# Patient Record
Sex: MALE | Race: WHITE | Employment: OTHER | URBAN - METROPOLITAN AREA
[De-identification: names, ages, dates, MRNs, and addresses within clinical notes are randomized per-mention and may not be internally consistent; named-entity substitution may affect disease eponyms.]

---

## 2019-12-02 PROBLEM — K59.00 CONSTIPATION: Status: ACTIVE | Noted: 2019-12-02

## 2019-12-02 PROBLEM — H91.93 BILATERAL HEARING LOSS: Status: ACTIVE | Noted: 2019-12-02

## 2019-12-02 PROBLEM — R01.1 MURMUR: Status: ACTIVE | Noted: 2019-12-02

## 2019-12-02 PROBLEM — N18.30 CKD (CHRONIC KIDNEY DISEASE) STAGE 3, GFR 30-59 ML/MIN (HCC): Status: ACTIVE | Noted: 2019-12-02

## 2019-12-09 ENCOUNTER — HOSPITAL ENCOUNTER (OUTPATIENT)
Dept: CT IMAGING | Age: 79
Discharge: HOME | End: 2019-12-09
Attending: FAMILY MEDICINE
Payer: MEDICARE

## 2019-12-09 DIAGNOSIS — N18.30 CKD (CHRONIC KIDNEY DISEASE) STAGE 3, GFR 30-59 ML/MIN (HCC): ICD-10-CM

## 2019-12-09 DIAGNOSIS — K59.00 CONSTIPATION, UNSPECIFIED CONSTIPATION TYPE: ICD-10-CM

## 2019-12-09 DIAGNOSIS — R10.84 GENERALIZED ABDOMINAL PAIN: ICD-10-CM

## 2019-12-09 PROCEDURE — 74176 CT ABD & PELVIS W/O CONTRAST: CPT

## 2019-12-09 PROCEDURE — 74011636320 HC RX REV CODE- 636/320: Performed by: FAMILY MEDICINE

## 2019-12-09 RX ADMIN — DIATRIZOATE MEGLUMINE AND DIATRIZOATE SODIUM 15 ML: 660; 100 LIQUID ORAL; RECTAL at 09:47

## 2019-12-26 ENCOUNTER — HOSPITAL ENCOUNTER (OUTPATIENT)
Dept: PET IMAGING | Age: 79
Discharge: HOME | End: 2019-12-26
Payer: MEDICARE

## 2019-12-26 DIAGNOSIS — R91.1 LUNG NODULE, SOLITARY: ICD-10-CM

## 2019-12-26 PROCEDURE — A9552 F18 FDG: HCPCS

## 2019-12-26 PROCEDURE — 74011636320 HC RX REV CODE- 636/320: Performed by: FAMILY MEDICINE

## 2019-12-26 RX ORDER — SODIUM CHLORIDE 0.9 % (FLUSH) 0.9 %
10 SYRINGE (ML) INJECTION
Status: COMPLETED | OUTPATIENT
Start: 2019-12-26 | End: 2019-12-26

## 2019-12-26 RX ADMIN — Medication 10 ML: at 12:43

## 2019-12-26 RX ADMIN — DIATRIZOATE MEGLUMINE AND DIATRIZOATE SODIUM 10 ML: 660; 100 LIQUID ORAL; RECTAL at 12:43

## 2020-01-23 PROBLEM — N40.1 BENIGN PROSTATIC HYPERPLASIA WITH NOCTURIA: Status: ACTIVE | Noted: 2020-01-23

## 2020-01-23 PROBLEM — R35.1 BENIGN PROSTATIC HYPERPLASIA WITH NOCTURIA: Status: ACTIVE | Noted: 2020-01-23

## 2020-01-23 PROBLEM — R97.20 ELEVATED PSA: Status: ACTIVE | Noted: 2020-01-23

## 2020-06-06 PROBLEM — I10 ESSENTIAL HYPERTENSION: Status: ACTIVE | Noted: 2020-06-06

## 2020-08-18 ENCOUNTER — HOSPITAL ENCOUNTER (OUTPATIENT)
Dept: LAB | Age: 80
Discharge: HOME | End: 2020-08-18

## 2020-08-18 PROCEDURE — 88312 SPECIAL STAINS GROUP 1: CPT

## 2020-08-18 PROCEDURE — 88305 TISSUE EXAM BY PATHOLOGIST: CPT

## 2021-01-01 ENCOUNTER — PATIENT OUTREACH (OUTPATIENT)
Dept: CASE MANAGEMENT | Age: 81
End: 2021-01-01

## 2021-01-01 ENCOUNTER — APPOINTMENT (OUTPATIENT)
Dept: NON INVASIVE DIAGNOSTICS | Age: 81
End: 2021-01-01
Attending: INTERNAL MEDICINE
Payer: MEDICARE

## 2021-01-01 ENCOUNTER — HOSPITAL ENCOUNTER (INPATIENT)
Age: 81
LOS: 1 days | Discharge: ANOTHER HOSPITAL | DRG: 057 | End: 2021-09-23
Attending: EMERGENCY MEDICINE | Admitting: FAMILY MEDICINE
Payer: MEDICARE

## 2021-01-01 ENCOUNTER — APPOINTMENT (OUTPATIENT)
Dept: MRI IMAGING | Age: 81
DRG: 057 | End: 2021-01-01
Attending: FAMILY MEDICINE
Payer: MEDICARE

## 2021-01-01 ENCOUNTER — APPOINTMENT (OUTPATIENT)
Dept: GENERAL RADIOLOGY | Age: 81
DRG: 057 | End: 2021-01-01
Attending: INTERNAL MEDICINE
Payer: MEDICARE

## 2021-01-01 ENCOUNTER — APPOINTMENT (OUTPATIENT)
Dept: CT IMAGING | Age: 81
End: 2021-01-01
Attending: INTERNAL MEDICINE
Payer: MEDICARE

## 2021-01-01 ENCOUNTER — APPOINTMENT (OUTPATIENT)
Dept: MRI IMAGING | Age: 81
End: 2021-01-01
Attending: PSYCHIATRY & NEUROLOGY
Payer: MEDICARE

## 2021-01-01 ENCOUNTER — APPOINTMENT (OUTPATIENT)
Dept: GENERAL RADIOLOGY | Age: 81
End: 2021-01-01
Attending: EMERGENCY MEDICINE
Payer: MEDICARE

## 2021-01-01 ENCOUNTER — APPOINTMENT (OUTPATIENT)
Dept: CT IMAGING | Age: 81
End: 2021-01-01
Attending: EMERGENCY MEDICINE
Payer: MEDICARE

## 2021-01-01 ENCOUNTER — APPOINTMENT (OUTPATIENT)
Dept: GENERAL RADIOLOGY | Age: 81
End: 2021-01-01
Attending: STUDENT IN AN ORGANIZED HEALTH CARE EDUCATION/TRAINING PROGRAM
Payer: MEDICARE

## 2021-01-01 ENCOUNTER — HOSPITAL ENCOUNTER (OUTPATIENT)
Age: 81
Setting detail: OBSERVATION
Discharge: HOME | End: 2021-08-14
Attending: STUDENT IN AN ORGANIZED HEALTH CARE EDUCATION/TRAINING PROGRAM | Admitting: INTERNAL MEDICINE
Payer: MEDICARE

## 2021-01-01 ENCOUNTER — HOSPITAL ENCOUNTER (EMERGENCY)
Age: 81
Discharge: HOME | End: 2021-08-01
Attending: EMERGENCY MEDICINE
Payer: MEDICARE

## 2021-01-01 ENCOUNTER — APPOINTMENT (OUTPATIENT)
Dept: MRI IMAGING | Age: 81
End: 2021-01-01
Attending: INTERNAL MEDICINE
Payer: MEDICARE

## 2021-01-01 ENCOUNTER — APPOINTMENT (OUTPATIENT)
Dept: GENERAL RADIOLOGY | Age: 81
End: 2021-01-01
Attending: INTERNAL MEDICINE
Payer: MEDICARE

## 2021-01-01 ENCOUNTER — HOSPITAL ENCOUNTER (INPATIENT)
Age: 81
LOS: 5 days | Discharge: NURSING, REHAB OR LONG-TERM CARE | DRG: 057 | End: 2021-09-28
Attending: INTERNAL MEDICINE | Admitting: FAMILY MEDICINE
Payer: MEDICARE

## 2021-01-01 VITALS
DIASTOLIC BLOOD PRESSURE: 82 MMHG | HEIGHT: 72 IN | SYSTOLIC BLOOD PRESSURE: 189 MMHG | HEART RATE: 76 BPM | TEMPERATURE: 98 F | OXYGEN SATURATION: 97 % | RESPIRATION RATE: 18 BRPM | WEIGHT: 198 LBS | BODY MASS INDEX: 26.82 KG/M2

## 2021-01-01 VITALS
TEMPERATURE: 98.2 F | RESPIRATION RATE: 18 BRPM | OXYGEN SATURATION: 97 % | DIASTOLIC BLOOD PRESSURE: 80 MMHG | SYSTOLIC BLOOD PRESSURE: 181 MMHG | HEART RATE: 71 BPM

## 2021-01-01 VITALS
WEIGHT: 209 LBS | BODY MASS INDEX: 29.26 KG/M2 | HEIGHT: 71 IN | RESPIRATION RATE: 16 BRPM | HEART RATE: 68 BPM | OXYGEN SATURATION: 98 % | DIASTOLIC BLOOD PRESSURE: 83 MMHG | TEMPERATURE: 98.2 F | SYSTOLIC BLOOD PRESSURE: 172 MMHG

## 2021-01-01 VITALS
HEART RATE: 70 BPM | TEMPERATURE: 98 F | DIASTOLIC BLOOD PRESSURE: 72 MMHG | OXYGEN SATURATION: 93 % | RESPIRATION RATE: 20 BRPM | SYSTOLIC BLOOD PRESSURE: 122 MMHG

## 2021-01-01 DIAGNOSIS — R26.2 INABILITY TO WALK: ICD-10-CM

## 2021-01-01 DIAGNOSIS — T14.8XXA ABRASION: ICD-10-CM

## 2021-01-01 DIAGNOSIS — I95.1 ORTHOSTATIC HYPOTENSION: ICD-10-CM

## 2021-01-01 DIAGNOSIS — R49.0 HOARSENESS: ICD-10-CM

## 2021-01-01 DIAGNOSIS — R27.0 ATAXIA: ICD-10-CM

## 2021-01-01 DIAGNOSIS — R29.6 MULTIPLE FALLS: ICD-10-CM

## 2021-01-01 DIAGNOSIS — R29.898 RIGHT ARM WEAKNESS: Primary | ICD-10-CM

## 2021-01-01 DIAGNOSIS — S20.211A CONTUSION OF RIB ON RIGHT SIDE, INITIAL ENCOUNTER: Primary | ICD-10-CM

## 2021-01-01 DIAGNOSIS — R47.81 SLURRED SPEECH: ICD-10-CM

## 2021-01-01 DIAGNOSIS — R53.1 WEAKNESS: Primary | ICD-10-CM

## 2021-01-01 LAB
ALBUMIN SERPL-MCNC: 3.6 G/DL (ref 3.2–4.6)
ALBUMIN/GLOB SERPL: 0.8 {RATIO} (ref 1.2–3.5)
ALP SERPL-CCNC: 71 U/L (ref 50–136)
ALT SERPL-CCNC: 33 U/L (ref 12–65)
ANION GAP SERPL CALC-SCNC: 6 MMOL/L (ref 7–16)
ANION GAP SERPL CALC-SCNC: 8 MMOL/L (ref 7–16)
ANION GAP SERPL CALC-SCNC: 8 MMOL/L (ref 7–16)
APPEARANCE UR: CLEAR
AST SERPL-CCNC: 28 U/L (ref 15–37)
ATRIAL RATE: 67 BPM
BASOPHILS # BLD: 0 K/UL (ref 0–0.2)
BASOPHILS # BLD: 0.1 K/UL (ref 0–0.2)
BASOPHILS NFR BLD: 0 % (ref 0–2)
BASOPHILS NFR BLD: 1 % (ref 0–2)
BILIRUB SERPL-MCNC: 0.4 MG/DL (ref 0.2–1.1)
BILIRUB UR QL: NEGATIVE
BUN SERPL-MCNC: 17 MG/DL (ref 8–23)
CALCIUM SERPL-MCNC: 8.9 MG/DL (ref 8.3–10.4)
CALCIUM SERPL-MCNC: 9.3 MG/DL (ref 8.3–10.4)
CALCIUM SERPL-MCNC: 9.4 MG/DL (ref 8.3–10.4)
CALCULATED P AXIS, ECG09: 53 DEGREES
CALCULATED R AXIS, ECG10: 80 DEGREES
CALCULATED T AXIS, ECG11: 65 DEGREES
CHLORIDE SERPL-SCNC: 104 MMOL/L (ref 98–107)
CHLORIDE SERPL-SCNC: 105 MMOL/L (ref 98–107)
CHLORIDE SERPL-SCNC: 107 MMOL/L (ref 98–107)
CHOLEST SERPL-MCNC: 131 MG/DL
CO2 SERPL-SCNC: 26 MMOL/L (ref 21–32)
CO2 SERPL-SCNC: 26 MMOL/L (ref 21–32)
CO2 SERPL-SCNC: 29 MMOL/L (ref 21–32)
COLOR UR: YELLOW
COVID-19 RAPID TEST, COVR: NOT DETECTED
CREAT SERPL-MCNC: 1.1 MG/DL (ref 0.8–1.5)
CREAT SERPL-MCNC: 1.2 MG/DL (ref 0.8–1.5)
CREAT SERPL-MCNC: 1.39 MG/DL (ref 0.8–1.5)
CRP SERPL-MCNC: 0.7 MG/DL (ref 0–0.9)
DIAGNOSIS, 93000: NORMAL
DIFFERENTIAL METHOD BLD: ABNORMAL
DIFFERENTIAL METHOD BLD: ABNORMAL
ECHO AO ROOT DIAM: 3.27 CM
ECHO AV AREA PEAK VELOCITY: 3.08 CM2
ECHO AV AREA PEAK VELOCITY: 3.08 CM2
ECHO AV AREA VTI: 3.09 CM2
ECHO AV AREA VTI: 3.09 CM2
ECHO AV MEAN GRADIENT: 8.44 MMHG
ECHO AV PEAK GRADIENT: 17.08 MMHG
ECHO AV PEAK VELOCITY: 206.64 CM/S
ECHO AV VTI: 41.16 CM
ECHO IVC PROX: 1.42 CM
ECHO LA MAJOR AXIS: 3.76 CM
ECHO LA MINOR AXIS: 1.77 CM
ECHO LV E' LATERAL VELOCITY: 7 CM/S
ECHO LV E' SEPTAL VELOCITY: 8 CM/S
ECHO LV EDV A2C: 48.02 ML
ECHO LV EDV A4C: 101.95 ML
ECHO LV EDV BP: 69.86 ML (ref 67–155)
ECHO LV EDV INDEX A4C: 48.1 ML/M2
ECHO LV EDV INDEX BP: 33 ML/M2
ECHO LV EDV NDEX A2C: 22.7 ML/M2
ECHO LV EJECTION FRACTION A2C: 61 PERCENT
ECHO LV EJECTION FRACTION A4C: 64 PERCENT
ECHO LV EJECTION FRACTION BIPLANE: 61.8 PERCENT (ref 55–100)
ECHO LV ESV A2C: 18.64 ML
ECHO LV ESV A4C: 36.83 ML
ECHO LV ESV BP: 26.67 ML (ref 22–58)
ECHO LV ESV INDEX A2C: 8.8 ML/M2
ECHO LV ESV INDEX A4C: 17.4 ML/M2
ECHO LV ESV INDEX BP: 12.6 ML/M2
ECHO LV INTERNAL DIMENSION DIASTOLIC: 4.93 CM (ref 4.2–5.9)
ECHO LV INTERNAL DIMENSION SYSTOLIC: 3.2 CM
ECHO LV IVSD: 0.99 CM (ref 0.6–1)
ECHO LV MASS 2D: 167.2 G (ref 88–224)
ECHO LV MASS INDEX 2D: 78.8 G/M2 (ref 49–115)
ECHO LV POSTERIOR WALL DIASTOLIC: 0.92 CM (ref 0.6–1)
ECHO LVOT DIAM: 2.39 CM
ECHO LVOT PEAK GRADIENT: 8.1 MMHG
ECHO LVOT PEAK VELOCITY: 142.34 CM/S
ECHO LVOT SV: 127.3 ML
ECHO LVOT VTI: 28.48 CM
ECHO MV A VELOCITY: 79.5 CM/S
ECHO MV AREA PHT: 2.58 CM2
ECHO MV E DECELERATION TIME (DT): 293.63 MS
ECHO MV E VELOCITY: 57.65 CM/S
ECHO MV E/A RATIO: 0.73
ECHO MV E/E' LATERAL: 8.24
ECHO MV E/E' RATIO (AVERAGED): 7.72
ECHO MV E/E' SEPTAL: 7.21
ECHO MV PRESSURE HALF TIME (PHT): 85.15 MS
ECHO RIGHT VENTRICULAR SYSTOLIC PRESSURE (RVSP): 34.8 MMHG
ECHO RV TAPSE: 2.64 CM (ref 1.5–2)
ECHO TV REGURGITANT MAX VELOCITY: 281.81 CM/S
ECHO TV REGURGITANT PEAK GRADIENT: 31.77 MMHG
EOSINOPHIL # BLD: 0 K/UL (ref 0–0.8)
EOSINOPHIL # BLD: 0 K/UL (ref 0–0.8)
EOSINOPHIL NFR BLD: 0 % (ref 0.5–7.8)
EOSINOPHIL NFR BLD: 0 % (ref 0.5–7.8)
ERYTHROCYTE [DISTWIDTH] IN BLOOD BY AUTOMATED COUNT: 11.8 % (ref 11.9–14.6)
ERYTHROCYTE [DISTWIDTH] IN BLOOD BY AUTOMATED COUNT: 11.9 % (ref 11.9–14.6)
ERYTHROCYTE [DISTWIDTH] IN BLOOD BY AUTOMATED COUNT: 12 % (ref 11.9–14.6)
ERYTHROCYTE [SEDIMENTATION RATE] IN BLOOD: 32 MM/HR (ref 0–20)
EST. AVERAGE GLUCOSE BLD GHB EST-MCNC: 114 MG/DL
GLOBULIN SER CALC-MCNC: 4.3 G/DL (ref 2.3–3.5)
GLUCOSE BLD STRIP.AUTO-MCNC: 97 MG/DL (ref 65–100)
GLUCOSE SERPL-MCNC: 101 MG/DL (ref 65–100)
GLUCOSE SERPL-MCNC: 87 MG/DL (ref 65–100)
GLUCOSE SERPL-MCNC: 95 MG/DL (ref 65–100)
GLUCOSE UR STRIP.AUTO-MCNC: NEGATIVE MG/DL
HBA1C MFR BLD: 5.6 % (ref 4.2–6.3)
HCT VFR BLD AUTO: 42 % (ref 41.1–50.3)
HCT VFR BLD AUTO: 43 % (ref 41.1–50.3)
HCT VFR BLD AUTO: 45 % (ref 41.1–50.3)
HDLC SERPL-MCNC: 32 MG/DL (ref 40–60)
HDLC SERPL: 4.1 {RATIO}
HGB BLD-MCNC: 14.2 G/DL (ref 13.6–17.2)
HGB BLD-MCNC: 14.3 G/DL (ref 13.6–17.2)
HGB BLD-MCNC: 15 G/DL (ref 13.6–17.2)
HGB UR QL STRIP: NEGATIVE
IMM GRANULOCYTES # BLD AUTO: 0 K/UL (ref 0–0.5)
IMM GRANULOCYTES # BLD AUTO: 0 K/UL (ref 0–0.5)
IMM GRANULOCYTES NFR BLD AUTO: 0 % (ref 0–5)
IMM GRANULOCYTES NFR BLD AUTO: 0 % (ref 0–5)
INR PPP: 1
KETONES UR QL STRIP.AUTO: NEGATIVE MG/DL
LDLC SERPL CALC-MCNC: 81.4 MG/DL
LEUKOCYTE ESTERASE UR QL STRIP.AUTO: NEGATIVE
LVOT MG: 3.91 MMHG
LYMPHOCYTES # BLD: 1.7 K/UL (ref 0.5–4.6)
LYMPHOCYTES # BLD: 1.7 K/UL (ref 0.5–4.6)
LYMPHOCYTES NFR BLD: 17 % (ref 13–44)
LYMPHOCYTES NFR BLD: 19 % (ref 13–44)
MAGNESIUM SERPL-MCNC: 2.2 MG/DL (ref 1.8–2.4)
MCH RBC QN AUTO: 30.6 PG (ref 26.1–32.9)
MCH RBC QN AUTO: 30.7 PG (ref 26.1–32.9)
MCH RBC QN AUTO: 31.3 PG (ref 26.1–32.9)
MCHC RBC AUTO-ENTMCNC: 33.3 G/DL (ref 31.4–35)
MCHC RBC AUTO-ENTMCNC: 33.3 G/DL (ref 31.4–35)
MCHC RBC AUTO-ENTMCNC: 33.8 G/DL (ref 31.4–35)
MCV RBC AUTO: 90.7 FL (ref 79.6–97.8)
MCV RBC AUTO: 92.1 FL (ref 79.6–97.8)
MCV RBC AUTO: 93.8 FL (ref 79.6–97.8)
MM INDURATION POC: 0 MM (ref 0–5)
MM INDURATION POC: NORMAL (ref 0–5)
MONOCYTES # BLD: 0.8 K/UL (ref 0.1–1.3)
MONOCYTES # BLD: 0.9 K/UL (ref 0.1–1.3)
MONOCYTES NFR BLD: 10 % (ref 4–12)
MONOCYTES NFR BLD: 8 % (ref 4–12)
NEUTS SEG # BLD: 6.1 K/UL (ref 1.7–8.2)
NEUTS SEG # BLD: 7.3 K/UL (ref 1.7–8.2)
NEUTS SEG NFR BLD: 69 % (ref 43–78)
NEUTS SEG NFR BLD: 74 % (ref 43–78)
NITRITE UR QL STRIP.AUTO: NEGATIVE
NRBC # BLD: 0 K/UL (ref 0–0.2)
P-R INTERVAL, ECG05: 174 MS
PH UR STRIP: 5.5 [PH] (ref 5–9)
PLATELET # BLD AUTO: 248 K/UL (ref 150–450)
PLATELET # BLD AUTO: 271 K/UL (ref 150–450)
PLATELET # BLD AUTO: 349 K/UL (ref 150–450)
PMV BLD AUTO: 8.9 FL (ref 9.4–12.3)
PMV BLD AUTO: 9.3 FL (ref 9.4–12.3)
PMV BLD AUTO: 9.6 FL (ref 9.4–12.3)
POTASSIUM SERPL-SCNC: 3.6 MMOL/L (ref 3.5–5.1)
POTASSIUM SERPL-SCNC: 4.1 MMOL/L (ref 3.5–5.1)
POTASSIUM SERPL-SCNC: 4.2 MMOL/L (ref 3.5–5.1)
PPD POC: NEGATIVE NEGATIVE
PROT SERPL-MCNC: 7.9 G/DL (ref 6.3–8.2)
PROT UR STRIP-MCNC: NEGATIVE MG/DL
PROTHROMBIN TIME: 13.5 SEC (ref 12.6–14.5)
Q-T INTERVAL, ECG07: 388 MS
QRS DURATION, ECG06: 74 MS
QTC CALCULATION (BEZET), ECG08: 409 MS
RBC # BLD AUTO: 4.63 M/UL (ref 4.23–5.6)
RBC # BLD AUTO: 4.67 M/UL (ref 4.23–5.6)
RBC # BLD AUTO: 4.8 M/UL (ref 4.23–5.6)
SARS-COV-2, COV2: NORMAL
SARS-COV-2, COV2: NOT DETECTED
SERVICE CMNT-IMP: NORMAL
SODIUM SERPL-SCNC: 138 MMOL/L (ref 136–145)
SODIUM SERPL-SCNC: 140 MMOL/L (ref 136–145)
SODIUM SERPL-SCNC: 141 MMOL/L (ref 138–145)
SOURCE, COVRS: NORMAL
SP GR UR REFRACTOMETRY: 1.01 (ref 1–1.02)
SPECIMEN SOURCE, FCOV2M: NORMAL
TRIGL SERPL-MCNC: 88 MG/DL (ref 35–150)
TROPONIN-HIGH SENSITIVITY: 12 PG/ML (ref 0–14)
TSH SERPL DL<=0.005 MIU/L-ACNC: 1.8 UIU/ML (ref 0.36–3.74)
UROBILINOGEN UR QL STRIP.AUTO: 1 EU/DL (ref 0.2–1)
VENTRICULAR RATE, ECG03: 67 BPM
VIT B12 SERPL-MCNC: 622 PG/ML (ref 193–986)
VLDLC SERPL CALC-MCNC: 17.6 MG/DL (ref 6–23)
WBC # BLD AUTO: 8.8 K/UL (ref 4.3–11.1)
WBC # BLD AUTO: 8.9 K/UL (ref 4.3–11.1)
WBC # BLD AUTO: 9.9 K/UL (ref 4.3–11.1)

## 2021-01-01 PROCEDURE — 74011250637 HC RX REV CODE- 250/637: Performed by: INTERNAL MEDICINE

## 2021-01-01 PROCEDURE — 97116 GAIT TRAINING THERAPY: CPT

## 2021-01-01 PROCEDURE — 99218 HC RM OBSERVATION: CPT

## 2021-01-01 PROCEDURE — 74011250637 HC RX REV CODE- 250/637: Performed by: FAMILY MEDICINE

## 2021-01-01 PROCEDURE — 93005 ELECTROCARDIOGRAM TRACING: CPT | Performed by: STUDENT IN AN ORGANIZED HEALTH CARE EDUCATION/TRAINING PROGRAM

## 2021-01-01 PROCEDURE — 92611 MOTION FLUOROSCOPY/SWALLOW: CPT

## 2021-01-01 PROCEDURE — 80048 BASIC METABOLIC PNL TOTAL CA: CPT

## 2021-01-01 PROCEDURE — 2709999900 HC NON-CHARGEABLE SUPPLY

## 2021-01-01 PROCEDURE — 77030040361 HC SLV COMPR DVT MDII -B

## 2021-01-01 PROCEDURE — 65270000029 HC RM PRIVATE

## 2021-01-01 PROCEDURE — 71100 X-RAY EXAM RIBS UNI 2 VIEWS: CPT

## 2021-01-01 PROCEDURE — 72100 X-RAY EXAM L-S SPINE 2/3 VWS: CPT

## 2021-01-01 PROCEDURE — 83735 ASSAY OF MAGNESIUM: CPT

## 2021-01-01 PROCEDURE — 74011000250 HC RX REV CODE- 250: Performed by: INTERNAL MEDICINE

## 2021-01-01 PROCEDURE — 97110 THERAPEUTIC EXERCISES: CPT

## 2021-01-01 PROCEDURE — 97530 THERAPEUTIC ACTIVITIES: CPT

## 2021-01-01 PROCEDURE — 90471 IMMUNIZATION ADMIN: CPT

## 2021-01-01 PROCEDURE — 36415 COLL VENOUS BLD VENIPUNCTURE: CPT

## 2021-01-01 PROCEDURE — 93005 ELECTROCARDIOGRAM TRACING: CPT | Performed by: EMERGENCY MEDICINE

## 2021-01-01 PROCEDURE — 85027 COMPLETE CBC AUTOMATED: CPT

## 2021-01-01 PROCEDURE — 97166 OT EVAL MOD COMPLEX 45 MIN: CPT

## 2021-01-01 PROCEDURE — 84443 ASSAY THYROID STIM HORMONE: CPT

## 2021-01-01 PROCEDURE — 80053 COMPREHEN METABOLIC PANEL: CPT

## 2021-01-01 PROCEDURE — 74011250636 HC RX REV CODE- 250/636: Performed by: PHYSICIAN ASSISTANT

## 2021-01-01 PROCEDURE — 82607 VITAMIN B-12: CPT

## 2021-01-01 PROCEDURE — 72148 MRI LUMBAR SPINE W/O DYE: CPT

## 2021-01-01 PROCEDURE — 65390000012 HC CONDITION CODE 44 OBSERVATION

## 2021-01-01 PROCEDURE — 99232 SBSQ HOSP IP/OBS MODERATE 35: CPT | Performed by: NURSE PRACTITIONER

## 2021-01-01 PROCEDURE — 74011000636 HC RX REV CODE- 636: Performed by: INTERNAL MEDICINE

## 2021-01-01 PROCEDURE — 74011000258 HC RX REV CODE- 258: Performed by: INTERNAL MEDICINE

## 2021-01-01 PROCEDURE — 84484 ASSAY OF TROPONIN QUANT: CPT

## 2021-01-01 PROCEDURE — 70450 CT HEAD/BRAIN W/O DYE: CPT

## 2021-01-01 PROCEDURE — 72146 MRI CHEST SPINE W/O DYE: CPT

## 2021-01-01 PROCEDURE — 74011250636 HC RX REV CODE- 250/636: Performed by: INTERNAL MEDICINE

## 2021-01-01 PROCEDURE — 92526 ORAL FUNCTION THERAPY: CPT

## 2021-01-01 PROCEDURE — 85025 COMPLETE CBC W/AUTO DIFF WBC: CPT

## 2021-01-01 PROCEDURE — 97165 OT EVAL LOW COMPLEX 30 MIN: CPT

## 2021-01-01 PROCEDURE — 70498 CT ANGIOGRAPHY NECK: CPT

## 2021-01-01 PROCEDURE — 81003 URINALYSIS AUTO W/O SCOPE: CPT

## 2021-01-01 PROCEDURE — 83036 HEMOGLOBIN GLYCOSYLATED A1C: CPT

## 2021-01-01 PROCEDURE — 99285 EMERGENCY DEPT VISIT HI MDM: CPT

## 2021-01-01 PROCEDURE — 99231 SBSQ HOSP IP/OBS SF/LOW 25: CPT | Performed by: PSYCHIATRY & NEUROLOGY

## 2021-01-01 PROCEDURE — 90715 TDAP VACCINE 7 YRS/> IM: CPT | Performed by: PHYSICIAN ASSISTANT

## 2021-01-01 PROCEDURE — 86140 C-REACTIVE PROTEIN: CPT

## 2021-01-01 PROCEDURE — 74011250637 HC RX REV CODE- 250/637: Performed by: PHYSICIAN ASSISTANT

## 2021-01-01 PROCEDURE — 72141 MRI NECK SPINE W/O DYE: CPT

## 2021-01-01 PROCEDURE — 74011250636 HC RX REV CODE- 250/636: Performed by: EMERGENCY MEDICINE

## 2021-01-01 PROCEDURE — 97535 SELF CARE MNGMENT TRAINING: CPT

## 2021-01-01 PROCEDURE — 74230 X-RAY XM SWLNG FUNCJ C+: CPT

## 2021-01-01 PROCEDURE — 86580 TB INTRADERMAL TEST: CPT | Performed by: INTERNAL MEDICINE

## 2021-01-01 PROCEDURE — 99284 EMERGENCY DEPT VISIT MOD MDM: CPT

## 2021-01-01 PROCEDURE — 97162 PT EVAL MOD COMPLEX 30 MIN: CPT

## 2021-01-01 PROCEDURE — 97161 PT EVAL LOW COMPLEX 20 MIN: CPT

## 2021-01-01 PROCEDURE — 87635 SARS-COV-2 COVID-19 AMP PRB: CPT

## 2021-01-01 PROCEDURE — 71046 X-RAY EXAM CHEST 2 VIEWS: CPT

## 2021-01-01 PROCEDURE — 82962 GLUCOSE BLOOD TEST: CPT

## 2021-01-01 PROCEDURE — 93306 TTE W/DOPPLER COMPLETE: CPT

## 2021-01-01 PROCEDURE — 70551 MRI BRAIN STEM W/O DYE: CPT

## 2021-01-01 PROCEDURE — 85610 PROTHROMBIN TIME: CPT

## 2021-01-01 PROCEDURE — U0005 INFEC AGEN DETEC AMPLI PROBE: HCPCS

## 2021-01-01 PROCEDURE — 74011000302 HC RX REV CODE- 302: Performed by: INTERNAL MEDICINE

## 2021-01-01 PROCEDURE — 73130 X-RAY EXAM OF HAND: CPT

## 2021-01-01 PROCEDURE — 96372 THER/PROPH/DIAG INJ SC/IM: CPT

## 2021-01-01 PROCEDURE — 97112 NEUROMUSCULAR REEDUCATION: CPT

## 2021-01-01 PROCEDURE — 80061 LIPID PANEL: CPT

## 2021-01-01 PROCEDURE — 99222 1ST HOSP IP/OBS MODERATE 55: CPT | Performed by: PSYCHIATRY & NEUROLOGY

## 2021-01-01 PROCEDURE — 92610 EVALUATE SWALLOWING FUNCTION: CPT

## 2021-01-01 PROCEDURE — 85652 RBC SED RATE AUTOMATED: CPT

## 2021-01-01 PROCEDURE — APPSS60 APP SPLIT SHARED TIME 46-60 MINUTES: Performed by: NURSE PRACTITIONER

## 2021-01-01 PROCEDURE — 65660000000 HC RM CCU STEPDOWN

## 2021-01-01 PROCEDURE — 99283 EMERGENCY DEPT VISIT LOW MDM: CPT

## 2021-01-01 RX ORDER — LABETALOL HYDROCHLORIDE 5 MG/ML
5 INJECTION, SOLUTION INTRAVENOUS
Status: CANCELLED | OUTPATIENT
Start: 2021-01-01

## 2021-01-01 RX ORDER — ACETAMINOPHEN 325 MG/1
650 TABLET ORAL
Status: DISCONTINUED | OUTPATIENT
Start: 2021-01-01 | End: 2021-01-01 | Stop reason: HOSPADM

## 2021-01-01 RX ORDER — ACETAMINOPHEN 650 MG/1
650 SUPPOSITORY RECTAL
Status: DISCONTINUED | OUTPATIENT
Start: 2021-01-01 | End: 2021-01-01 | Stop reason: HOSPADM

## 2021-01-01 RX ORDER — GUAIFENESIN 100 MG/5ML
81 LIQUID (ML) ORAL DAILY
Status: CANCELLED | OUTPATIENT
Start: 2021-01-01

## 2021-01-01 RX ORDER — POLYETHYLENE GLYCOL 3350 17 G/17G
17 POWDER, FOR SOLUTION ORAL DAILY PRN
Status: DISCONTINUED | OUTPATIENT
Start: 2021-01-01 | End: 2021-01-01 | Stop reason: HOSPADM

## 2021-01-01 RX ORDER — SODIUM CHLORIDE 0.9 % (FLUSH) 0.9 %
5-10 SYRINGE (ML) INJECTION EVERY 8 HOURS
Status: DISCONTINUED | OUTPATIENT
Start: 2021-01-01 | End: 2021-01-01 | Stop reason: HOSPADM

## 2021-01-01 RX ORDER — SODIUM CHLORIDE 0.9 % (FLUSH) 0.9 %
5-10 SYRINGE (ML) INJECTION AS NEEDED
Status: DISCONTINUED | OUTPATIENT
Start: 2021-01-01 | End: 2021-01-01 | Stop reason: HOSPADM

## 2021-01-01 RX ORDER — OXYCODONE HYDROCHLORIDE 5 MG/1
5 TABLET ORAL
Status: DISCONTINUED | OUTPATIENT
Start: 2021-01-01 | End: 2021-01-01 | Stop reason: HOSPADM

## 2021-01-01 RX ORDER — ACETAMINOPHEN 650 MG/1
650 SUPPOSITORY RECTAL
Status: CANCELLED | OUTPATIENT
Start: 2021-01-01

## 2021-01-01 RX ORDER — TAMSULOSIN HYDROCHLORIDE 0.4 MG/1
0.4 CAPSULE ORAL DAILY
Status: DISCONTINUED | OUTPATIENT
Start: 2021-01-01 | End: 2021-01-01 | Stop reason: HOSPADM

## 2021-01-01 RX ORDER — ONDANSETRON 4 MG/1
4 TABLET, ORALLY DISINTEGRATING ORAL
Status: DISCONTINUED | OUTPATIENT
Start: 2021-01-01 | End: 2021-01-01 | Stop reason: HOSPADM

## 2021-01-01 RX ORDER — SODIUM CHLORIDE 0.9 % (FLUSH) 0.9 %
10 SYRINGE (ML) INJECTION
Status: ACTIVE | OUTPATIENT
Start: 2021-01-01 | End: 2021-01-01

## 2021-01-01 RX ORDER — ONDANSETRON 2 MG/ML
4 INJECTION INTRAMUSCULAR; INTRAVENOUS
Status: DISCONTINUED | OUTPATIENT
Start: 2021-01-01 | End: 2021-01-01 | Stop reason: HOSPADM

## 2021-01-01 RX ORDER — ACETAMINOPHEN 325 MG/1
650 TABLET ORAL
Status: CANCELLED | OUTPATIENT
Start: 2021-01-01

## 2021-01-01 RX ORDER — FACIAL-BODY WIPES
10 EACH TOPICAL DAILY PRN
Status: DISCONTINUED | OUTPATIENT
Start: 2021-01-01 | End: 2021-01-01 | Stop reason: HOSPADM

## 2021-01-01 RX ORDER — SODIUM CHLORIDE 0.9 % (FLUSH) 0.9 %
5-40 SYRINGE (ML) INJECTION EVERY 8 HOURS
Status: DISCONTINUED | OUTPATIENT
Start: 2021-01-01 | End: 2021-01-01 | Stop reason: HOSPADM

## 2021-01-01 RX ORDER — CETIRIZINE HCL 10 MG
10 TABLET ORAL DAILY
Qty: 1 TABLET | Refills: 0 | Status: SHIPPED
Start: 2021-01-01

## 2021-01-01 RX ORDER — SODIUM CHLORIDE 0.9 % (FLUSH) 0.9 %
5-40 SYRINGE (ML) INJECTION AS NEEDED
Status: DISCONTINUED | OUTPATIENT
Start: 2021-01-01 | End: 2021-01-01 | Stop reason: HOSPADM

## 2021-01-01 RX ORDER — SODIUM CHLORIDE 9 MG/ML
75 INJECTION, SOLUTION INTRAVENOUS CONTINUOUS
Status: DISCONTINUED | OUTPATIENT
Start: 2021-01-01 | End: 2021-01-01

## 2021-01-01 RX ORDER — GUAIFENESIN 100 MG/5ML
81 LIQUID (ML) ORAL DAILY
Status: DISCONTINUED | OUTPATIENT
Start: 2021-01-01 | End: 2021-01-01 | Stop reason: HOSPADM

## 2021-01-01 RX ORDER — ONDANSETRON 4 MG/1
4 TABLET, ORALLY DISINTEGRATING ORAL
Status: CANCELLED | OUTPATIENT
Start: 2021-01-01

## 2021-01-01 RX ORDER — HYDRALAZINE HYDROCHLORIDE 50 MG/1
25 TABLET, FILM COATED ORAL
Status: DISCONTINUED | OUTPATIENT
Start: 2021-01-01 | End: 2021-01-01 | Stop reason: HOSPADM

## 2021-01-01 RX ORDER — ENOXAPARIN SODIUM 100 MG/ML
40 INJECTION SUBCUTANEOUS EVERY 24 HOURS
Status: DISCONTINUED | OUTPATIENT
Start: 2021-01-01 | End: 2021-01-01 | Stop reason: HOSPADM

## 2021-01-01 RX ORDER — FAMOTIDINE 20 MG/1
20 TABLET, FILM COATED ORAL
Status: DISCONTINUED | OUTPATIENT
Start: 2021-01-01 | End: 2021-01-01 | Stop reason: HOSPADM

## 2021-01-01 RX ORDER — ONDANSETRON 2 MG/ML
4 INJECTION INTRAMUSCULAR; INTRAVENOUS
Status: CANCELLED | OUTPATIENT
Start: 2021-01-01

## 2021-01-01 RX ORDER — POLYETHYLENE GLYCOL 3350 17 G/17G
17 POWDER, FOR SOLUTION ORAL DAILY PRN
Status: CANCELLED | OUTPATIENT
Start: 2021-01-01

## 2021-01-01 RX ORDER — ATORVASTATIN CALCIUM 40 MG/1
40 TABLET, FILM COATED ORAL
Status: DISCONTINUED | OUTPATIENT
Start: 2021-01-01 | End: 2021-01-01

## 2021-01-01 RX ORDER — CETIRIZINE HCL 10 MG
10 TABLET ORAL DAILY
Status: DISCONTINUED | OUTPATIENT
Start: 2021-01-01 | End: 2021-01-01 | Stop reason: HOSPADM

## 2021-01-01 RX ORDER — GUAIFENESIN 100 MG/5ML
81 LIQUID (ML) ORAL DAILY
Qty: 30 TABLET | Refills: 2 | Status: SHIPPED | OUTPATIENT
Start: 2021-01-01

## 2021-01-01 RX ORDER — SODIUM CHLORIDE 0.9 % (FLUSH) 0.9 %
10 SYRINGE (ML) INJECTION
Status: COMPLETED | OUTPATIENT
Start: 2021-01-01 | End: 2021-01-01

## 2021-01-01 RX ORDER — FINASTERIDE 5 MG/1
5 TABLET, FILM COATED ORAL DAILY
Status: DISCONTINUED | OUTPATIENT
Start: 2021-01-01 | End: 2021-01-01 | Stop reason: HOSPADM

## 2021-01-01 RX ORDER — ACETAMINOPHEN 325 MG/1
650 TABLET ORAL
Status: COMPLETED | OUTPATIENT
Start: 2021-01-01 | End: 2021-01-01

## 2021-01-01 RX ADMIN — ATORVASTATIN CALCIUM 40 MG: 40 TABLET, FILM COATED ORAL at 21:43

## 2021-01-01 RX ADMIN — Medication 10 ML: at 13:09

## 2021-01-01 RX ADMIN — Medication 10 ML: at 21:13

## 2021-01-01 RX ADMIN — Medication 10 ML: at 14:14

## 2021-01-01 RX ADMIN — Medication 5 ML: at 04:41

## 2021-01-01 RX ADMIN — ATORVASTATIN CALCIUM 40 MG: 40 TABLET, FILM COATED ORAL at 21:13

## 2021-01-01 RX ADMIN — ASPIRIN 81 MG: 81 TABLET, CHEWABLE ORAL at 08:46

## 2021-01-01 RX ADMIN — BARIUM SULFATE 45 ML: 400 SUSPENSION ORAL at 10:43

## 2021-01-01 RX ADMIN — Medication 5 ML: at 21:44

## 2021-01-01 RX ADMIN — SODIUM CHLORIDE 100 ML: 900 INJECTION, SOLUTION INTRAVENOUS at 08:06

## 2021-01-01 RX ADMIN — FINASTERIDE 5 MG: 5 TABLET, FILM COATED ORAL at 08:44

## 2021-01-01 RX ADMIN — ASPIRIN 81 MG: 81 TABLET, CHEWABLE ORAL at 08:52

## 2021-01-01 RX ADMIN — Medication 5 ML: at 04:24

## 2021-01-01 RX ADMIN — ACETAMINOPHEN 650 MG: 325 TABLET ORAL at 14:20

## 2021-01-01 RX ADMIN — IOPAMIDOL 50 ML: 755 INJECTION, SOLUTION INTRAVENOUS at 08:05

## 2021-01-01 RX ADMIN — SODIUM CHLORIDE 75 ML/HR: 900 INJECTION, SOLUTION INTRAVENOUS at 21:46

## 2021-01-01 RX ADMIN — POLYETHYLENE GLYCOL 3350 17 G: 17 POWDER, FOR SOLUTION ORAL at 09:06

## 2021-01-01 RX ADMIN — CETIRIZINE HYDROCHLORIDE 10 MG: 10 TABLET ORAL at 18:15

## 2021-01-01 RX ADMIN — TAMSULOSIN HYDROCHLORIDE 0.4 MG: 0.4 CAPSULE ORAL at 08:44

## 2021-01-01 RX ADMIN — HYDRALAZINE HYDROCHLORIDE 25 MG: 25 TABLET ORAL at 01:23

## 2021-01-01 RX ADMIN — ASPIRIN 81 MG: 81 TABLET, CHEWABLE ORAL at 08:44

## 2021-01-01 RX ADMIN — Medication 5 ML: at 22:48

## 2021-01-01 RX ADMIN — OXYCODONE 5 MG: 5 TABLET ORAL at 08:45

## 2021-01-01 RX ADMIN — ASPIRIN 81 MG: 81 TABLET, CHEWABLE ORAL at 09:19

## 2021-01-01 RX ADMIN — BARIUM SULFATE 45 ML: 980 POWDER, FOR SUSPENSION ORAL at 10:43

## 2021-01-01 RX ADMIN — CETIRIZINE HYDROCHLORIDE 10 MG: 10 TABLET ORAL at 08:50

## 2021-01-01 RX ADMIN — ASPIRIN 81 MG: 81 TABLET, CHEWABLE ORAL at 08:50

## 2021-01-01 RX ADMIN — BARIUM SULFATE 45 ML: 980 POWDER, FOR SUSPENSION ORAL at 13:11

## 2021-01-01 RX ADMIN — SODIUM CHLORIDE 1000 ML: 900 INJECTION, SOLUTION INTRAVENOUS at 15:35

## 2021-01-01 RX ADMIN — TETANUS TOXOID, REDUCED DIPHTHERIA TOXOID AND ACELLULAR PERTUSSIS VACCINE, ADSORBED 0.5 ML: 5; 2.5; 8; 8; 2.5 SUSPENSION INTRAMUSCULAR at 09:30

## 2021-01-01 RX ADMIN — ACETAMINOPHEN 650 MG: 325 TABLET, FILM COATED ORAL at 18:30

## 2021-01-01 RX ADMIN — ENOXAPARIN SODIUM 40 MG: 40 INJECTION SUBCUTANEOUS at 21:43

## 2021-01-01 RX ADMIN — OXYCODONE 5 MG: 5 TABLET ORAL at 15:27

## 2021-01-01 RX ADMIN — HYDRALAZINE HYDROCHLORIDE 25 MG: 25 TABLET ORAL at 01:06

## 2021-01-01 RX ADMIN — Medication 5 ML: at 08:46

## 2021-01-01 RX ADMIN — BARIUM SULFATE 15 ML: 400 PASTE ORAL at 13:10

## 2021-01-01 RX ADMIN — HYDRALAZINE HYDROCHLORIDE 25 MG: 25 TABLET ORAL at 08:45

## 2021-01-01 RX ADMIN — BARIUM SULFATE 15 ML: 400 SUSPENSION ORAL at 13:15

## 2021-01-01 RX ADMIN — Medication 5 ML: at 04:44

## 2021-01-01 RX ADMIN — HYDRALAZINE HYDROCHLORIDE 25 MG: 25 TABLET ORAL at 06:42

## 2021-01-01 RX ADMIN — HYDRALAZINE HYDROCHLORIDE 25 MG: 25 TABLET ORAL at 19:32

## 2021-01-01 RX ADMIN — BARIUM SULFATE 15 ML: 400 SUSPENSION ORAL at 13:18

## 2021-01-01 RX ADMIN — Medication 10 ML: at 14:15

## 2021-01-01 RX ADMIN — ASPIRIN 81 MG: 81 TABLET, CHEWABLE ORAL at 08:56

## 2021-01-01 RX ADMIN — TAMSULOSIN HYDROCHLORIDE 0.4 MG: 0.4 CAPSULE ORAL at 08:39

## 2021-01-01 RX ADMIN — BARIUM SULFATE 15 ML: 400 PASTE ORAL at 10:43

## 2021-01-01 RX ADMIN — CETIRIZINE HYDROCHLORIDE 10 MG: 10 TABLET ORAL at 08:46

## 2021-01-01 RX ADMIN — HYDRALAZINE HYDROCHLORIDE 25 MG: 25 TABLET ORAL at 05:11

## 2021-01-01 RX ADMIN — CETIRIZINE HYDROCHLORIDE 10 MG: 10 TABLET ORAL at 08:52

## 2021-01-01 RX ADMIN — FINASTERIDE 5 MG: 5 TABLET, FILM COATED ORAL at 08:39

## 2021-01-01 RX ADMIN — TUBERCULIN PURIFIED PROTEIN DERIVATIVE 5 UNITS: 5 INJECTION, SOLUTION INTRADERMAL at 21:43

## 2021-01-01 RX ADMIN — ACETAMINOPHEN 650 MG: 325 TABLET ORAL at 09:24

## 2021-01-01 RX ADMIN — ASPIRIN 81 MG: 81 TABLET, CHEWABLE ORAL at 08:39

## 2021-01-01 RX ADMIN — ASPIRIN 81 MG: 81 TABLET, CHEWABLE ORAL at 08:38

## 2021-01-01 RX ADMIN — Medication 5 ML: at 21:49

## 2021-01-01 RX ADMIN — Medication 10 ML: at 08:06

## 2021-08-01 NOTE — ED TRIAGE NOTES
Pt to triage via w/c. Pt states was walking in the house and bedroom slipper caused him to trip and fall on the stairs leading into the house. Denies dizziness prior to fall, hitting head or LOC. Pt has right rib pain and right elbow. 2 skin tears. Pt also has right lower back pain as well.  Denies anticoags

## 2021-08-01 NOTE — ED PROVIDER NOTES
Patient to ER complaining of right rib pain status post fall this morning. Apparently he was in his slippers on his front porch slipped on the step and fell onto his right rib area he was ambulatory after the fall. He is not on any blood thinners or aspirin. Denies any head injury, no chest pain or shortness of breath no meds taken prior to arrival    The history is provided by the patient. Fall  The accident occurred 1 to 2 hours ago. The fall occurred while walking. He fell from a height of ground level. He landed on concrete. The volume of blood lost was minimal. Point of impact: rt Side. Pain location: Right ribs. The pain is at a severity of 10/10. He was ambulatory at the scene. There was no entrapment after the fall. There was no drug use involved in the accident. There was no alcohol use involved in the accident. Pertinent negatives include no loss of consciousness. The risk factors include being elderly. Associated symptoms comments: Abrasion  to right elbow. The symptoms are aggravated by pressure on injury. He has tried nothing for the symptoms. The treatment provided no relief. It is unknown when the patient last had a tetanus shot.         Past Medical History:   Diagnosis Date    Chronic kidney disease     Elevated PSA     Hearing difficulty     Hepatitis A 2020    Hypercholesterolemia     Kidney stone        Past Surgical History:   Procedure Laterality Date    HX COLONOSCOPY  05/18/2016         Family History:   Problem Relation Age of Onset    No Known Problems Mother     Cancer Father     No Known Problems Maternal Grandmother     No Known Problems Maternal Grandfather     No Known Problems Paternal Grandmother     No Known Problems Paternal Grandfather        Social History     Socioeconomic History    Marital status:      Spouse name: Not on file    Number of children: Not on file    Years of education: Not on file    Highest education level: Not on file Occupational History    Not on file   Tobacco Use    Smoking status: Never Smoker    Smokeless tobacco: Never Used   Vaping Use    Vaping Use: Never used   Substance and Sexual Activity    Alcohol use: No     Alcohol/week: 0.0 standard drinks    Drug use: Never    Sexual activity: Never   Other Topics Concern    Not on file   Social History Narrative    Not on file     Social Determinants of Health     Financial Resource Strain:     Difficulty of Paying Living Expenses:    Food Insecurity:     Worried About Running Out of Food in the Last Year:     [address] in the Last Year:    Transportation Needs:     Lack of Transportation (Medical):  Lack of Transportation (Non-Medical):    Physical Activity:     Days of Exercise per Week:     Minutes of Exercise per Session:    Stress:     Feeling of Stress :    Social Connections:     Frequency of Communication with Friends and Family:     Frequency of Social Gatherings with Friends and Family:     Attends Mandaeism Services:     Active Member of Clubs or Organizations:     Attends Club or Organization Meetings:     Marital Status:    Intimate Partner Violence:     Fear of Current or Ex-Partner:     Emotionally Abused:     Physically Abused:     Sexually Abused: ALLERGIES: Augmentin [amoxicillin-pot clavulanate]    Review of Systems   Neurological: Negative for loss of consciousness. All other systems reviewed and are negative. Vitals:    08/01/21 0822   BP: (!) 181/87   Pulse: 63   Resp: 16   Temp: 98.2 °F (36.8 °C)   SpO2: 96%   Weight: 94.8 kg (209 lb)   Height: 5' 11\" (1.803 m)            Physical Exam  Vitals and nursing note reviewed. Constitutional:       General: He is not in acute distress. Appearance: Normal appearance. He is well-developed and normal weight. He is not diaphoretic. HENT:      Head: Normocephalic and atraumatic.       Right Ear: External ear normal.      Left Ear: External ear normal. Nose: Nose normal.      Mouth/Throat:      Mouth: Mucous membranes are moist.      Pharynx: Oropharynx is clear. Eyes:      Pupils: Pupils are equal, round, and reactive to light. Neck:      Comments: Full cervical range of motion no tenderness to palpation  Cardiovascular:      Rate and Rhythm: Normal rate and regular rhythm. Pulmonary:      Effort: Pulmonary effort is normal.      Breath sounds: Normal breath sounds. Comments: No pain over anterior chest area some right lower rib tenderness noted lungs are clear no crepitus  Abdominal:      General: Abdomen is flat. Bowel sounds are normal.      Palpations: Abdomen is soft. Tenderness: There is no abdominal tenderness. Hernia: No hernia is present. Musculoskeletal:         General: Tenderness present. Normal range of motion. Cervical back: Normal range of motion and neck supple. No tenderness. Comments: Pain to palpation right lower lateral rib area no abrasions no point tenderness, no pain to palpation over thoracic or obvious lumbar spine area no pain to posterior hip area. Full range of motion of bilateral hips and knees, right upper forearm/elbow area with approximately 2 cm superficial abrasion no active bleeding. No bony tenderness to motion of the elbow. No shoulder or wrist pain noted   Skin:     General: Skin is warm. Neurological:      General: No focal deficit present. Mental Status: He is alert and oriented to person, place, and time. Mental status is at baseline. Psychiatric:         Mood and Affect: Mood normal.         Behavior: Behavior normal.          MDM  Number of Diagnoses or Management Options  Diagnosis management comments: XR RIBS RT UNI 2 V   Final Result    1. No acute abnormality. XR SPINE LUMB 2 OR 3 V   Final Result        1. No acute abnormality        2. Multilevel degenerative changes        3.  Atherosclerosis      Abrasion to right elbow cleaned and dressed tetanus was updated patient given Tylenol for pain.   Patient use ice to all sore areas alternate Tylenol Motrin for pain see primary care doctor for recheck next week return to ER symptoms worsen       Amount and/or Complexity of Data Reviewed  Tests in the radiology section of CPT®: ordered and reviewed  Review and summarize past medical records: yes    Risk of Complications, Morbidity, and/or Mortality  Presenting problems: moderate  Diagnostic procedures: moderate  Management options: low    Patient Progress  Patient progress: improved         Procedures

## 2021-08-01 NOTE — DISCHARGE INSTRUCTIONS
Ice to all sore areas, alternate Tylenol Motrin for pain, keep abrasion clean, see your primary care physician for routine recheck or return if any chest pain shortness of breath or fever

## 2021-08-01 NOTE — ED NOTES
I have reviewed discharge instructions with the patient and spouse. The patient and spouse verbalized understanding. Patient left ED via Discharge Method: ambulatory to Home with spouse. Opportunity for questions and clarification provided. Patient given 0 scripts. No e-sign. To continue your aftercare when you leave the hospital, you may receive an automated call from our care team to check in on how you are doing. This is a free service and part of our promise to provide the best care and service to meet your aftercare needs.  If you have questions, or wish to unsubscribe from this service please call [phone]. Thank you for Choosing our [facility] Emergency Department.

## 2021-08-11 PROBLEM — I16.1 HYPERTENSIVE EMERGENCY: Status: ACTIVE | Noted: 2021-01-01

## 2021-08-11 PROBLEM — I63.9 ACUTE ISCHEMIC STROKE (HCC): Status: ACTIVE | Noted: 2021-01-01

## 2021-08-11 NOTE — ED NOTES
Pt had a fall on 8/1. Since then pt had 2 more fa1l r/t weakness. Wife reports since the first fall pt has been unable to use his right arm for basic ADLs. Wife reports pt has increased in trouble understanding tasks since before the first fall. Wife denies  hitting head w/ falls.  Denies blood thinner

## 2021-08-11 NOTE — ED PROVIDER NOTES
49-year-old male patient presents with his wife from outpatient primary care provider's office with concern for stroke. Patient has suffered at least 3 separate falls over the past several weeks. Most recently this occurred 2 nights ago. Patient becomes weak and loses balance falling to the floor. Wife states that since patient's initial fall approximately 2 weeks ago, he has lost the ability to utilize his right side normally. She states he normally feeds himself without difficulty but is unable to do so at this time secondary to weakness. She notes intermittent bouts of slurred speech has been present for several weeks. She states patient is very unsteady on his feet which is contributed to his falls. He reports history of bruised ribs from his falls, denies head strike or loss of consciousness. Patient reports no other pain at this time. Wife denies recent fevers chills or changes in bowel or bladder habits.            Past Medical History:   Diagnosis Date    Chronic kidney disease     Elevated PSA     Hearing difficulty     Hepatitis A 2020    Hypercholesterolemia     Kidney stone        Past Surgical History:   Procedure Laterality Date    HX COLONOSCOPY  05/18/2016         Family History:   Problem Relation Age of Onset    No Known Problems Mother     Cancer Father     No Known Problems Maternal Grandmother     No Known Problems Maternal Grandfather     No Known Problems Paternal Grandmother     No Known Problems Paternal Grandfather        Social History     Socioeconomic History    Marital status:      Spouse name: Not on file    Number of children: Not on file    Years of education: Not on file    Highest education level: Not on file   Occupational History    Not on file   Tobacco Use    Smoking status: Never Smoker    Smokeless tobacco: Never Used   Vaping Use    Vaping Use: Never used   Substance and Sexual Activity    Alcohol use: No     Alcohol/week: 0.0 standard drinks    Drug use: Never    Sexual activity: Never   Other Topics Concern    Not on file   Social History Narrative    Not on file     Social Determinants of Health     Financial Resource Strain:     Difficulty of Paying Living Expenses:    Food Insecurity:     Worried About Running Out of Food in the Last Year:     [address] in the Last Year:    Transportation Needs:     Lack of Transportation (Medical):  Lack of Transportation (Non-Medical):    Physical Activity:     Days of Exercise per Week:     Minutes of Exercise per Session:    Stress:     Feeling of Stress :    Social Connections:     Frequency of Communication with Friends and Family:     Frequency of Social Gatherings with Friends and Family:     Attends Sikh Services:     Active Member of Clubs or Organizations:     Attends Club or Organization Meetings:     Marital Status:    Intimate Partner Violence:     Fear of Current or Ex-Partner:     Emotionally Abused:     Physically Abused:     Sexually Abused: ALLERGIES: Augmentin [amoxicillin-pot clavulanate]    Review of Systems   Constitutional: Negative for chills, diaphoresis and fever. HENT: Negative for congestion, sneezing and sore throat. Eyes: Negative for visual disturbance. Respiratory: Negative for cough, chest tightness, shortness of breath and wheezing. Cardiovascular: Negative for chest pain and leg swelling. Gastrointestinal: Negative for abdominal pain, blood in stool, diarrhea, nausea and vomiting. Endocrine: Negative for polyuria. Genitourinary: Negative for difficulty urinating, dysuria, flank pain, hematuria and urgency. Musculoskeletal: Negative for back pain, myalgias, neck pain and neck stiffness. Skin: Negative for color change and rash. Neurological: Positive for dizziness, speech difficulty and weakness. Negative for syncope, light-headedness, numbness and headaches.    Psychiatric/Behavioral: Negative for behavioral problems. All other systems reviewed and are negative. Vitals:    08/11/21 1429   BP: (!) 150/78   Pulse: 73   Resp: 17   Temp: 98.2 °F (36.8 °C)   SpO2: 95%   Weight: 89.8 kg (198 lb)   Height: 6' (1.829 m)            Physical Exam  Vitals and nursing note reviewed. Constitutional:       General: He is not in acute distress. Appearance: He is well-developed. He is not diaphoretic. Comments: Elderly male patient, alert and oriented to person place and time. No acute distress, speaks in clear, fluid sentences. HENT:      Head: Normocephalic and atraumatic. Comments: No obvious signs of trauma about the head or face, no hemotympanum or stewart sign. Right Ear: External ear normal.      Left Ear: External ear normal.      Nose: Nose normal.   Eyes:      Pupils: Pupils are equal, round, and reactive to light. Cardiovascular:      Rate and Rhythm: Normal rate and regular rhythm. Heart sounds: Normal heart sounds. No murmur heard. No friction rub. No gallop. Pulmonary:      Effort: Pulmonary effort is normal. No respiratory distress. Breath sounds: Normal breath sounds. No stridor. No decreased breath sounds, wheezing, rhonchi or rales. Chest:      Chest wall: No tenderness. Abdominal:      General: There is no distension. Palpations: Abdomen is soft. There is no mass. Tenderness: There is no abdominal tenderness. There is no guarding or rebound. Hernia: No hernia is present. Musculoskeletal:         General: No tenderness or deformity. Normal range of motion. Cervical back: Normal range of motion. Skin:     General: Skin is warm and dry. Neurological:      Mental Status: He is alert and oriented to person, place, and time. GCS: GCS eye subscore is 4. GCS verbal subscore is 5. GCS motor subscore is 6. Cranial Nerves: No cranial nerve deficit. Sensory: Sensation is intact. Motor: Pronator drift present. No weakness. Coordination: Finger-Nose-Finger Test abnormal.      Comments: There is appreciable weakness in the right upper extremity with comparison to the left, slight pronator drift and ataxia is noted in this side as well. No focal findings over the lower extremities. Times, patient's speech does appear to slurred though this is intermittent in nature. He answers questions appropriately but is very hard of hearing. MDM  Number of Diagnoses or Management Options  Diagnosis management comments: Based on patient's exam I do have concern for underlying neurologic cause of his weakness. CT imaging today shows no acute abnormality. Patient certainly on the window for TPA or neuro intervention given his prolonged course of symptoms. Awaiting remainder of labs, chest x-ray and urinalysis.            Amount and/or Complexity of Data Reviewed  Clinical lab tests: ordered and reviewed  Tests in the radiology section of CPT®: ordered and reviewed  Tests in the medicine section of CPT®: ordered and reviewed  Independent visualization of images, tracings, or specimens: yes    Risk of Complications, Morbidity, and/or Mortality  Presenting problems: moderate  Diagnostic procedures: low  Management options: moderate    Patient Progress  Patient progress: stable         Procedures

## 2021-08-11 NOTE — H&P
[facility]   History and Physical       Name:  Brain Martinez  Age: 78 y.o. Sex: male   :  1940    MRN:  [id number]   PCP:  Madina Li MD      Admit Date:  2021  3:17 PM   Presenting Complaint: Extremity Weakness    Reason for Admission:  Acute ischemic stroke [facility]) [I63.9]    Assessment & Plan:     Right Arm weakness, Ataxia concerning for CVA  Frequent falls due to unsteady gait  Symptom onset to 3 weeks ago. CT on arrival without any acute intracranial changes. Not a tPA candidate due to duration of symptoms.  - ASA 81 and lipitor 80mg  - Neuro check q4h  - Telemetry monitoring  - MRI Brain (STAT), Echo w/ bubble study, CTA head/neck   - bedside swallow test / SLP   - f/u A1c, TSH, trops  - PT/OT  - DVT PPx  - glucemic control    Hypertensive emergency  Blood pressure elevated to 225/123. Per wife, patient appears agitated as she is in the hospital.  -Labetalol IV as needed and hydralazine p.o. as needed. -May need schedule antihypertensive medication. CKD Stage3  Cr 1.39 with (eGFR 52) with baseline 1.6-1.4  -Hydration with NS IVF given patient will get CTA with contrast.    BPH: On proscar and flomax. Diet: DIET NPO    VTE ppx: lovenox  GI ppx: pepcid  CODE STATUS: FULL      History of Presenting Illness: Brain Martinez is a 78 y.o. male with medical history of BPH, CKD stage III who presented to ED his PCPs office due to multiple falls within 2 to 3 weeks. Per wife at bed side patient had fallen about 3 times since 2 weeks ago with last fall yesterday evening. Reports she has noticed that he has been getting weaker on his right hand and unable to feed himself he normally would. He also have intermittent slurred speech with word finding difficulty. Had loss of consciousness or head trauma during his falls. Reports that his gait seemed to be unsteady. He is hard of hearing but appears to be alert and oriented x3.   Reports that he is in normal state of health without any fever, chills, shortness of breath, chest pain, abdominal pain, nausea, vomiting except for recent multiple falls. In the ED, patient is noted to be hypertensive to 225/123, saturating well on room air. Laboratory work up is unremarkable. T head without acute intracranial abnormalities. Chest x-ray with no acute cardiopulmonary disease but showed right upper lobe nodule which appeared to be present on prior PET scan. EKG showed sinus rhythm. Review of Systems: 10 systems reviewed and negative except as noted in HPI. Past Medical History:   Diagnosis Date    Chronic kidney disease     Elevated PSA     Hearing difficulty     Hepatitis A 2020    Hypercholesterolemia     Kidney stone        Past Surgical History:   Procedure Laterality Date    HX COLONOSCOPY  05/18/2016       Family History : reviewed.    Family History   Problem Relation Age of Onset    No Known Problems Mother     Cancer Father     No Known Problems Maternal Grandmother     No Known Problems Maternal Grandfather     No Known Problems Paternal Grandmother     No Known Problems Paternal Grandfather         Social History     Tobacco Use    Smoking status: Never Smoker    Smokeless tobacco: Never Used   Substance Use Topics    Alcohol use: No     Alcohol/week: 0.0 standard drinks       Allergies   Allergen Reactions    Augmentin [Amoxicillin-Pot Clavulanate] Nausea and Vomiting       Immunization History   Administered Date(s) Administered    COVID-19, PFIZER, MRNA, LNP-S, PF, 30MCG/0.3ML DOSE 01/23/2021, 02/23/2021    Hep A Vaccine (Adult) 11/03/2020    Influenza High Dose Vaccine PF 01/22/2018, 10/09/2019    Influenza Vaccine (Tri) Adjuvanted (>65 Yrs FLUAD TRI 44743) 10/09/2019    Influenza, Quadrivalent, Adjuvanted (>65 Yrs FLUAD QUAD [id number]) 09/16/2020    Pneumococcal Conjugate (PCV-13) 01/22/2018    Tdap 08/01/2021         PTA Medications:  Current Outpatient Medications   Medication Instructions    finasteride (PROSCAR) 5 mg, Oral, DAILY, TAKE ONE TABLET BY MOUTH EVERY DAY    pantoprazole (PROTONIX) 40 mg, Oral, DAILY    tamsulosin (FLOMAX) 0.4 mg, Oral, DAILY    tiZANidine (ZANAFLEX) 2 mg, Oral, BEDTIME PRN       Objective:     Patient Vitals for the past 24 hrs:   Temp Pulse Resp BP SpO2   08/11/21 1909  66 21 (!) 191/89 96 %   08/11/21 1833  69  (!) 225/123 96 %   08/11/21 1803  69  (!) 207/93 94 %   08/11/21 1749  64 21 (!) 218/88 96 %   08/11/21 1429 98.2 °F (36.8 °C) 73 17 (!) 150/78 95 %       Oxygen Therapy  O2 Sat (%): 96 % (08/11/21 1909)  Pulse via Oximetry: 66 beats per minute (08/11/21 1909)  O2 Device: None (Room air) (08/11/21 1429)    Body mass index is 26.85 kg/m². Physical Exam:     General:     alert, awake, no acute distress. HENT:   normocephalic, atraumatic. Hard of hearing  Eyes:   anicteric sclerae, moist conjunctiva, EOMI  Neck:    supple, non-tender. Trachea midline. Lungs:   CTAB, no wheezing, rhonchi, rales  Cardiac:   RRR, Normal S1 and S2. No S3, S4 or murmurs. Abdomen:   Soft, non distended, nontender, +BS, no guarding/rebound  Extremities:   No edema , pedal pulses present  Skin:   Warn, dry, normnal turgor and texture  Neuro:  AAOx3. No gross focal neurological deficit,+R pronator drift, strength 4/5 on the right, no facial droop or slurred speech. Abnormal finger to nose test on the right. Psychiatric:  No anxiety, calm, cooperative    Data Reviewed: I have reviewed all labs, meds, and studies.     Recent Results (from the past 24 hour(s))   EKG, 12 LEAD, INITIAL    Collection Time: 08/11/21  2:29 PM   Result Value Ref Range    Ventricular Rate 67 BPM    Atrial Rate 67 BPM    P-R Interval 174 ms    QRS Duration 74 ms    Q-T Interval 388 ms    QTC Calculation (Bezet) 409 ms    Calculated P Axis 53 degrees    Calculated R Axis 80 degrees    Calculated T Axis 65 degrees    Diagnosis       Normal sinus rhythm  Low voltage QRS  Cannot rule out Septal infarct , age undetermined  Abnormal ECG  No previous ECGs available  Confirmed by [facility] ([id number]) on 8/11/2021 5:09:49 PM     GLUCOSE, POC    Collection Time: 08/11/21  2:36 PM   Result Value Ref Range    Glucose (POC) 97 65 - 100 mg/dL    Performed by Jose Alejandro    CBC WITH AUTOMATED DIFF    Collection Time: 08/11/21  2:39 PM   Result Value Ref Range    WBC 8.9 4.3 - 11.1 K/uL    RBC 4.80 4.23 - 5.6 M/uL    HGB 15.0 13.6 - 17.2 g/dL    HCT 45.0 41.1 - 50.3 %    MCV 93.8 79.6 - 97.8 FL    MCH 31.3 26.1 - 32.9 PG    MCHC 33.3 31.4 - 35.0 g/dL    RDW 12.0 11.9 - 14.6 %    PLATELET 860 975 - 927 K/uL    MPV 8.9 (L) 9.4 - 12.3 FL    ABSOLUTE NRBC 0.00 0.0 - 0.2 K/uL    DF AUTOMATED      NEUTROPHILS 69 43 - 78 %    LYMPHOCYTES 19 13 - 44 %    MONOCYTES 10 4.0 - 12.0 %    EOSINOPHILS 0 (L) 0.5 - 7.8 %    BASOPHILS 1 0.0 - 2.0 %    IMMATURE GRANULOCYTES 0 0.0 - 5.0 %    ABS. NEUTROPHILS 6.1 1.7 - 8.2 K/UL    ABS. LYMPHOCYTES 1.7 0.5 - 4.6 K/UL    ABS. MONOCYTES 0.9 0.1 - 1.3 K/UL    ABS. EOSINOPHILS 0.0 0.0 - 0.8 K/UL    ABS. BASOPHILS 0.1 0.0 - 0.2 K/UL    ABS. IMM.  GRANS. 0.0 0.0 - 0.5 K/UL   METABOLIC PANEL, BASIC    Collection Time: 08/11/21  2:39 PM   Result Value Ref Range    Sodium 138 136 - 145 mmol/L    Potassium 4.2 3.5 - 5.1 mmol/L    Chloride 104 98 - 107 mmol/L    CO2 26 21 - 32 mmol/L    Anion gap 8 7 - 16 mmol/L    Glucose 95 65 - 100 mg/dL    BUN 17 8 - 23 MG/DL    Creatinine 1.39 0.8 - 1.5 MG/DL    GFR est AA >60 >60 ml/min/1.73m2    GFR est non-AA 52 (L) >60 ml/min/1.73m2    Calcium 9.3 8.3 - 10.4 MG/DL   PROTHROMBIN TIME + INR    Collection Time: 08/11/21  2:39 PM   Result Value Ref Range    Prothrombin time 13.5 12.6 - 14.5 sec    INR 1.0     TROPONIN-HIGH SENSITIVITY    Collection Time: 08/11/21  2:39 PM   Result Value Ref Range    Troponin-High Sensitivity 12.0 0 - 14 pg/mL   URINALYSIS W/ RFLX MICROSCOPIC    Collection Time: 08/11/21  5:52 PM   Result Value Ref Range    Color YELLOW Appearance CLEAR      Specific gravity 1.015 1.001 - 1.023      pH (UA) 5.5 5.0 - 9.0      Protein Negative NEG mg/dL    Glucose Negative mg/dL    Ketone Negative NEG mg/dL    Bilirubin Negative NEG      Blood Negative NEG      Urobilinogen 1.0 0.2 - 1.0 EU/dL    Nitrites Negative NEG      Leukocyte Esterase Negative NEG         All Micro Results     None          Other Studies:  XR CHEST PA LAT    Result Date: 8/11/2021  EXAMINATION: CHEST RADIOGRAPH 8/11/2021 4:57 PM ACCESSION NUMBER: [id number] INDICATION: chest pain COMPARISON: X-rays 8/1/2021, PET CT 12/26/2019 TECHNIQUE: PA  and lateral views of the chest were obtained. FINDINGS: Cardiac Silhouette: Within normal limits in size. Lungs: No focal airspace disease. There is likely a correlate on the lateral view from the right upper lobe nodule demonstrated on the prior PET CT. Pleura: No pleural effusion. No pneumothorax. Osseous Structures: Thoracic spine spondylosis. Upper Abdomen: Unremarkable. 1. No evidence of acute cardiopulmonary disease. 2. Likely round correlate on the lateral view for the right upper lobe nodule demonstrated on the prior PET CT. Please see the prior PET CT for management recommendations. VOICE DICTATED BY: Dr. Fatimah Green CONT    Result Date: 8/11/2021  EXAMINATION: HEAD CT WITHOUT CONTRAST 8/11/2021 3:57 PM ACCESSION NUMBER: [id number] INDICATION: weakness, falls COMPARISON: None available TECHNIQUE: Multiple-row detector helical CT examination of the head without intravenous contrast. Radiation dose reduction techniques were used for this study:  Our CT scanners use one or all of the following: Automated exposure control, adjustment of the mA and/or kVp according to patient's size, iterative reconstruction. FINDINGS: The ventricles are within normal limits for the degree of global brain parenchymal volume loss. There is no midline shift. The basilar cisterns are patent.  There is no cerebellar tonsillar ectopia or herniation. Hypodensities in the periventricular and subcortical white matter are nonspecific, but they are most likely to represent chronic microangiopathy. There is no evidence of acute intracranial hemorrhage or extra-axial collections. There is no CT evidence of acute infarction. No evidence of skull fracture. The paranasal sinuses and mastoid air cells are well aerated and clear. No acute intracranial abnormality. VOICE DICTATED BY: Dr. Sundar Cordova         Medications:      Problem List:     Hospital Problems as of 8/11/2021 Date Reviewed: 6/30/2021        Codes Class Noted - Resolved POA    Acute ischemic stroke [facility]) ICD-10-CM: I63.9  ICD-9-CM: 434.91  8/11/2021 - Present Yes        Essential hypertension ICD-10-CM: I10  ICD-9-CM: 401.9  6/6/2020 - Present Yes        Benign prostatic hyperplasia with nocturia ICD-10-CM: N40.1, R35.1  ICD-9-CM: 600.01, 788.43  1/23/2020 - Present Yes        CKD (chronic kidney disease) stage 3, GFR 30-59 ml/min ([facility]) ICD-10-CM: N18.30  ICD-9-CM: 585.3  12/2/2019 - Present Yes        Bilateral hearing loss ICD-10-CM: H91.93  ICD-9-CM: 389.9  12/2/2019 - Present Yes                 Part of this note was written by using a voice dictation software and the note has been proof read but may still contain some grammatical/other typographical errors.        Amanda Thomas MD  [facility] Hospitalist Service  August 11, 2021    6:10 PM

## 2021-08-12 PROBLEM — R29.898 RIGHT ARM WEAKNESS: Status: ACTIVE | Noted: 2021-01-01

## 2021-08-12 PROBLEM — R27.0 ATAXIA: Status: ACTIVE | Noted: 2021-01-01

## 2021-08-12 PROBLEM — R29.6 MULTIPLE FALLS: Status: ACTIVE | Noted: 2021-01-01

## 2021-08-12 NOTE — PROGRESS NOTES
08/12/21 0800   NIH Stroke Scale   Interval Handoff/Transfer   Level of Conciousness (1a) 0   LOC Questions (1b) (!) 1   LOC Commands (1c) 0   Best Gaze (2) 0   Visual (3) 0   Facial Palsy (4) 0   Motor Arm, Left (5a) 0   Motor Arm, Right (5b) 0   Motor Leg, Left (6a) 0   Motor Leg, Right (6b) 0   Limb Ataxia (7) 0   Sensory (8) 0   Best Language (9) 0   Dysarthria (10) 0   Extinction and Inattention (11) 0   Total 1   Dual NIH with Carlos Parr RN

## 2021-08-12 NOTE — PROGRESS NOTES
TRANSFER - IN REPORT:    Verbal report received from [facility], RN on [address]  being received from ED for routine progression of care      Report consisted of patients Situation, Background, Assessment and   Recommendations(SBAR). Information from the following report(s) SBAR was reviewed with the receiving nurse. Opportunity for questions and clarification was provided. Assessment completed upon patients arrival to unit and care assumed.

## 2021-08-12 NOTE — PROGRESS NOTES
Physical Therapy Note:    Physical therapy evaluation orders received and chart reviewed. Attempted to see patient this AM to initiate assessment. Patient off floor at X-Ray for MBS. Will follow and re-attempt at a later time/date as schedule permits/patient available.     Thank you,  [place]ena Li, PT, DPT

## 2021-08-12 NOTE — PROGRESS NOTES
[facility]ist Progress Note    Patient: Serena Fierro MRN: [id number]  SSN: [id number]    YOB: 1940  Age: 78 y.o. Sex: male      Admit Date: 8/11/2021    LOS: 1 day     Subjective:     Chief Complaint: Right arm weakness, ataxia, and multiple falls x 3 weeks  Reason for Admission: Hypertensive emergency    78 y.o. male with medical history of BPH, CKD stage III who presented to ED his PCPs office due to multiple falls within 2 to 3 weeks. Per wife at bed side patient had fallen about 3 times since 2 weeks ago with last fall yesterday evening. Reports she has noticed that he has been getting weaker on his right hand and unable to feed himself he normally would. He also have intermittent slurred speech with word finding difficulty. Had loss of consciousness or head trauma during his falls. Reports that his gait seemed to be unsteady. He is hard of hearing but appears to be alert and oriented x3. Reports that he is in normal state of health without any fever, chills, shortness of breath, chest pain, abdominal pain, nausea, vomiting except for recent multiple falls. In the ED, patient is noted to be hypertensive to 225/123, saturating well on room air. Laboratory work up is unremarkable. T head without acute intracranial abnormalities. Chest x-ray with no acute cardiopulmonary disease but showed right upper lobe nodule which appeared to be present on prior PET scan. 8/12 - He feels better today. Not as agitated today about being in the hospital. Still with right arm weakness and not able to walk straight. Review of systems negative except stated above.     Assessment:     Primary Diagnosis: Hypertensive emergency    Hospital Problems as of 8/12/2021 Date Reviewed: 6/30/2021        Codes Class Noted - Resolved POA    * (Principal) Hypertensive emergency ICD-10-CM: I16.1  ICD-9-CM: 401.9  8/11/2021 - Present Yes        Ataxia ICD-10-CM: R27.0  ICD-9-CM: 781.3  8/12/2021 - Present Yes        Right arm weakness ICD-10-CM: R29.898  ICD-9-CM: 729.89  8/11/2021 - Present Yes        Multiple falls ICD-10-CM: R29.6  ICD-9-CM: V15.88  8/12/2021 - Present Yes        Essential hypertension ICD-10-CM: I10  ICD-9-CM: 401.9  6/6/2020 - Present Yes        CKD (chronic kidney disease) stage 3, GFR 30-59 ml/min ([facility]) ICD-10-CM: N18.30  ICD-9-CM: 585.3  12/2/2019 - Present Yes        Benign prostatic hyperplasia with nocturia ICD-10-CM: N40.1, R35.1  ICD-9-CM: 600.01, 788.43  1/23/2020 - Present Yes        Bilateral hearing loss ICD-10-CM: H91.93  ICD-9-CM: 389.9  12/2/2019 - Present Yes              Active Medical Issues and Plan (MDM):     Principal Problem:    Hypertensive emergency (8/11/2021)  - Per patient and wife, this was due to patient being extremely agitated at being admitted  - SBP now 126 with no meds  - Stroke work up unremarkable  - Continue to monitor    Active Problems:    Right arm weakness (8/11/2021)  - Unsure etiology  - Has objective weakness  - ? Cervical  - Stroke work up negative  - Consult Neurology for assistance      Ataxia (8/12/2021)  - Unsure etiology  - Nose-to-finger normal  - No cerebellar issues on MRI  - PT/OT recommended SNF  - Consult Neurology for assistance      Multiple falls (8/12/2021)  - PT/OT      CKD (chronic kidney disease) stage 3, GFR 30-59 ml/min ([facility]) (12/2/2019)  - Stable      Essential hypertension (6/6/2020)  - BP now normal  - No meds given      Otherwise all chronic medical issues appear stable with no changes. Diet: ADULT DIET Easy to Chew; Mildly Thick (Nectar);  No Drinking Straws  VTE ppx: Lovenox    Objective:     Visit Vitals  /71 (BP 1 Location: Left upper arm, BP Patient Position: Semi fowlers)   Pulse 78   Temp 97.9 °F (36.6 °C)   Resp 17   Ht 6' (1.829 m)   Wt 89.8 kg (198 lb)   SpO2 96%   BMI 26.85 kg/m²      Oxygen Therapy  O2 Sat (%): 96 % (08/12/21 1622)  Pulse via Oximetry: 66 beats per minute (08/11/21 1909)  O2 Device: None (Room air) (08/11/21 2031)      Intake and Output:     Intake/Output Summary (Last 24 hours) at 8/12/2021 1630  Last data filed at 8/12/2021 1005  Gross per 24 hour   Intake    Output 450 ml   Net -450 ml         Physical Exam:   GENERAL: alert, cooperative, no distress, appears stated age  EYE: conjunctivae/corneas clear. PERRL. THROAT & NECK: normal and no erythema or exudates noted. LUNG: clear to auscultation bilaterally  HEART: regular rate and rhythm, S1S2, no murmur, no JVD  ABDOMEN: soft, non-tender, non-distended. Bowel sounds normal.   EXTREMITIES:  No edema, 2+ pedal/radial pulses bilaterally  SKIN: no rash or abnormalities  NEUROLOGIC: A&Ox3. Cranial nerves 2-12 grossly intact. RUE 4/5 strength.     Lab/Data Review:  Recent Results (from the past 24 hour(s))   URINALYSIS W/ RFLX MICROSCOPIC    Collection Time: 08/11/21  5:52 PM   Result Value Ref Range    Color YELLOW      Appearance CLEAR      Specific gravity 1.015 1.001 - 1.023      pH (UA) 5.5 5.0 - 9.0      Protein Negative NEG mg/dL    Glucose Negative mg/dL    Ketone Negative NEG mg/dL    Bilirubin Negative NEG      Blood Negative NEG      Urobilinogen 1.0 0.2 - 1.0 EU/dL    Nitrites Negative NEG      Leukocyte Esterase Negative NEG     CBC W/O DIFF    Collection Time: 08/12/21  5:31 AM   Result Value Ref Range    WBC 8.8 4.3 - 11.1 K/uL    RBC 4.63 4.23 - 5.6 M/uL    HGB 14.2 13.6 - 17.2 g/dL    HCT 42.0 41.1 - 50.3 %    MCV 90.7 79.6 - 97.8 FL    MCH 30.7 26.1 - 32.9 PG    MCHC 33.8 31.4 - 35.0 g/dL    RDW 11.9 11.9 - 14.6 %    PLATELET 247 159 - 324 K/uL    MPV 9.3 (L) 9.4 - 12.3 FL    ABSOLUTE NRBC 0.00 0.0 - 0.2 K/uL   LIPID PANEL    Collection Time: 08/12/21  5:31 AM   Result Value Ref Range    Cholesterol, total 131 <200 MG/DL    Triglyceride 88 35 - 150 MG/DL    HDL Cholesterol 32 (L) 40 - 60 MG/DL    LDL, calculated 81.4 <100 MG/DL    VLDL, calculated 17.6 6.0 - 23.0 MG/DL    CHOL/HDL Ratio 4.1     HEMOGLOBIN A1C WITH EAG Collection Time: 08/12/21  5:31 AM   Result Value Ref Range    Hemoglobin A1c 5.6 4.2 - 6.3 %    Est. average glucose 403 mg/dL   METABOLIC PANEL, BASIC    Collection Time: 08/12/21  5:31 AM   Result Value Ref Range    Sodium 141 138 - 145 mmol/L    Potassium 3.6 3.5 - 5.1 mmol/L    Chloride 107 98 - 107 mmol/L    CO2 26 21 - 32 mmol/L    Anion gap 8 7 - 16 mmol/L    Glucose 87 65 - 100 mg/dL    BUN 17 8 - 23 MG/DL    Creatinine 1.10 0.8 - 1.5 MG/DL    GFR est AA >60 >60 ml/min/1.73m2    GFR est non-AA >60 >60 ml/min/1.73m2    Calcium 8.9 8.3 - 10.4 MG/DL   ECHO ADULT COMPLETE    Collection Time: 08/12/21 11:00 AM   Result Value Ref Range    IVSd 0.99 0.60 - 1.00 cm    LVIDd 4.93 4.20 - 5.90 cm    LVIDs 3.20 cm    LVOT d 2.39 cm    LVPWd 0.92 0.60 - 1.00 cm    BP EF 61.8 55.0 - 100.0 percent    LV Ejection Fraction MOD 2C 61 percent    LV Ejection Fraction MOD 4C 64 percent    LV ED Vol A2C 48.02 mL    LV ED Vol A4C 101.95 mL    LV ED Vol BP 69.86 67.0 - 155.0 mL    LV ES Vol A2C 18.64 mL    LV ES Vol A4C 36.83 mL    LV ES Vol BP 26.67 22.0 - 58.0 mL    LVOT Peak Gradient 8.10 mmHg    Left Ventricular Outflow Tract Mean Gradient 3.91 mmHg    LVOT .3 mL    LVOT Peak Velocity 142.34 cm/s    LVOT VTI 28.48 cm    Left Atrium Major Axis 3.76 cm    Aortic Valve Area by Continuity of Peak Velocity 3.08 cm2    Aortic Valve Area by Continuity of Peak Velocity 3.08 cm2    Aortic Valve Area by Continuity of VTI 3.09 cm2    Aortic Valve Area by Continuity of VTI 3.09 cm2    AoV PG 17.08 mmHg    Aortic Valve Systolic Mean Gradient 7.56 mmHg    Aortic Valve Systolic Peak Velocity 887.13 cm/s    AoV VTI 41.16 cm    MV A Joe 79.50 cm/s    Mitral Valve E Wave Deceleration Time 293.63 ms    MV E Joe 57.65 cm/s    Mitral Valve Pressure Half-time 85.15 ms    MVA (PHT) 2.58 cm2    Tapse 2.64 (A) 1.50 - 2.00 cm    Triscuspid Valve Regurgitation Peak Gradient 31.77 mmHg    TR Max Velocity 281.81 cm/s    Ao Root D 3.27 cm    IVC proximal 1.42 cm    LV E' Septal Velocity 8.00 cm/s    LV E' Lateral Velocity 7.00 cm/s    MV E/A 0.73     LV Mass .2 88.0 - 224.0 g    LV Mass AL Index 78.8 49.0 - 115.0 g/m2    E/E' lateral 8.24     E/E' septal 7.21     RVSP 34.8 mmHg    E/E' ratio (averaged) 7.72     LVES Vol Index BP 12.6 mL/m2    LVED Vol Index BP 33.0 mL/m2    Left Atrium Minor Axis 1.77 cm    LVED Vol Index A4C 48.1 mL/m2    LVED Vol Index A2C 22.7 mL/m2    LVES Vol Index A4C 17.4 mL/m2    LVES Vol Index A2C 8.8 mL/m2       SARS-CoV-2 Lab Results  \"Novel Coronavirus\" Test: No results found for: COV2NT   \"Emergent Disease\" Test: No results found for: EDPR  \"SARS-COV-2\" Test: No results found for: XGCOVT  \"[facility]\" Test: No results found for: RSLT  Rapid Test: No results found for: COVR        Imaging:  XR CHEST PA LAT    Result Date: 8/11/2021  EXAMINATION: CHEST RADIOGRAPH 8/11/2021 4:57 PM ACCESSION NUMBER: [id number] INDICATION: chest pain COMPARISON: X-rays 8/1/2021, PET CT 12/26/2019 TECHNIQUE: PA  and lateral views of the chest were obtained. FINDINGS: Cardiac Silhouette: Within normal limits in size. Lungs: No focal airspace disease. There is likely a correlate on the lateral view from the right upper lobe nodule demonstrated on the prior PET CT. Pleura: No pleural effusion. No pneumothorax. Osseous Structures: Thoracic spine spondylosis. Upper Abdomen: Unremarkable. 1. No evidence of acute cardiopulmonary disease. 2. Likely round correlate on the lateral view for the right upper lobe nodule demonstrated on the prior PET CT. Please see the prior PET CT for management recommendations. VOICE DICTATED BY: Dr. Blair Hart    XR RIBS RT UNI 2 V    Result Date: 8/1/2021  EXAMINATION: XR RIBS RT UNI 2 V  8/1/2021 9:06 AM COMPARISON: None available INDICATION: Fall with right rib pain TECHNIQUE: 3 views of the right ribs were obtained FINDINGS: No fractures or other acute bony abnormalities are seen.   The lungs are free from infiltrate or nodule. The surrounding soft tissues are preserved. 1.  No acute abnormality. XR SPINE LUMB 2 OR 3 V    Result Date: 8/1/2021  EXAMINATION: XR SPINE LUMB 2 OR 3 V 8/1/2021 9:06 AM ACCESSION NUMBER: [id number] COMPARISON: None available INDICATION: Fall with right lower back pain TECHNIQUE: 3 views of the lumbar spine were obtained. FINDINGS: Vertebral body alignment: There is mild S-shaped curvature of the lumbar spine. There is no vertebral anomaly. Vertebral body heights: Preserved Degenerative changes: There are mild multilevel degenerative changes with disc space narrowing, sclerosis, and small anterior osteophytes. Post surgical changes: None Included abdominal soft tissues: Calcifications are seen in the aorta and it's major branches. 1. No acute abnormality 2. Multilevel degenerative changes 3. Atherosclerosis     XR SWALLOW FUNC VIDEO    Result Date: 8/12/2021  Modified Barium Swallow INDICATION: Dysphagia, CVA Fluoro time: 2.31 minutes Spot films:  0 Fluoroscopy was used to evaluate pharyngeal function while the patient was given barium solutions and barium coated solids. FINDINGS: Silent aspiration during swallow with thin barium by cup. Shallow penetration with mildly thick barium by straw. No penetration or aspiration with remaining administered consistencies. Aspiration and penetration, as above. Please see concurrent speech and language pathology report for a complete description of findings. MRI BRAIN WO CONT    Result Date: 8/12/2021  Exam: MRI BRAIN WO CONT on 8/12/2021 10:22 AM Clinical History: The Male patient is 78years old presenting for poss cva, with weakness-No trauma-No surgery-No cancer-No prior. Comparison:  Head CT 8/11/2021 Technique:  Axial T2, axial FLAIR, axial diffusion-weighted,  sagittal T1 and coronal gradient-echo scans were performed. Findings: Age-related senescent changes are seen with sulcal and ventricular prominence.  There is mild chronic confluent periventricular white matter disease with scattered lacunae throughout the corona radiata and centrum semiovale. No evidence of restricted diffusion seen to suggest acute ischemia. There are no abnormal extra-axial fluid collections. No evidence of mass or mass effect is seen. There is no diffusion signal abnormality. Expected flow voids are maintained in the major intracranial vessels. Cerebellar involutional changes are seen. Visualized brainstem structures are unremarkable. There is no evidence of Chiari malformation. The ventricular system and CSF containing spaces are proportionate to the degree of cerebral involution. . Visualized extracranial soft tissues are unremarkable. The paranasal sinuses are well pneumatized and aerated. 1.  Age-related senescent changes and chronic microvascular disease without acute intracranial abnormality. CPT code(s) [id number]     CT HEAD WO CONT    Result Date: 8/11/2021  EXAMINATION: HEAD CT WITHOUT CONTRAST 8/11/2021 3:57 PM ACCESSION NUMBER: [id number] INDICATION: weakness, falls COMPARISON: None available TECHNIQUE: Multiple-row detector helical CT examination of the head without intravenous contrast. Radiation dose reduction techniques were used for this study:  Our CT scanners use one or all of the following: Automated exposure control, adjustment of the mA and/or kVp according to patient's size, iterative reconstruction. FINDINGS: The ventricles are within normal limits for the degree of global brain parenchymal volume loss. There is no midline shift. The basilar cisterns are patent. There is no cerebellar tonsillar ectopia or herniation. Hypodensities in the periventricular and subcortical white matter are nonspecific, but they are most likely to represent chronic microangiopathy. There is no evidence of acute intracranial hemorrhage or extra-axial collections. There is no CT evidence of acute infarction. No evidence of skull fracture.   The paranasal sinuses and mastoid air cells are well aerated and clear. No acute intracranial abnormality. VOICE DICTATED BY: Dr. Ansley Wen CONT    Result Date: 8/12/2021  Title:  CT arteriogram of the neck and head. Indication: Right arm weakness. Ataxia. Stroke like symptoms. . Technique: Axial images of the neck and head were obtained after the uneventful administration of intravenous iodinated contrast media. Contrast was used to best identify the arterial structures. Images were reviewed on a separate, free standing, three-dimensional workstation as per the referring physicians request.  All stenosis percentages derived by comparing the narrowest segment with the distal Internal Carotid Artery luminal diameter, as described in the [place] American Symptomatic Carotid Endarterectomy Trial (NASCET) criteria. All CT scans at this facility are performed using dose reduction/dose modulation techniques, as appropriate the performed exam, including the following: Automated Exposure Control; Adjustment of the mA and/or kV according to patient size (this includes techniques or standardized protocols for targeted exams where dose is matched to indication/reason for exam); and Use of Iterative Reconstruction Technique. Comparison: None. Findings: Lungs:  No focal consolidation or pleural effusions. No suspicious pulmonary nodules. .  Bones:  No osseous destruction. Moderate multilevel degenerative changes in the cervical spine. This most pronounced at C3-C4 where there is a prominent posterior disc osteophyte complex which appears to cause spinal canal narrowing. Paranasal sinuses:  Paranasal sinuses are clear. .  Brain:  No midline shift. No enhancing mass lesion or hydrocephalus. .  Soft tissues:  Within normal limits. .  Dural venous sinuses:  Patent. Aortic arch:  Non-aneurysmal. Arch vessels are patent. Moderate atherosclerotic changes are present.  ANTERIOR CIRCULATION Right common carotid artery:  No significant stenosis or occlusion. Right internal carotid artery:  No significant stenosis or occlusion. Moderate atherosclerotic changes along the right carotid bulb without significant stenosis. Vascular calcifications are present along the carotid siphon. Right middle cerebral artery:  No significant stenosis, occlusion, or aneurysm. Right anterior cerebral artery:  No significant stenosis, occlusion, or aneurysm. Left common carotid artery: No significant stenosis or occlusion. Left internal carotid artery:  No significant stenosis or occlusion. Mild to moderate atherosclerotic changes of the left carotid bulb without significant stenosis. As the calcifications are present along the carotid siphon. Left middle cerebral artery:  No significant stenosis, occlusion, or aneurysm. Left anterior cerebral artery:  No significant stenosis, occlusion, or aneurysm. Anterior communicating artery: No significant stenosis, occlusion, or aneurysm. POSTERIOR CIRCULATION Right vertebral artery:  No significant stenosis or occlusion. Left vertebral artery:  No significant stenosis or occlusion. Basilar artery:  No significant stenosis, occlusion, or aneurysm. Right posterior communicating artery: No significant stenosis, occlusion, or aneurysm. Left posterior communicating artery:  No significant stenosis, occlusion, or aneurysm. Right posterior cerebral artery:  No significant stenosis, occlusion, or aneurysm. Fetal type right PCA. Left posterior cerebral artery:  No significant stenosis, occlusion, or aneurysm. No acute large vessel occlusion or high-grade stenosis. Moderate atherosclerotic changes. ECHO ADULT COMPLETE    Result Date: 8/12/2021  · LV: Estimated LVEF is 55 - 60%. Normal cavity size, wall thickness and systolic function (ejection fraction normal). Age-appropriate left ventricular diastolic function. · Right Ventricle: Normal cavity size and global systolic function.  · AV: Moderate aortic valve sclerosis. Aortic valve leaflet calcification present. Mild aortic valve regurgitation is present. · PV: Mild pulmonic valve regurgitation is present. · TV: Mild tricuspid valve regurgitation is present. · MV: Mild mitral valve regurgitation is present. No results found for this visit on 08/11/21. Cultures: All Micro Results     None          Discharge Plan:     Home in 24-48 hours. Patient and wife refusing SNF despite PT/OT's recs.     Signed By: Lovely Blum DO     August 12, 2021

## 2021-08-12 NOTE — PROGRESS NOTES
08/11/21 2025   NIH Stroke Scale   Interval Other (comment)  (dual w/ Taurus, RN)   Level of Conciousness (1a) 0   LOC Questions (1b) (!) 1   LOC Commands (1c) 0   Best Gaze (2) 0   Visual (3) 0   Facial Palsy (4) 0   Motor Arm, Left (5a) 0   Motor Arm, Right (5b) 0   Motor Leg, Left (6a) 0   Motor Leg, Right (6b) 0   Limb Ataxia (7) 0   Sensory (8) 0   Best Language (9) 0   Dysarthria (10) 0   Extinction and Inattention (11) 0   Total 1

## 2021-08-12 NOTE — PROGRESS NOTES
LTG: Patient will tolerate least restrictive diet without overt signs or symptoms of airway compromise. STG: Patient will participate in modified barium swallow study as clinically indicated. SPEECH LANGUAGE PATHOLOGY: DYSPHAGIA- Initial Assessment    NAME/AGE/GENDER: Brain Martinez is a 78 y.o. male  DATE: 8/12/2021  PRIMARY DIAGNOSIS: Acute ischemic stroke ([facility] [address] [I63.9]      ICD-10: Treatment Diagnosis: R13.12 Dysphagia, Oropharyngeal Phase    RECOMMENDATIONS   DIET:    NPO except ice chips and meds until MBS    MEDICATIONS: Crushed in puree or applesauce     ASPIRATION PRECAUTIONS  · Slow rate of intake  · Small bites/sips  · Upright at 90 degrees during meal     COMPENSATORY STRATEGIES/MODIFICATIONS  · None     RECOMMENDATIONS for CONTINUED SPEECH THERAPY:   YES: Anticipate need for ongoing speech therapy during this hospitalization and at next level of care. ASSESSMENT   Patient presents with concerns for aspiration with thin liquids as evidenced by double swallow, throat clearing,and episodes of immediate coughing. Recommend NPO except ice chips and meds. Will plan for modified barium swallow study this AM to identify safest least restrictive diet. EDUCATION:  · Recommendations discussed with Patient, Family, MD and RN  CONTINUATION OF SKILLED SERVICES/MEDICAL NECESSITY:   Patient is expected to demonstrate progress in  swallow strength, swallow timeliness, swallow function and swallow safety in order to  improve swallow safety, work toward diet advancement and decrease aspiration risk.  Patient continues to require skilled intervention due to dysphagia. REHABILITATION POTENTIAL FOR STATED GOALS: Excellent    PLAN    FREQUENCY/DURATION: Continue to follow patient 3 times a week for duration of hospital stay to address above goals.     - Recommendations for next treatment session: Next treatment session will address Modified Barium Swallow Study    SUBJECTIVE   Upright, alert in bed wife present. RNs present. Oxygen Device: room air  Pain: Pain Scale 1: Numeric (0 - 10)  Pain Intensity 1: 0    History of Present Injury/Illness: Mr. German Viera  has a past medical history of Chronic kidney disease, Elevated PSA, Hearing difficulty, Hepatitis A (2020), Hypercholesterolemia, and Kidney stone. [place] [facility] also  has a past surgical history that includes hx colonoscopy (05/18/2016). PRECAUTIONS/ALLERGIES: Augmentin [amoxicillin-pot clavulanate]     Problem List:  (Impairments causing functional limitations):  1. Oropharyngeal dysphagia     Previous Dysphagia: YES reportedly history of EGD with dilation and coughs with liquids patient attributes to sinus drainage  Diet Prior to Evaluation: regular/thin    Orientation:  Person  Place  Time  Situation    Cognitive-Linguistic Screening:   Alertness  o Alert   Speech Production:   o intelligible at conversation level  [facility] Language:  o Word finding deficits noted   Receptive Language:  o Answers yes/no questions appropriately and Follows commands-at times needs repetition for more complex info   Cognition:   o Some confusion, increased time for responses   Prior Level of Function: lives with wife. Independent   Recommendations: Given results of screening, further evaluation is indicated to assess cognitive linguistic functioning. OBJECTIVE   Oral Motor:   · Labial: No impairment  · Dentition: Upper Dentures and Lower Dentures  · Oral Hygiene: Adequate  · Lingual: Decreased strength    Dysphagia evaluation:   Patient self fed thin via cup and straw, puree, and cracker. Incoordinated acceptance, double swallow and coughing on first trial that appeared just related to acceptance/coordination as water/ice dumped into patient's mouth. Presented thin via cup again with improved acceptance/control, however still double swallow and throat clearing and episodes of coughing. Immediate coughing with thin via straw.  Tolerated puree and cracker and meds crushed in jaquelinee with RN. Tool Used: Dysphagia Outcome and Severity Scale (ANITA)    Score Comments   Normal Diet  [] 7 With no strategies or extra time needed   Functional Swallow  [] 6 May have mild oral or pharyngeal delay   Mild Dysphagia  [] 5 Which may require one diet consistency restricted    Mild-Moderate Dysphagia  [] 4 With 1-2 diet consistencies restricted   Moderate Dysphagia  [] 3 With 2 or more diet consistencies restricted   Moderate-Severe Dysphagia  [] 2 With partial PO strategies (trials with ST only)   Severe Dysphagia  [] 1 With inability to tolerate any PO safely      Score:  Initial: 2 Most Recent: x (Date 08/12/21 )   Interpretation of Tool: The Dysphagia Outcome and Severity Scale (ANITA) is a simple, easy-to-use, 7-point scale developed to systematically rate the functional severity of dysphagia based on objective assessment and make recommendations for diet level, independence level, and type of nutrition. Current Medications:   No current facility-administered medications on file prior to encounter. Current Outpatient Medications on File Prior to Encounter   Medication Sig Dispense Refill    tamsulosin (FLOMAX) 0.4 mg capsule Take 1 Capsule by mouth daily.  finasteride (PROSCAR) 5 mg tablet Take 1 Tab by mouth daily. TAKE ONE TABLET BY MOUTH EVERY DAY 90 Tab 1    tiZANidine (ZANAFLEX) 2 mg tablet Take 1 Tablet by mouth nightly as needed for Muscle Spasm(s). (Patient not taking: Reported on 8/11/2021) 30 Tablet 0    pantoprazole (PROTONIX) 40 mg tablet Take 1 Tab by mouth daily.  (Patient not taking: Reported on 8/11/2021) 90 Tab 1        INTERDISCIPLINARY COLLABORATION: RN    After treatment position/precautions:  · Upright in bed  · RN notified    Total Treatment Duration:   Time In: 1135  Time Out: [address], [facility], [address] [place] [address]

## 2021-08-12 NOTE — DISCHARGE INSTRUCTIONS

## 2021-08-12 NOTE — PROGRESS NOTES
08/12/21 1900   NIH Stroke Scale   Interval Handoff/Transfer   Level of Conciousness (1a) 0   LOC Questions (1b) (!) 1   LOC Commands (1c) 0   Best Gaze (2) 0   Visual (3) 0   Facial Palsy (4) 0   Motor Arm, Left (5a) 0   Motor Arm, Right (5b) 0   Motor Leg, Left (6a) 0   Motor Leg, Right (6b) 0   Limb Ataxia (7) 0   Sensory (8) 0   Best Language (9) 0   Dysarthria (10) 0   Extinction and Inattention (11) 0   Total 1

## 2021-08-12 NOTE — PROGRESS NOTES
08/11/21 2025   Dual Skin Pressure Injury Assessment   Dual Skin Pressure Injury Assessment WDL   Second Care Provider (Based on [address] [place] [address]) [place], RN   Skin Integumentary   Skin Integumentary (WDL) X    Pressure  Injury Documentation No Pressure Injury Noted-Pressure Ulcer Prevention Initiated   Skin Color Ecchymosis (comment)  (scattered)   Skin Condition/Temp Dry;Warm;Fragile   Skin Integrity Intact   Turgor Epidermis thin w/ loss of subcut tissue   Hair Growth Present   Nails WDL   Varicosities Absent   Wound Prevention and Protection Methods   Orientation of Wound Prevention Posterior   Location of Wound Prevention Sacrum/Coccyx   Dressing Present  No   Wound Offloading (Prevention Methods) Bed, pressure reduction mattress

## 2021-08-12 NOTE — PROGRESS NOTES
ACUTE PHYSICAL THERAPY GOALS:  (Developed with and agreed upon by patient and/or caregiver.)    (1.) Daren Mcconnell  will move from supine to sit and sit to supine , scoot up and down and roll side to side with CONTACT GUARD ASSIST within 7 treatment day(s). (2.) Daren Mcconnell will transfer from bed to chair and chair to bed with MODIFIED INDEPENDENCE using the least restrictive device within 7 treatment day(s). (3.) Daren Mcconnell will ambulate with MODIFIED INDEPENDENCE for 250 feet with the least restrictive device within 7 treatment day(s). (4.) Daren Mcconnell will perform standing static and dynamic balance activities x 20 minutes with STAND BY ASSIST to improve safety within 7 treatment day(s). (5.) Daren Mcconnell will ascend and descend 1 stair with CONTACT GUARD ASSIST to improve functional mobility and safety within 7 treatment day(s). (6.) Daren Mcconnell will perform bilateral lower extremity exercises x 20 min for HEP with INDEPENDENCE to improve strength, endurance, and functional mobility within 7 treatment day(s).      PHYSICAL THERAPY ASSESSMENT: Initial Assessment and PM PT Treatment Day # 1      Daren Mcconnell is a 78 y.o. male   PRIMARY DIAGNOSIS: Acute ischemic stroke ([facility] [address]  Acute ischemic stroke ([facility] [address] [I63.9]       Reason for Referral:    ICD-10: Treatment Diagnosis: Generalized Muscle Weakness (M62.81)  Other lack of cordination (R27.8)  Difficulty in walking, Not elsewhere classified (R26.2)  Other abnormalities of gait and mobility (R26.89)  Repeated Falls (R29.6)  History of falling (Z91.81)  INPATIENT: Payor: [employer] MEDICARE / Plan: [facility] [employer]NA MEDICARE HMO / Product Type: Managed Care Medicare /     ASSESSMENT:     REHAB RECOMMENDATIONS:   Recommendation to date pending progress:  [address] [place] [address]  Equipment:    To Be Determined     PRIOR LEVEL OF FUNCTION:  (Prior to Hospitalization) INITIAL/CURRENT LEVEL OF FUNCTION:  (Most Recently Demonstrated)   Bed Mobility:   Independent  Sit to Stand:   Independent  Transfers:   Independent  Gait/Mobility:   Independent Bed Mobility:   Moderate Assistance  Sit to Stand:   Mod-Max A  Transfers:   Mod-Max A  Gait/Mobility:   Min-CGA     ASSESSMENT:  Mr. Tiffanie Lee is a 78year old M who presents w/ CVA workup. This date pt performs mobility including bed mobility, sit to stand, amb to bathroom and in room for 60 ft., and stand to sit in chair with mod a for bed mobility, mod-max a for transfers and min-CGA for ambulation w/ RW. Sensation was intact and equilateral and 4+/5 strength B LE. Pt was delayed in response time and decreased awareness of obstacles and objects in his R visual field. He required multiple VC regarding using the RW appropriately. Pt presents as functioning below his baseline, with deficits in mobility including transfers, gait, balance, and activity tolerance. Pt will benefit from skilled therapy services to address stated deficits to promote return to highest level of function, independence, and safety. Will continue to follow. SUBJECTIVE:   Mr. Tiffanie Lee states, \"I have been falling lately. \"    SOCIAL HISTORY/LIVING ENVIRONMENT: Lives w/ wife in 1 story home w/ 1 MARCELLO w/ no HR. Tub shower. Does not use AD. Has RW. Multiple falls in the past month.   Home Environment: Private residence  One/Two Story Residence: One story  Living Alone: No  Support Systems: Family member(s), Friends \ neighbors  OBJECTIVE:     PAIN: VITAL SIGNS: LINES/DRAINS:   Pre Treatment: Pain Screen  Pain Scale 1: Numeric (0 - 10)  Pain Intensity 1: 2  Pain Location 1: Rib cage  Post Treatment: 2   none  O2 Device: None (Room air)     GROSS EVALUATION:  B LE Within Functional Limits Abnormal/ Functional Abnormal/ Non-Functional (see comments) Not Tested Comments:   AROM [x] [] [] []    PROM [x] [] [] []    Strength [] [x] [] []    Balance [] [x] [] []    Posture [] [x] [] []    Sensation [x] [] [] [] Coordination [] [] [x] [] Decreased coordination during ambulation, unsteady gait   Tone [x] [] [] []    Edema [] [] [] [x]    Activity Tolerance [] [x] [] []     [] [] [] []      COGNITION/  PERCEPTION: Intact Impaired   (see comments) Comments:   Orientation [x] []    Vision [] [x] Deficits in R visual field   Hearing [] [x] Hard of hearing   Command Following [x] []    Safety Awareness [x] []     [] []      MOBILITY: I Mod I S SBA CGA Min Mod Max Total  NT x2 Comments:   Bed Mobility    Rolling [] [] [] [] [] [] [x] [] [] [] []    Supine to Sit [] [] [] [] [] [] [x] [] [] [] []    Scooting [] [] [] [] [] [] [] [x] [] [] []    Sit to Supine [] [] [] [] [] [] [] [] [] [x] []    Transfers    Sit to Stand [] [] [] [] [] [] [x] [] [] [] []    Bed to Chair [] [] [] [] [] [] [x] [] [] [] []    Stand to Sit [] [] [] [] [] [] [x] [] [] [] []    I=Independent, Mod I=Modified Independent, S=Supervision, SBA=Standby Assistance, CGA=Contact Guard Assistance,   Min=Minimal Assistance, Mod=Moderate Assistance, Max=Maximal Assistance, Total=Total Assistance, NT=Not Tested  GAIT: I Mod I S SBA CGA Min Mod Max Total  NT x2 Comments:   Level of Assistance [] [] [] [] [x] [x] [] [] [] [] []    Distance 60 ft.    DME Rolling Walker and Gait Belt    Gait Quality Unsteady, small steps    Weightbearing Status N/A     I=Independent, Mod I=Modified Independent, S=Supervision, SBA=Standby Assistance, CGA=Contact Guard Assistance,   Min=Minimal Assistance, Mod=Moderate Assistance, Max=Maximal Assistance, Total=Total Assistance, NT=Not Tested    [facility] [address] [address] AM-PAC 6 Clicks   Basic Mobility Inpatient Short Form       How much difficulty does the patient currently have. .. Unable A Lot A Little None   1. Turning over in bed (including adjusting bedclothes, sheets and blankets)? [] 1   [] 2   [x] 3   [] 4   2.   Sitting down on and standing up from a chair with arms ( e.g., wheelchair, bedside commode, etc.)   [] 1   [x] 2   [] 3 [] 4   3. Moving from lying on back to sitting on the side of the bed? [] 1   [x] 2   [] 3   [] 4   How much help from another person does the patient currently need. .. Total A Lot A Little None   4. Moving to and from a bed to a chair (including a wheelchair)? [] 1   [x] 2   [] 3   [] 4   5. Need to walk in hospital room? [] 1   [] 2   [x] 3   [] 4   6. Climbing 3-5 steps with a railing? [x] 1   [] 2   [] 3   [] 4   © 2007, Trustees of [address] [place] [address] [address] [id number], under license to [employer]. All rights reserved     Score:  Initial: 13 Most Recent: X (Date: -- )    Interpretation of Tool:  Represents activities that are increasingly more difficult (i.e. Bed mobility, Transfers, Gait). PLAN:   FREQUENCY/DURATION: PT Plan of Care: 3 times/week for duration of hospital stay or until stated goals are met, whichever comes first.    PROBLEM LIST:   (Skilled intervention is medically necessary to address:)  1. Decreased ADL/Functional Activities  2. Decreased Activity Tolerance  3. Decreased Balance  4. Decreased Coordination  5. Decreased Gait Ability  6. Decreased Strength  7. Decreased Transfer Abilities   INTERVENTIONS PLANNED:   (Benefits and precautions of physical therapy have been discussed with the patient.)  1. Therapeutic Activity  2. Therapeutic Exercise/HEP  3. Neuromuscular Re-education  4. Gait Training  5. Education     TREATMENT:     EVALUATION: Moderate Complexity : (Untimed Charge)    TREATMENT:   (     )  Neuromuscular Re-education (38 Minutes): Neuromuscular Re-education included Balance Training, Coordination training, Functional mobility with facilitation, Sitting balance training, Standing balance training and Visual field awareness training to improve Balance, Coordination and Functional Mobility.     TREATMENT GRID:  N/A    AFTER TREATMENT POSITION/PRECAUTIONS:  Alarm Activated, Chair, Needs within reach, RN notified and Visitors at bedside    INTERDISCIPLINARY COLLABORATION:  RN/PCT and PT/PTA    TOTAL TREATMENT DURATION:  PT Patient Time In/Time Out  Time In: 1336  Time Out: Na Rosalinda [address], SPT

## 2021-08-12 NOTE — PROGRESS NOTES
Patient unable to be seen for OT evaluation due to being off the floor for testing. Will check back as schedule permits.    Jericho Preston, OTR/L

## 2021-08-12 NOTE — PROGRESS NOTES
ACUTE OT GOALS:  (Developed with and agreed upon by patient and/or caregiver.)  1. Patient will feed self entire meal with setup and adaptive utensils as needed using primarily RUE. 2. Patient will complete upper body dressing and bathing with setup and adaptive equipment as needed. 3. Patient will participate in E therapeutic exercises to increase strength in RUE to at least 4+/5 for participation in ADLs and functional transfers. 4. Patient will participate in [address] [place] [address] therapeutic activities to increase coordination in [facility] for bimanual fine motor ADLs. 5. Patient will complete grooming with setup and adaptive equipment as needed. .  6. Patient will complete functional transfers with supervision and adaptive equipment as needed.      Timeframe: 7 visits       OCCUPATIONAL THERAPY ASSESSMENT: Initial Assessment and Daily Note OT Treatment Day # 1    Dani Fisher is a 78 y.o. male   PRIMARY DIAGNOSIS: Acute ischemic stroke ([facility] [address]  Acute ischemic stroke ([facility] [address] [I63.9]       Reason for Referral:  CVA workup  ICD-10: Treatment Diagnosis: Generalized Muscle Weakness (M62.81)  Other lack of cordination (R27.8)  INPATIENT: Payor: [employer]IC[employer] / Plan: [address] [place] [address] HMO / Product Type: Managed Care Medicare /   ASSESSMENT:     REHAB RECOMMENDATIONS:   Recommendation to date pending progress:  Setting:   Short-term Rehab (however, pt/ wife interested in [facility])   Equipment:    To Be Determined     PRIOR LEVEL OF FUNCTION:  (Prior to Hospitalization)  INITIAL/CURRENT LEVEL OF FUNCTION:  (Based on today's evaluation)   Bathing:   Independent  Dressing:   Independent  Feeding/Grooming:   Independent  Toileting:   Independent  Functional Mobility:   Independent Bathing:   Maximal Assistance  Dressing:   Maximal Assistance  Feeding/Grooming:   Maximal Assistance  Toileting:   Moderate Assistance  Functional Mobility:   Moderate Assistance     ASSESSMENT:  Mr. Papo Sharma is an 78year old male admitted with slurred speech & RUE weakness. MRI negative for CVA. At baseline patient lives with wife and is normally independent, but has been falling a lot recently and therefore needing more help with ADLs. He is R handed. Today, presents with RUE weakness, specifically R wrist/ digit extension and impaired fine motor skills. He is functioning below his baseline due to inability to use his dominant RUE for functional tasks. Will follow for acute OT. SUBJECTIVE:   Mr. Devante Birmingham states, \"My wife is a good cook. \"    SOCIAL HISTORY/LIVING ENVIRONMENT: Lives with wife. Independent with ADLs normally. Has had 3 falls in past 2 weeks, and has needed more assistance with ADLs/ needed to use the RW since. He is R hand dominant.    Home Environment: Private residence  One/Two Story Residence: One story  Living Alone: No  Support Systems: Family member(s), Friends \ neighbors    OBJECTIVE:     PAIN: VITAL SIGNS: LINES/DRAINS:   Pre Treatment: Pain Screen  Pain Scale 1: Numeric (0 - 10)  Pain Intensity 1: 0  Post Treatment: 0   IV  O2 Device: None (Room air)     GROSS EVALUATION:  BUE Within Functional Limits Abnormal/ Functional Abnormal/ Non-Functional (see comments) Not Tested Comments:   AROM [] [] [x] [] RUE, shoulder flexion, elbow extension, wrist extension, digit extension    PROM [] [] [] [x]    Strength [] [] [x] [] R    Balance [] [x] [] []    Posture [] [] [] [x]    Sensation [] [] [x] [] Decreased R radial n distribution    Coordination [] [] [x] [] R fine motor    Tone [] [] [] [x]    Edema [] [] [] [x]    Activity Tolerance [] [x] [] []     [] [] [] []      COGNITION/  PERCEPTION: Intact Impaired   (see comments) Comments:   Orientation [x] []    Vision [] []    Hearing [] [x] [place]   Judgment/ Insight [] [x] Decreased insight into deficits    Attention [] []    Memory [] []    Command Following [] []    Emotional Regulation [x] []     [] []      ACTIVITIES OF DAILY LIVING: I Mod I S SBA CGA Min Mod Max Total NT Comments   BASIC ADLs:              Bathing/ Showering [] [] [] [] [] [] [] [] [] []    Toileting [] [] [] [] [] [] [] [] [] []    Dressing [] [] [] [] [] [] [] [] [] []    Feeding [] [] [] [] [] [] [] [] [] []    Grooming [] [] [] [] [] [x] [] [] [] [] In standing at sink using primarily  [address] [] [] [] [] [] [] [] [] [] []    Functional Mobility [] [] [] [] [] [] [x] [] [] []    I=Independent, Mod I=Modified Independent, S=Supervision, SBA=Standby Assistance, CGA=Contact Guard Assistance,   Min=Minimal Assistance, Mod=Moderate Assistance, Max=Maximal Assistance, Total=Total Assistance, NT=Not Tested    MOBILITY: I Mod I S SBA CGA Min Mod Max Total  NT x2 Comments:   Supine to sit [] [] [] [] [] [] [] [] [] [x] []    Sit to supine [] [] [] [] [] [] [] [] [] [x] []    Sit to stand [] [] [] [] [] [] [x] [] [] [] [] RW   Bed to chair [] [] [] [] [] [x] [] [] [] [] [] RW   I=Independent, Mod I=Modified Independent, S=Supervision, SBA=Standby Assistance, CGA=Contact Guard Assistance,   Min=Minimal Assistance, Mod=Moderate Assistance, Max=Maximal Assistance, Total=Total Assistance, NT=Not Tested    [employer] AM-PAC 6 Clicks   Daily Activity Inpatient Short Form        How much help from another person does the patient currently need. .. Total A Lot A Little None   1. Putting on and taking off regular lower body clothing? [] 1   [x] 2   [] 3   [] 4   2. Bathing (including washing, rinsing, drying)? [] 1   [x] 2   [] 3   [] 4   3. Toileting, which includes using toilet, bedpan or urinal?   [] 1   [x] 2   [] 3   [] 4   4. Putting on and taking off regular upper body clothing? [] 1   [x] 2   [] 3   [] 4   5. Taking care of personal grooming such as brushing teeth? [] 1   [x] 2   [] 3   [] 4   6. Eating meals? [] 1   [x] 2   [] 3   [] 4   © 2007, Trustees of [facility] [employer], under license to [employer].  All rights reserved     Score:  Initial: 12 Most Recent: X (Date: -- ) Interpretation of Tool:  Represents activities that are increasingly more difficult (i.e. Bed mobility, Transfers, Gait). PLAN:   FREQUENCY/DURATION: OT Plan of Care: 3 times/week for duration of hospital stay or until stated goals are met, whichever comes first.    PROBLEM LIST:   (Skilled intervention is medically necessary to address:)  1. Decreased ADL/Functional Activities  2. Decreased Activity Tolerance  3. Decreased AROM/PROM  4. Decreased Balance  5. Decreased Coordination  6. Decreased Gait Ability  7. Decreased Strength  8. Decreased Transfer Abilities   INTERVENTIONS PLANNED:   (Benefits and precautions of occupational therapy have been discussed with the patient.)  1. Self Care Training  2. Therapeutic Activity  3. Therapeutic Exercise/HEP  4. Neuromuscular Re-education  5. Education     TREATMENT:     EVALUATION: Low Complexity : (Untimed Charge)    TREATMENT:   ($$ Self Care/Home Management: 8-22 mins    )  Self Care (8 Minutes): Self care including Self Feeding, Grooming and fine motor tasks to increase independence and RUE fine motor skills.     TREATMENT GRID:  N/A    AFTER TREATMENT POSITION/PRECAUTIONS:  Alarm Activated, Chair, Needs within reach, RN notified and Visitors at bedside    INTERDISCIPLINARY COLLABORATION:  RN/PCT and OT/SOLOMON    TOTAL TREATMENT DURATION:  OT Patient Time In/Time Out  Time In: 1421  Time Out: [address], OTR/L

## 2021-08-12 NOTE — PROGRESS NOTES
Pt presented to ED from his PCPs due to multiple falls within 2 to 3 weeks. Wife observed that he has been getting weaker in his R hand and unable to feed himself. He also had intermittent slurred speech with word finding difficulty. Had loss of consciousness or head trauma during his falls. Reported that his gait seemed to be unsteady. In the ED, pt was noted to be hypertensive to 225/123, saturating well on room air. CM met with pt and his wife for assessment and dcp. They live in a one story home. Prior to hospitalization, pt was independent with his ADLs and did not use any DMEs. Pt does not use any home O2, however, wife stated that prior to this hospitalization the pt was getting more SOB. He is currently on RA. Denies any h/o of HH or STR. Denies h/o D/A tx. PCP and insurance verified, able to afford his medications. PT/OT/SP consulted. CM will await recommendations for dcp.    1522-PT recommending IP Rehab. CM texted NP with [facility] and placed an order to evaluate pt. Care Management Interventions  PCP Verified by CM: Yes ([facility])  Mode of Transport at Discharge:  Other (see comment) (Family)  Transition of Care Consult (CM Consult): Discharge Planning  Physical Therapy Consult: Yes  Occupational Therapy Consult: Yes  Speech Therapy Consult: Yes  Current Support Network: Lives with Spouse, Family Lives [place], Own Home  Confirm Follow Up Transport: Family  The Plan for Transition of Care is Related to the Following Treatment Goals : Return home and back to his baseline  Discharge Location  Discharge Placement: Home

## 2021-08-12 NOTE — ED NOTES
TRANSFER - OUT REPORT:    Verbal report given to Renuka Nuñez RN(name) on Dani Fisher  being transferred to [facility](unit) for routine progression of care       Report consisted of patients Situation, Background, Assessment and   Recommendations(SBAR). Information from the following report(s) SBAR, ED Summary, MAR and Dual Neuro Assessment was reviewed with the receiving nurse. Lines:       Opportunity for questions and clarification was provided.       Patient transported with:   Registered Nurse

## 2021-08-12 NOTE — PROGRESS NOTES
LTG: Patient will tolerate least restrictive diet without overt signs or symptoms of airway compromise. STG: Patient will participate in modified barium swallow study as clinically indicated. MET 8/12  STG: Patient will tolerate easy to chew diet and mildly thick(nectar thick) liquids via cup sip without overt s/sx aspiration. STG: Patient will participate in dysphagia exercises x25 each at supervision level. Added 8/12    SPEECH LANGUAGE PATHOLOGY: MODIFIED BARIUM SWALLOW STUDY  Initial Assessment    NAME/AGE/GENDER: Mercy Sandra is a 78 y.o. male  DATE: 8/12/2021  PR[facility] DIAGNOSIS: [facility] [I63.9]      ICD-10: Treatment Diagnosis: Oropharyngeal dysphagia (R13.12)  INTERDISCIPLINARY COLLABORATION: Radiologist, Dr. Ziggy Marmolejo  PRECAUTIONS/ALLERGIES: Augmentin [amoxicillin-pot clavulanate]     RECOMMENDATIONS/PLAN   DIET:    Easy to Chew   Mildly Thick Liquids (Nectar) via cup sip    MEDICATIONS: Crushed in puree     COMPENSATORY STRATEGIES/MODIFICATIONS INCLUDING:  · Cup/sip; No straws  · Reflux precautions     OTHER RECOMMENDATIONS (including follow up treatment recommendations):   · Treatment to improve/facilitate oral/pharyngeal skills   · Training in laryngeal strengthening and coordination exercises  · Training in use of compensatory safe swallowing strategies/feeding guidelines  · Patient/family education  · Follow-up MBS as deemed necessary following therapy     RECOMMENDATIONS for CONTINUED SPEECH THERAPY:   YES: Anticipate need for ongoing speech therapy during this hospitalization and at next level of care. FREQUENCY/DURATION: Continue to follow patient 3 times a week for duration of hospital stay to address above goals. ASSESSMENT   Mr. German Viera presents with mild-moderate oropharyngeal dysphagia. Oral dysphagia with reduced tongue control impacting oral containment and lingual motion/transport slowed and repetitive.  Premature spillage into pharynx to laryngeal vestibule and pyriforms with non clearing penetration and SILENT aspiration of thin liquids during the swallow with thin liquid via cup and delayed swallow initiation at pyriforms resulting in non clearing shallow penetration with mildly thick liquids via straw. There was no new penetration or aspiration with mildly thick liquids via cup, moderately thick liquids via cup and straw, pudding, and chewables. Recommend easy to chew diet and MILDLY THICK LIQUIDS via cup only. No straws. SUBJECTIVE   Upright, alert and pleasant. Some word finding difficulty. When engaging more at conversation level versus word/phrase level, able to notice volume low and difficult to understand. History of Present Injury/Illness: Mr. Saundra Smith  has a past medical history of Chronic kidney disease, Elevated PSA, Hearing difficulty, Hepatitis A (2020), Hypercholesterolemia, and Kidney stone. Sue[employer] Payment He also  has a past surgical history that includes hx colonoscopy (05/18/2016). Pain:  Pain Intensity 1: 0    Current dietary status prior to evaluation today:  NPO awaiting MBS    Previous Modified Barium Swallow studies: n/a    Current Medications:   No current facility-administered medications on file prior to encounter. Current Outpatient Medications on File Prior to Encounter   Medication Sig Dispense Refill    tamsulosin (FLOMAX) 0.4 mg capsule Take 1 Capsule by mouth daily.  finasteride (PROSCAR) 5 mg tablet Take 1 Tab by mouth daily. TAKE ONE TABLET BY MOUTH EVERY DAY 90 Tab 1    tiZANidine (ZANAFLEX) 2 mg tablet Take 1 Tablet by mouth nightly as needed for Muscle Spasm(s). (Patient not taking: Reported on 8/11/2021) 30 Tablet 0    pantoprazole (PROTONIX) 40 mg tablet Take 1 Tab by mouth daily.  (Patient not taking: Reported on 8/11/2021) 90 Tab 1       OBJECTIVE   Orientation:    Person   Place   Situation    Oral Assessment:  Labial: No impairment  Lingual: Decreased strength  Dentition: Upper Dentures and Lower Dentures  Oral Hygiene: Adequate  Vocal Quality: mildly dysphonic-reports baseline. Modified barium swallow study was performed in the radiology suite using c-arm with Mr. Jimenez in the lateral plane upright 90° in a stretcher. To evaluate his swallow function, barium coated liquid and food was administered in the form of thin liquids (by spoon and cup sip), mildly-thick liquids (by spoon, cup sip, cup gulp and straw sip), moderately-thick liquids (by cup gulp and straw sip), pudding, mixed consistency and cracker. Oral phase of swallow:    reduced bolus control   slow oral transit/reduced posterior propulsion skills     reduced oral containment with premature spillage to pharynx pre-swallow   reduced posterior tongue elevation   bolus residual on tongue    Adequate mastication     Pharyngeal phase of swallow:    Swallows of thin liquids-  o Thin via tsp:premature spillage to pyriforms with no penetration/aspiration. o Thin via cup: ~1/2 of bolus spills to pyriforms and into laryngeal vestibule with shallow non clearing penetration during the swallow and aspiration during the swallow of second portion of bolus. o No significant pharyngeal residue.  Swallows of mildly-thick liquids-  o  Mildly thick via tsp: initiated at posterior epiglottis. No penetration/aspiration. o Mildly thick via cup: delayed initiation with bolus head at pyriforms and single episode of flash shallow penetration. Otherwise no penetration/aspiration on other trials via cup. Mild oral residue that patient clears with independent double swallow.   o Mildly thick via straw: premature spillage of 1/2 bolus to valleculae with swallow initiation at pyriforms and non clearing shallow penetration during the swallow.  Swallows of moderately-thick liquids-  o Moderately thick via cup: delayed initiation with bolus head at level of pyriforms but no premature spillage or pooling. No penetration/aspiration.    o Moderately thick via straw: premature spillage with initiation at valleculae. No penetration/aspiration. Mild oral residue on lingual surface that patient clears with subsequent swallow.  Swallows of pudding were timely. No penetration/aspiration.  Swallows of mixed consistencies were timely with premature spillage to liquid to valleculae. No laryngeal penetration/ aspiration. No significant pharyngeal residue.  Swallows of cracker were timely. No laryngeal penetration or aspiration was observed. there was mild valleculae residue that clears with liquid rinse. Pharyngeal characteristics:   delayed pharyngeal swallow initiation   reduced retraction of base of tongue   adequate hyolaryngeal elevation/excursion   adequate epiglottic inversion   adequate constriction of posterior pharyngeal wall   delayed laryngeal closure    Aspiration/Penetration Scale: 8 (Silent aspiration. Contrast passes below the folds/glottis with no effort to eject.)    Cervical esophageal phase of swallow:    adequate and timely clearance of all boluses through cervical esophagus  **Distal esophagus not assessed due to limitations of MBS study. Assessment/Reassessment only, no treatment provided today.       Tool Used: Dysphagia Outcome and Severity Scale (ANITA)    Comments   Normal Diet 7 With no strategies or extra time needed   Functional Swallow 6 May have mild oral or pharyngeal delay   Mild Dysphagia 5 Which may require one diet consistency restricted    Mild-Moderate Dysphagia 4 With 1-2 diet consistencies restricted   Moderate Dysphagia 3 With 2 or more diet consistencies restricted   Moderately Severe Dysphagia 2 With partial PO strategies (trials with ST only)   Severe Dysphagia 1 With inability to tolerate any PO safely      Score:  Initial: 4 Most Recent: x (Date:8/12/2021)   Interpretation of Tool: The Dysphagia Outcome and Severity Scale (ANITA) is a simple, easy-to-use, 7-point scale developed to systematically rate the functional severity of dysphagia based on objective assessment and make recommendations for diet level, independence level, and type of nutrition. Payor: Sagar Hou / Plan: [address] [place] [address] HMO / Product Type: Managed Care Medicare /     Education:  · Recommendations discussed with patient, patient's wife and RN. · Followed up with patient and his wife after study in room- education, samples, ad Handout on recommendations provided to patient/family    Safety:   After treatment position/precautions:  · Patient waiting in radiology holding bay for transport back to room.     Total Treatment Duration:  Time In: 1030   Time Out: [address] [facility], [facility] JESSICA SADE GUERRA, CCC-SLP

## 2021-08-13 NOTE — PROGRESS NOTES
[facility]ist Progress Note    Patient: Raymundo Bailon MRN: [id number]  SSN: [id number]    YOB: 1940  Age: 78 y.o. Sex: male      Admit Date: 8/11/2021    LOS: 1 day     Subjective:     Chief Complaint: Right arm weakness, ataxia, and multiple falls x 3 weeks  Reason for Admission: Hypertensive emergency    78 y.o. male with medical history of BPH, CKD stage III who presented to ED his PCPs office due to multiple falls within 2 to 3 weeks. Per wife at bed side patient had fallen about 3 times since 2 weeks ago with last fall yesterday evening. Reports she has noticed that he has been getting weaker on his right hand and unable to feed himself he normally would. He also have intermittent slurred speech with word finding difficulty. Had loss of consciousness or head trauma during his falls. Reports that his gait seemed to be unsteady. He is hard of hearing but appears to be alert and oriented x3. Reports that he is in normal state of health without any fever, chills, shortness of breath, chest pain, abdominal pain, nausea, vomiting except for recent multiple falls. In the ED, patient is noted to be hypertensive to 225/123, saturating well on room air. Laboratory work up is unremarkable. T head without acute intracranial abnormalities. Chest x-ray with no acute cardiopulmonary disease but showed right upper lobe nodule which appeared to be present on prior PET scan. 8/13 - He feels good today. Had a fall overnight when he got up by himself. Still with right arm weakness and not able to walk straight. Denies HA. Denies CP/SOB. Denies neck pain. Review of systems negative except stated above.     Assessment:     Primary Diagnosis: Hypertensive emergency    Hospital Problems as of 8/13/2021 Date Reviewed: 6/30/2021        Codes Class Noted - Resolved POA    * (Principal) Hypertensive emergency ICD-10-CM: I16.1  ICD-9-CM: 401.9  8/11/2021 - Present Yes        Ataxia ICD-10-CM: R27.0  ICD-9-CM: 781.3  8/12/2021 - Present Yes        Right arm weakness ICD-10-CM: R29.898  ICD-9-CM: 729.89  8/11/2021 - Present Yes        Multiple falls ICD-10-CM: R29.6  ICD-9-CM: V15.88  8/12/2021 - Present Yes        Essential hypertension ICD-10-CM: I10  ICD-9-CM: 401.9  6/6/2020 - Present Yes        CKD (chronic kidney disease) stage 3, GFR 30-59 ml/min (Former[facility]) ICD-10-CM: N18.30  ICD-9-CM: 585.3  12/2/2019 - Present Yes        Benign prostatic hyperplasia with nocturia ICD-10-CM: N40.1, R35.1  ICD-9-CM: 600.01, 788.43  1/23/2020 - Present Yes        Bilateral hearing loss ICD-10-CM: H91.93  ICD-9-CM: 389.9  12/2/2019 - Present Yes              Active Medical Issues and Plan (MDM):     Principal Problem:    Hypertensive emergency (8/11/2021)  - Per patient and wife, this was due to patient being extremely agitated at being admitted  - SBP now 126 with no meds  - Stroke work up unremarkable  - Continue to monitor    Active Problems:    Right arm weakness (8/11/2021)  - Unsure etiology  - Has objective weakness  - ? Cervical --> check MRI Brain  - Stroke work up negative  - Appreciate Neurology's assistance      Ataxia (8/12/2021)  - Unsure etiology  - Nose-to-finger normal  - No cerebellar issues on MRI  - PT/OT recommended SNF  - Appreciate Neurology's assistance      Multiple falls (8/12/2021)  - PT/OT      CKD (chronic kidney disease) stage 3, GFR 30-59 ml/min ([facility]) (12/2/2019)  - Stable      Essential hypertension (6/6/2020)  - BP now normal  - No meds given      Otherwise all chronic medical issues appear stable with no changes. Diet: ADULT DIET Easy to Chew; Mildly Thick (Nectar);  No Drinking Straws  VTE ppx: Lovenox    Objective:     Visit Vitals  BP (!) 145/72 (BP 1 Location: Left upper arm, BP Patient Position: Supine)   Pulse (!) 104   Temp 97.7 °F (36.5 °C)   Resp 19   Ht 6' (1.829 m)   Wt 89.8 kg (198 lb)   SpO2 94%   BMI 26.85 kg/m²      Oxygen Therapy  O2 Sat (%): 94 % (08/13/21 1200)  Pulse via Oximetry: 66 beats per minute (08/11/21 1909)  O2 Device: None (Room air) (08/11/21 2031)      Intake and Output:     Intake/Output Summary (Last 24 hours) at 8/13/2021 1507  Last data filed at 8/13/2021 1210  Gross per 24 hour   Intake 364 ml   Output 200 ml   Net 164 ml         Physical Exam:   GENERAL: alert, cooperative, no distress, appears stated age  EYE: conjunctivae/corneas clear. PERRL. THROAT & NECK: normal and no erythema or exudates noted. LUNG: clear to auscultation bilaterally  HEART: regular rate and rhythm, S1S2, no murmur, no JVD  ABDOMEN: soft, non-tender, non-distended. Bowel sounds normal.   EXTREMITIES:  No edema, 2+ pedal/radial pulses bilaterally  SKIN: no rash or abnormalities  NEUROLOGIC: A&Ox3. Cranial nerves 2-12 grossly intact. RUE 4/5 strength. Lab/Data Review:  Recent Results (from the past 24 hour(s))   PLEASE READ & DOCUMENT PPD TEST IN 24 HRS    Collection Time: 08/12/21  9:17 PM   Result Value Ref Range    PPD Negative Negative    mm Induration 0 0 - 5 mm       SARS-CoV-2 Lab Results  \"Novel Coronavirus\" Test: No results found for: COV2NT   \"Emergent Disease\" Test: No results found for: EDPR  \"SARS-COV-2\" Test: No results found for: XGCOVT  \"[facility]\" Test: No results found for: RSLT  Rapid Test: No results found for: COVR        Imaging:  XR CHEST PA LAT    Result Date: 8/11/2021  EXAMINATION: CHEST RADIOGRAPH 8/11/2021 4:57 PM ACCESSION NUMBER: [id number] INDICATION: chest pain COMPARISON: X-rays 8/1/2021, PET CT 12/26/2019 TECHNIQUE: PA  and lateral views of the chest were obtained. FINDINGS: Cardiac Silhouette: Within normal limits in size. Lungs: No focal airspace disease. There is likely a correlate on the lateral view from the right upper lobe nodule demonstrated on the prior PET CT. Pleura: No pleural effusion. No pneumothorax. Osseous Structures: Thoracic spine spondylosis. Upper Abdomen: Unremarkable. 1. No evidence of acute cardiopulmonary disease.  2. Likely round correlate on the lateral view for the right upper lobe nodule demonstrated on the prior PET CT. Please see the prior PET CT for management recommendations. VOICE DICTATED BY: Dr. Thuan Richard    XR RIBS RT UNI 2 V    Result Date: 8/1/2021  EXAMINATION: XR RIBS RT UNI 2 V  8/1/2021 9:06 AM COMPARISON: None available INDICATION: Fall with right rib pain TECHNIQUE: 3 views of the right ribs were obtained FINDINGS: No fractures or other acute bony abnormalities are seen. The lungs are free from infiltrate or nodule. The surrounding soft tissues are preserved. 1.  No acute abnormality. XR SPINE LUMB 2 OR 3 V    Result Date: 8/1/2021  EXAMINATION: XR SPINE LUMB 2 OR 3 V 8/1/2021 9:06 AM ACCESSION NUMBER: [id number] COMPARISON: None available INDICATION: Fall with right lower back pain TECHNIQUE: 3 views of the lumbar spine were obtained. FINDINGS: Vertebral body alignment: There is mild S-shaped curvature of the lumbar spine. There is no vertebral anomaly. Vertebral body heights: Preserved Degenerative changes: There are mild multilevel degenerative changes with disc space narrowing, sclerosis, and small anterior osteophytes. Post surgical changes: None Included abdominal soft tissues: Calcifications are seen in the aorta and it's major branches. 1. No acute abnormality 2. Multilevel degenerative changes 3. Atherosclerosis     XR SWALLOW FUNC VIDEO    Result Date: 8/12/2021  Modified Barium Swallow INDICATION: Dysphagia, CVA Fluoro time: 2.31 minutes Spot films:  0 Fluoroscopy was used to evaluate pharyngeal function while the patient was given barium solutions and barium coated solids. FINDINGS: Silent aspiration during swallow with thin barium by cup. Shallow penetration with mildly thick barium by straw. No penetration or aspiration with remaining administered consistencies. Aspiration and penetration, as above.  Please see concurrent speech and language pathology report for a complete description of findings. MRI BRAIN WO CONT    Result Date: 8/12/2021  Exam: MRI BRAIN WO CONT on 8/12/2021 10:22 AM Clinical History: The Male patient is 78years old presenting for poss cva, with weakness-No trauma-No surgery-No cancer-No prior. Comparison:  Head CT 8/11/2021 Technique:  Axial T2, axial FLAIR, axial diffusion-weighted,  sagittal T1 and coronal gradient-echo scans were performed. Findings: Age-related senescent changes are seen with sulcal and ventricular prominence. There is mild chronic confluent periventricular white matter disease with scattered lacunae throughout the corona radiata and centrum semiovale. No evidence of restricted diffusion seen to suggest acute ischemia. There are no abnormal extra-axial fluid collections. No evidence of mass or mass effect is seen. There is no diffusion signal abnormality. Expected flow voids are maintained in the major intracranial vessels. Cerebellar involutional changes are seen. Visualized brainstem structures are unremarkable. There is no evidence of Chiari malformation. The ventricular system and CSF containing spaces are proportionate to the degree of cerebral involution. . Visualized extracranial soft tissues are unremarkable. The paranasal sinuses are well pneumatized and aerated. 1.  Age-related senescent changes and chronic microvascular disease without acute intracranial abnormality. CPT code(s) [id number]     CT HEAD WO CONT    Result Date: 8/11/2021  EXAMINATION: HEAD CT WITHOUT CONTRAST 8/11/2021 3:57 PM ACCESSION NUMBER: [id number] INDICATION: weakness, falls COMPARISON: None available TECHNIQUE: Multiple-row detector helical CT examination of the head without intravenous contrast. Radiation dose reduction techniques were used for this study:  Our CT scanners use one or all of the following: Automated exposure control, adjustment of the mA and/or kVp according to patient's size, iterative reconstruction.  FINDINGS: The ventricles are within normal limits for the degree of global brain parenchymal volume loss. There is no midline shift. The basilar cisterns are patent. There is no cerebellar tonsillar ectopia or herniation. Hypodensities in the periventricular and subcortical white matter are nonspecific, but they are most likely to represent chronic microangiopathy. There is no evidence of acute intracranial hemorrhage or extra-axial collections. There is no CT evidence of acute infarction. No evidence of skull fracture. The paranasal sinuses and mastoid air cells are well aerated and clear. No acute intracranial abnormality. VOICE DICTATED BY: Dr. Jodie Howard CONT    Result Date: 8/12/2021  Title:  CT arteriogram of the neck and head. Indication: Right arm weakness. Ataxia. Stroke like symptoms. . Technique: Axial images of the neck and head were obtained after the uneventful administration of intravenous iodinated contrast media. Contrast was used to best identify the arterial structures. Images were reviewed on a separate, free standing, three-dimensional workstation as per the referring physicians request.  All stenosis percentages derived by comparing the narrowest segment with the distal Internal Carotid Artery luminal diameter, as described in the [place] American Symptomatic Carotid Endarterectomy Trial (NASCET) criteria. All CT scans at this facility are performed using dose reduction/dose modulation techniques, as appropriate the performed exam, including the following: Automated Exposure Control; Adjustment of the mA and/or kV according to patient size (this includes techniques or standardized protocols for targeted exams where dose is matched to indication/reason for exam); and Use of Iterative Reconstruction Technique. Comparison: None. Findings: Lungs:  No focal consolidation or pleural effusions. No suspicious pulmonary nodules. .  Bones:  No osseous destruction.  Moderate multilevel degenerative changes in the cervical spine. This most pronounced at C3-C4 where there is a prominent posterior disc osteophyte complex which appears to cause spinal canal narrowing. Paranasal sinuses:  Paranasal sinuses are clear. .  Brain:  No midline shift. No enhancing mass lesion or hydrocephalus. .  Soft tissues:  Within normal limits. .  Dural venous sinuses:  Patent. Aortic arch:  Non-aneurysmal. Arch vessels are patent. Moderate atherosclerotic changes are present. ANTERIOR CIRCULATION Right common carotid artery:  No significant stenosis or occlusion. Right internal carotid artery:  No significant stenosis or occlusion. Moderate atherosclerotic changes along the right carotid bulb without significant stenosis. Vascular calcifications are present along the carotid siphon. Right middle cerebral artery:  No significant stenosis, occlusion, or aneurysm. Right anterior cerebral artery:  No significant stenosis, occlusion, or aneurysm. Left common carotid artery: No significant stenosis or occlusion. Left internal carotid artery:  No significant stenosis or occlusion. Mild to moderate atherosclerotic changes of the left carotid bulb without significant stenosis. As the calcifications are present along the carotid siphon. Left middle cerebral artery:  No significant stenosis, occlusion, or aneurysm. Left anterior cerebral artery:  No significant stenosis, occlusion, or aneurysm. Anterior communicating artery: No significant stenosis, occlusion, or aneurysm. POSTERIOR CIRCULATION Right vertebral artery:  No significant stenosis or occlusion. Left vertebral artery:  No significant stenosis or occlusion. Basilar artery:  No significant stenosis, occlusion, or aneurysm. Right posterior communicating artery: No significant stenosis, occlusion, or aneurysm. Left posterior communicating artery:  No significant stenosis, occlusion, or aneurysm. Right posterior cerebral artery:  No significant stenosis, occlusion, or aneurysm.  Fetal type right PCA. Left posterior cerebral artery:  No significant stenosis, occlusion, or aneurysm. No acute large vessel occlusion or high-grade stenosis. Moderate atherosclerotic changes. ECHO ADULT COMPLETE    Result Date: 8/12/2021  · LV: Estimated LVEF is 55 - 60%. Normal cavity size, wall thickness and systolic function (ejection fraction normal). Age-appropriate left ventricular diastolic function. · Right Ventricle: Normal cavity size and global systolic function. · AV: Moderate aortic valve sclerosis. Aortic valve leaflet calcification present. Mild aortic valve regurgitation is present. · PV: Mild pulmonic valve regurgitation is present. · TV: Mild tricuspid valve regurgitation is present. · MV: Mild mitral valve regurgitation is present. No results found for this visit on 08/11/21. Cultures: All Micro Results     None          Discharge Plan:     Home in 24-48 hours. Patient and wife refusing SNF despite PT/OT's recs.     Signed By: Miranda Garland DO     August 13, 2021

## 2021-08-13 NOTE — PROGRESS NOTES
Utilization Review Physician has recommended this patient is most appropriate as Outpatient Status receiving Observation Services. The Attending provider agreed and an outpatient order was placed on the chart as the Attending Provider requested. The patient will be provided the documentation for a Condition Code 44, conversion from Inpatient to Outpatient Status, and questions answered. The MOON letter explaining the outpatient/observation services was given to patient's spouse with verbal explanation and verbal understanding was received about information given. Letter signed by patient's spouse with date and time. Signed copy placed in the patient's chart for scanning by HIS and copy left at bedside for patient information. CM notified UR JOHN Landrum, [id number] [facility],  supervisor Tessa Rock.

## 2021-08-13 NOTE — PROGRESS NOTES
Problem: Falls - Risk of  Goal: *Absence of Falls  Description: Document Eva To Fall Risk and appropriate interventions in the flowsheet.   Outcome: Progressing Towards Goal  Note: Fall Risk Interventions:  Mobility Interventions: Communicate number of staff needed for ambulation/transfer, Bed/chair exit alarm, Assess mobility with egress test    Mentation Interventions: Door open when patient unattended, Bed/chair exit alarm, Adequate sleep, hydration, pain control    Medication Interventions: Patient to call before getting OOB, Bed/chair exit alarm, Teach patient to arise slowly    Elimination Interventions: Stay With Me (per policy), Patient to call for help with toileting needs, Elevated toilet seat, Call light in reach, Bed/chair exit alarm, Urinal in reach, Toilet paper/wipes in reach    History of Falls Interventions: Door open when patient unattended, Consult care management for discharge planning, Evaluate medications/consider consulting pharmacy, Investigate reason for fall         Problem: Patient Education: Go to Patient Education Activity  Goal: Patient/Family Education  Outcome: Progressing Towards Goal

## 2021-08-13 NOTE — PROGRESS NOTES
This RN was assisting pt to restroom when pt became weak and started to lean heavily. This RN and other staff members assisted pt to floor. Pt denies pain and injury. No signs or sx of acute distress. Vitals WDL. No new abrasions to skin noted. Pt did not hit head. House supervisor notified. Dr. Marlin Macias, hospitalist notified. Will notify family at end of shift.

## 2021-08-13 NOTE — PROGRESS NOTES
SPEECH PATHOLOGY NOTE:    Attempted to see patient for dysphagia management, however he declined intake. Reports just ate some lunch and full. Reviewed recommendations. Will check back at later date.        Cyn Hardy, [facility] JESSICA GUERRA, CCC-SLP

## 2021-08-13 NOTE — CONSULTS
Basilio Matta MD  Medical Director  [address] [place] [address], [address] [place] [address]  Tel: [phone]        Physical Medicine & Rehab Consult     Admit Date: 8/11/2021  Referring Provider: Dr Erlinda Corrales / Plan / Recommendations: Medical chart reviewed. Dustin Cain is a 78 y.o.  male whom we have been requested to evaluate for consideration for inpatient rehabilitation s/p TIA. Consult request acknowledged; Although he continues to have trouble with balance and falls, he declines acute rehab at this time and is planning to dc home today or tomorrow. We will follow and discuss patient with rehab admissions coordinators. Thank you for the opportunity to participate in the care of this patient.       Luli Delgadillo MD  __________________________________________________________

## 2021-08-13 NOTE — PROGRESS NOTES
Pt still with R arm weakness and not able to walk straight. Pt and wife are refusing IPR or STR and want only HH at discharge. CM spoke to the pt and wife and continue to endorse, pt reported that he wanted to go home. CM will send referral for [facility] O[facility] for RN/PT/OT and SP. Pt may d/c later today or tomorrow. Care Management Interventions  PCP Verified by CM: Yes ([facility])  Mode of Transport at Discharge:  Other (see comment)  Transition of Care Consult (CM Consult): Discharge Planning  Discharge Durable Medical Equipment: No  Physical Therapy Consult: Yes  Occupational Therapy Consult: Yes  Speech Therapy Consult: Yes  Current Support Network: Lives with Spouse, Family Lives [facility], Own Home  Confirm Follow Up Transport: Family  The Plan for Transition of Care is Related to the Following Treatment Goals : Return home and back to his baseline  Discharge Location  Discharge Placement: Home with home health

## 2021-08-13 NOTE — CONSULTS
Consult    Patient: Dani Fisher MRN: [id number]  SSN: [id number]    YOB: 1940  Age: 78 y.o. Sex: male        Assessment:     1. Right arm weakness and unsteady gait rule out cervical radicuomyelopathyl  Hospital Problems  Date Reviewed: 6/30/2021        Codes Class Noted POA    Multiple falls ICD-10-CM: R29.6  ICD-9-CM: V15.88  8/12/2021 Yes        Ataxia ICD-10-CM: R27.0  ICD-9-CM: 781.3  8/12/2021 Yes        Right arm weakness ICD-10-CM: R29.898  ICD-9-CM: 729.89  8/11/2021 Yes        * (Principal) Hypertensive emergency ICD-10-CM: I16.1  ICD-9-CM: 401.9  8/11/2021 Yes        Essential hypertension ICD-10-CM: I10  ICD-9-CM: 401.9  6/6/2020 Yes        Benign prostatic hyperplasia with nocturia ICD-10-CM: N40.1, R35.1  ICD-9-CM: 600.01, 788.43  1/23/2020 Yes        CKD (chronic kidney disease) stage 3, GFR 30-59 ml/min ([facility]) ICD-10-CM: N18.30  ICD-9-CM: 585.3  12/2/2019 Yes        Bilateral hearing loss ICD-10-CM: H91.93  ICD-9-CM: 389.9  12/2/2019 Yes              Plan:     MRI cervical spine    B12 level    Subjective: Dani Fisher is a 78 y.o. male who is being seen for right arm weakness and unsteady gait. Patient has progressive unsteadiness over 2 to 3 weeks. . Denies neck pain.   On exam asleep and some type of  Past Medical History:   Diagnosis Date    Chronic kidney disease     Elevated PSA     Hearing difficulty     Hepatitis A 2020    Hypercholesterolemia     Kidney stone      Past Surgical History:   Procedure Laterality Date    HX COLONOSCOPY  05/18/2016      Family History   Problem Relation Age of Onset    No Known Problems Mother     Cancer Father     No Known Problems Maternal Grandmother     No Known Problems Maternal Grandfather     No Known Problems Paternal Grandmother     No Known Problems Paternal Grandfather      Social History     Tobacco Use    Smoking status: Never Smoker    Smokeless tobacco: Never Used   Substance Use Topics    Alcohol use: No     Alcohol/week: 0.0 standard drinks      Current Facility-Administered Medications   Medication Dose Route Frequency Provider Last Rate Last Admin    oxyCODONE IR (ROXICODONE) tablet 5 mg  5 mg Oral Q4H PRN Dee Lira E, DO   5 mg at 08/12/21 1527    tamsulosin (FLOMAX) capsule 0.4 mg  0.4 mg Oral DAILY Dee Lira E, DO   0.4 mg at 08/13/21 2082    finasteride (PROSCAR) tablet 5 mg  5 mg Oral DAILY Dee Lira E, DO   5 mg at 08/13/21 0839    sodium chloride (NS) flush 5-10 mL  5-10 mL IntraVENous Q8H Ivan Braun R,    10 mL at 08/13/21 1414    sodium chloride (NS) flush 5-10 mL  5-10 mL IntraVENous PRN María Braun R, DO        sodium chloride (NS) flush 5-40 mL  5-40 mL IntraVENous Q8H Katherin Dunbar MD   10 mL at 08/13/21 1415    sodium chloride (NS) flush 5-40 mL  5-40 mL IntraVENous PRN Katherin Dunbar MD        ondansetron (ZOFRAN) injection 4 mg  4 mg IntraVENous Q4H PRN Katherin Dunbar MD        aspirin chewable tablet 81 mg  81 mg Oral DAILY Katherin Dunbar MD   81 mg at 08/13/21 2341    atorvastatin (LIPITOR) tablet 40 mg  40 mg Oral QHS Katherin Dunbar MD   40 mg at 08/11/21 2143    acetaminophen (TYLENOL) tablet 650 mg  650 mg Oral Q4H PRN Katherin Dunbar MD   650 mg at 08/13/21 1420    acetaminophen (TYLENOL) suppository 650 mg  650 mg Rectal Q4H PRN Katherin Dunbar MD        bisacodyL (DULCOLAX) suppository 10 mg  10 mg Rectal DAILY PRN Katherin Dunbar MD        famotidine (PEPCID) tablet 20 mg  20 mg Oral BID PRN Katherin Dunbar MD        enoxaparin (LOVENOX) injection 40 mg  40 mg SubCUTAneous Q24H Katherin Dunbar MD   40 mg at 08/11/21 2143    hydrALAZINE (APRESOLINE) tablet 25 mg  25 mg Oral QID PRN Katherin Dunbar MD   25 mg at 08/13/21 5918        Allergies   Allergen Reactions    Augmentin [Amoxicillin-Pot Clavulanate] Nausea and Vomiting       Review of Systems: Review of Systems - History obtained from mother and spouse, chart review and the patient    Review of Systems - General ROS: negative  Psychological ROS: negative  Ophthalmic ROS: negative  ENT ROS: negative  Allergy and Immunology ROS: negative  Hematological and Lymphatic ROS: positive for - bleeding problems  Endocrine ROS: negative  Respiratory ROS: no cough, shortness of breath, or wheezing  Cardiovascular ROS: no chest pain or dyspnea on exertion  Gastrointestinal ROS: no abdominal pain, change in bowel habits, or black or bloody stools  Genito-Urinary ROS: no dysuria, trouble voiding, or hematuria  Musculoskeletal ROS: negative  Neurological ROS: no TIA or stroke symptoms  Dermatological ROS: negative          Objective:     Vitals:    08/13/21 0238 08/13/21 0400 08/13/21 0800 08/13/21 1200   BP: (!) 184/84 (!) 183/85 (!) 159/94 (!) 145/72   Pulse: 78 83 82 (!) 104   Resp: 14 15 18 19   Temp: 98 °F (36.7 °C) 98.4 °F (36.9 °C) 97.7 °F (36.5 °C) 97.7 °F (36.5 °C)   SpO2: 94% 92% 91% 94%   Weight:       Height:            Physical Exam:      General: well nourished, appears stated age    Eyes: no proptosis or exophthalmos; conjunctivae clear, sclerae non-icteric    Chest: clear to auscultation    Cardiac: normal S1 S2; no murmurs gallop or rubs    Neurological:    MSE: alert, oriented times 3; fluent speech; no paraphasic errors; follows commands without difficulty    CN 2: visual fields full; no afferent pupillary defect; VA not checked; fundoscopic exam ... CN 3,4,6: Pupils symmetrical in size, reactive to light directly and consensually; no ptosis; full versions and ductions  CN 5: facial sensation intact to light touch and pin prick. Corneal reflex . ..   CN 7: Symmetrical facial tone and movements  CN 8 responds to spoken voice [place]  CN 9,10; palate symmetrical gag intact  CN 11: head turn and shoulder shrug intact  CN 12: tongue midline without atrophy or fasiculations    Motor:  Power 5/5 UE and LE proximal to distal  Fine motor movements symmetrical  Tone normal  Atrophy: absent    Cerebellar:  Finger to nose; heel to shin intact      Sensory  Romberg negative  Intact to primary modalities in all 4 extremities    Reflexes    Symmetrical and normally active at 2+ in UE and LE  Plantar response flexor bilaterally    Recent Results (from the past 24 hour(s))   PLEASE READ & DOCUMENT PPD TEST IN 24 HRS    Collection Time: 08/12/21  9:17 PM   Result Value Ref Range    PPD Negative Negative    mm Induration 0 0 - 5 mm       Lab Results   Component Value Date/Time    Cholesterol, total 131 08/12/2021 05:31 AM    HDL Cholesterol 32 (L) 08/12/2021 05:31 AM    LDL, calculated 81.4 08/12/2021 05:31 AM    VLDL, calculated 17.6 08/12/2021 05:31 AM    Triglyceride 88 08/12/2021 05:31 AM    CHOL/HDL Ratio 4.1 08/12/2021 05:31 AM        Lab Results   Component Value Date/Time    Hemoglobin A1c 5.6 08/12/2021 05:31 AM        CT Results (most recent):  Results from [place] encounter on 08/11/21    CTA HEAD NECK W WO CONT    Narrative  Title:  CT arteriogram of the neck and head. Indication: Right arm weakness. Ataxia. Stroke like symptoms. .    Technique: Axial images of the neck and head were obtained after the uneventful  administration of intravenous iodinated contrast media. Contrast was used to  best identify the arterial structures. Images were reviewed on a separate, free  standing, three-dimensional workstation as per the referring physicians request.      All stenosis percentages derived by comparing the narrowest segment with the  distal Internal Carotid Artery luminal diameter, as described in the [place]  American Symptomatic Carotid Endarterectomy Trial (NASCET) criteria. All CT scans at this facility are performed using dose reduction/dose modulation  techniques, as appropriate the performed exam, including the following:  Automated Exposure Control;  Adjustment of the mA and/or kV according to patient  size (this includes techniques or standardized protocols for targeted exams  where dose is matched to indication/reason for exam); and Use of Iterative  Reconstruction Technique. Comparison: None. Findings:    Lungs:  No focal consolidation or pleural effusions. No suspicious pulmonary  nodules. .  Bones:  No osseous destruction. Moderate multilevel degenerative changes in the  cervical spine. This most pronounced at C3-C4 where there is a prominent  posterior disc osteophyte complex which appears to cause spinal canal narrowing. Paranasal sinuses:  Paranasal sinuses are clear. .  Brain:  No midline shift. No enhancing mass lesion or hydrocephalus. .  Soft tissues:  Within normal limits. .    Dural venous sinuses:  Patent. Aortic arch:  Non-aneurysmal. Arch vessels are patent. Moderate atherosclerotic  changes are present. ANTERIOR CIRCULATION    Right common carotid artery:  No significant stenosis or occlusion. Right internal carotid artery:  No significant stenosis or occlusion. Moderate  atherosclerotic changes along the right carotid bulb without significant  stenosis. Vascular calcifications are present along the carotid siphon. Right middle cerebral artery:  No significant stenosis, occlusion, or aneurysm. Right anterior cerebral artery:  No significant stenosis, occlusion, or  aneurysm. Left common carotid artery: No significant stenosis or occlusion. Left internal carotid artery:  No significant stenosis or occlusion. Mild to  moderate atherosclerotic changes of the left carotid bulb without significant  stenosis. As the calcifications are present along the carotid siphon. Left middle cerebral artery:  No significant stenosis, occlusion, or aneurysm. Left anterior cerebral artery:  No significant stenosis, occlusion, or aneurysm. Anterior communicating artery: No significant stenosis, occlusion, or aneurysm. POSTERIOR CIRCULATION    Right vertebral artery:  No significant stenosis or occlusion.   Left vertebral artery:  No significant stenosis or occlusion. Basilar artery:  No significant stenosis, occlusion, or aneurysm. Right posterior communicating artery: No significant stenosis, occlusion, or  aneurysm. Left posterior communicating artery:  No significant stenosis, occlusion, or  aneurysm. Right posterior cerebral artery:  No significant stenosis, occlusion, or  aneurysm. Fetal type right PCA. Left posterior cerebral artery:  No significant stenosis, occlusion, or  aneurysm. Impression  No acute large vessel occlusion or high-grade stenosis. Moderate  atherosclerotic changes. Results for orders placed or performed during the hospital encounter of 08/11/21   EKG, 12 LEAD, INITIAL   Result Value Ref Range    Ventricular Rate 67 BPM    Atrial Rate 67 BPM    P-R Interval 174 ms    QRS Duration 74 ms    Q-T Interval 388 ms    QTC Calculation (Bezet) 409 ms    Calculated P Axis 53 degrees    Calculated R Axis 80 degrees    Calculated T Axis 65 degrees    Diagnosis       Normal sinus rhythm  Low voltage QRS  Cannot rule out Septal infarct , age undetermined  Abnormal ECG  No previous ECGs available  Confirmed by Ihsan Rock ([id number]) on 8/11/2021 5:09:49 PM          MRI Results (most recent):  Results from [place] encounter on 08/11/21    MRI BRAIN WO CONT    Narrative  Exam: MRI BRAIN WO CONT on 8/12/2021 10:22 AM    Clinical History: The Male patient is 78years old presenting for poss cva, with  weakness-No trauma-No surgery-No cancer-No prior. Comparison:  Head CT 8/11/2021    Technique:  Axial T2, axial FLAIR, axial diffusion-weighted,  sagittal T1 and  coronal gradient-echo scans were performed. Findings:    Age-related senescent changes are seen with sulcal and ventricular prominence. There is mild chronic confluent periventricular white matter disease with  scattered lacunae throughout the corona radiata and centrum semiovale. No  evidence of restricted diffusion seen to suggest acute ischemia.     There are no abnormal extra-axial fluid collections. No evidence of mass or mass  effect is seen. There is no diffusion signal abnormality. Expected flow voids  are maintained in the major intracranial vessels. Cerebellar involutional changes are seen. Visualized brainstem structures are  unremarkable. There is no evidence of Chiari malformation. The ventricular system and CSF containing spaces are proportionate to the degree  of cerebral involution. .    Visualized extracranial soft tissues are unremarkable. The paranasal sinuses are well pneumatized and aerated. Impression  1. Age-related senescent changes and chronic microvascular disease without  acute intracranial abnormality. CPT code(s) [id number]       US Results (most recent):  No results found for this or any previous visit. Most recent CTA  Results from [place] encounter on 08/11/21    CTA HEAD NECK W WO CONT    Narrative  Title:  CT arteriogram of the neck and head. Indication: Right arm weakness. Ataxia. Stroke like symptoms. .    Technique: Axial images of the neck and head were obtained after the uneventful  administration of intravenous iodinated contrast media. Contrast was used to  best identify the arterial structures. Images were reviewed on a separate, free  standing, three-dimensional workstation as per the referring physicians request.      All stenosis percentages derived by comparing the narrowest segment with the  distal Internal Carotid Artery luminal diameter, as described in the [place]  American Symptomatic Carotid Endarterectomy Trial (NASCET) criteria. All CT scans at this facility are performed using dose reduction/dose modulation  techniques, as appropriate the performed exam, including the following:  Automated Exposure Control;  Adjustment of the mA and/or kV according to patient  size (this includes techniques or standardized protocols for targeted exams  where dose is matched to indication/reason for exam); and Use of Iterative  Reconstruction Technique. Comparison: None. Findings:    Lungs:  No focal consolidation or pleural effusions. No suspicious pulmonary  nodules. .  Bones:  No osseous destruction. Moderate multilevel degenerative changes in the  cervical spine. This most pronounced at C3-C4 where there is a prominent  posterior disc osteophyte complex which appears to cause spinal canal narrowing. Paranasal sinuses:  Paranasal sinuses are clear. .  Brain:  No midline shift. No enhancing mass lesion or hydrocephalus. .  Soft tissues:  Within normal limits. .    Dural venous sinuses:  Patent. Aortic arch:  Non-aneurysmal. Arch vessels are patent. Moderate atherosclerotic  changes are present. ANTERIOR CIRCULATION    Right common carotid artery:  No significant stenosis or occlusion. Right internal carotid artery:  No significant stenosis or occlusion. Moderate  atherosclerotic changes along the right carotid bulb without significant  stenosis. Vascular calcifications are present along the carotid siphon. Right middle cerebral artery:  No significant stenosis, occlusion, or aneurysm. Right anterior cerebral artery:  No significant stenosis, occlusion, or  aneurysm. Left common carotid artery: No significant stenosis or occlusion. Left internal carotid artery:  No significant stenosis or occlusion. Mild to  moderate atherosclerotic changes of the left carotid bulb without significant  stenosis. As the calcifications are present along the carotid siphon. Left middle cerebral artery:  No significant stenosis, occlusion, or aneurysm. Left anterior cerebral artery:  No significant stenosis, occlusion, or aneurysm. Anterior communicating artery: No significant stenosis, occlusion, or aneurysm. POSTERIOR CIRCULATION    Right vertebral artery:  No significant stenosis or occlusion. Left vertebral artery:  No significant stenosis or occlusion.   Basilar artery:  No significant stenosis, occlusion, or aneurysm. Right posterior communicating artery: No significant stenosis, occlusion, or  aneurysm. Left posterior communicating artery:  No significant stenosis, occlusion, or  aneurysm. Right posterior cerebral artery:  No significant stenosis, occlusion, or  aneurysm. Fetal type right PCA. Left posterior cerebral artery:  No significant stenosis, occlusion, or  aneurysm. Impression  No acute large vessel occlusion or high-grade stenosis. Moderate  atherosclerotic changes. Most recent MRI  Results from [place] encounter on 08/11/21    MRI BRAIN WO CONT    Narrative  Exam: MRI BRAIN WO CONT on 8/12/2021 10:22 AM    Clinical History: The Male patient is 78years old presenting for poss cva, with  weakness-No trauma-No surgery-No cancer-No prior. Comparison:  Head CT 8/11/2021    Technique:  Axial T2, axial FLAIR, axial diffusion-weighted,  sagittal T1 and  coronal gradient-echo scans were performed. Findings:    Age-related senescent changes are seen with sulcal and ventricular prominence. There is mild chronic confluent periventricular white matter disease with  scattered lacunae throughout the corona radiata and centrum semiovale. No  evidence of restricted diffusion seen to suggest acute ischemia. There are no abnormal extra-axial fluid collections. No evidence of mass or mass  effect is seen. There is no diffusion signal abnormality. Expected flow voids  are maintained in the major intracranial vessels. Cerebellar involutional changes are seen. Visualized brainstem structures are  unremarkable. There is no evidence of Chiari malformation. The ventricular system and CSF containing spaces are proportionate to the degree  of cerebral involution. .    Visualized extracranial soft tissues are unremarkable. The paranasal sinuses are well pneumatized and aerated. Impression  1.   Age-related senescent changes and chronic microvascular disease without  acute intracranial abnormality.     CPT code(s) 82209          Signed By: Meseret Sauceda MD     August 13, 2021

## 2021-08-13 NOTE — PROGRESS NOTES
08/13/21 0718   NIH Stroke Scale   Interval Other (comment)  (dual w/ Harini Roberts RN)   Level of Conciousness (1a) 0   LOC Questions (1b) 0   LOC Commands (1c) 0   Best Gaze (2) 0   Visual (3) 0   Facial Palsy (4) 0   Motor Arm, Left (5a) 0   Motor Arm, Right (5b) 0   Motor Leg, Left (6a) 0   Motor Leg, Right (6b) 0   Limb Ataxia (7) (!) 1   Sensory (8) 0   Best Language (9) 0   Dysarthria (10) 0   Extinction and Inattention (11) 0   Total 1

## 2021-08-14 PROBLEM — R42 ORTHOSTATIC LIGHTHEADEDNESS: Status: ACTIVE | Noted: 2021-01-01

## 2021-08-14 NOTE — PROGRESS NOTES
Problem: Falls - Risk of  Goal: *Absence of Falls  Description: Document Myesha White Fall Risk and appropriate interventions in the flowsheet.   Outcome: Progressing Towards Goal  Note: Fall Risk Interventions:  Mobility Interventions: Communicate number of staff needed for ambulation/transfer, Patient to call before getting OOB    Mentation Interventions: Bed/chair exit alarm, Door open when patient unattended, More frequent rounding    Medication Interventions: Bed/chair exit alarm, Patient to call before getting OOB, Teach patient to arise slowly    Elimination Interventions: Bed/chair exit alarm, Call light in reach, Patient to call for help with toileting needs, Toileting schedule/hourly rounds    History of Falls Interventions: Bed/chair exit alarm, Door open when patient unattended         Problem: Patient Education: Go to Patient Education Activity  Goal: Patient/Family Education  Outcome: Progressing Towards Goal     Problem: Patient Education: Go to Patient Education Activity  Goal: Patient/Family Education  Outcome: Progressing Towards Goal     Problem: TIA/CVA Stroke: Day 2 Until Discharge  Goal: Off Pathway (Use only if patient is Off Pathway)  Outcome: Progressing Towards Goal  Goal: Activity/Safety  Outcome: Progressing Towards Goal  Goal: Diagnostic Test/Procedures  Outcome: Progressing Towards Goal  Goal: Nutrition/Diet  Outcome: Progressing Towards Goal  Goal: Discharge Planning  Outcome: Progressing Towards Goal  Goal: Medications  Outcome: Progressing Towards Goal  Goal: Respiratory  Outcome: Progressing Towards Goal  Goal: Treatments/Interventions/Procedures  Outcome: Progressing Towards Goal  Goal: Psychosocial  Outcome: Progressing Towards Goal  Goal: *Verbalizes anxiety and depression are reduced or absent  Outcome: Progressing Towards Goal  Goal: *Absence of aspiration  Outcome: Progressing Towards Goal  Goal: *Absence of deep venous thrombosis signs and symptoms(Stroke Metric)  Outcome: Progressing Towards Goal  Goal: *Optimal pain control at patient's stated goal  Outcome: Progressing Towards Goal  Goal: *Tolerating diet  Outcome: Progressing Towards Goal  Goal: *Ability to perform ADLs and demonstrates progressive mobility and function  Outcome: Progressing Towards Goal  Goal: *Stroke education continued(Stroke Metric)  Outcome: Progressing Towards Goal     Problem: Ischemic Stroke: Discharge Outcomes  Goal: *Verbalizes anxiety and depression are reduced or absent  Outcome: Progressing Towards Goal  Goal: *Verbalize understanding of risk factor modification(Stroke Metric)  Outcome: Progressing Towards Goal  Goal: *Hemodynamically stable  Outcome: Progressing Towards Goal  Goal: *Absence of aspiration pneumonia  Outcome: Progressing Towards Goal  Goal: *Aware of needed dietary changes  Outcome: Progressing Towards Goal  Goal: *Verbalize understanding of prescribed medications including anti-coagulants, anti-lipid, and/or anti-platelets(Stroke Metric)  Outcome: Progressing Towards Goal  Goal: *Tolerating diet  Outcome: Progressing Towards Goal  Goal: *Aware of follow-up diagnostics related to anticoagulants  Outcome: Progressing Towards Goal  Goal: *Ability to perform ADLs and demonstrates progressive mobility and function  Outcome: Progressing Towards Goal  Goal: *Absence of DVT(Stroke Metric)  Outcome: Progressing Towards Goal  Goal: *Absence of aspiration  Outcome: Progressing Towards Goal  Goal: *Optimal pain control at patient's stated goal  Outcome: Progressing Towards Goal  Goal: *Home safety concerns addressed  Outcome: Progressing Towards Goal  Goal: *Describes available resources and support systems  Outcome: Progressing Towards Goal  Goal: *Verbalizes understanding of activation of EMS(911) for stroke symptoms(Stroke Metric)  Outcome: Progressing Towards Goal  Goal: *Understands and describes signs and symptoms to report to providers(Stroke Metric)  Outcome: Progressing Towards Goal  Goal: *Neurolgocially stable (absence of additional neurological deficits)  Outcome: Progressing Towards Goal  Goal: *Verbalizes importance of follow-up with primary care physician(Stroke Metric)  Outcome: Progressing Towards Goal  Goal: *Smoking cessation discussed,if applicable(Stroke Metric)  Outcome: Progressing Towards Goal  Goal: *Depression screening completed(Stroke Metric)  Outcome: Progressing Towards Goal     Problem: Patient Education: Go to Patient Education Activity  Goal: Patient/Family Education  Outcome: Progressing Towards Goal     Problem: Patient Education: Go to Patient Education Activity  Goal: Patient/Family Education  Outcome: Progressing Towards Goal     Problem: Patient Education: Go to Patient Education Activity  Goal: Patient/Family Education  Outcome: Progressing Towards Goal     Problem: Pressure Injury - Risk of  Goal: *Prevention of pressure injury  Description: Document Aakash Scale and appropriate interventions in the flowsheet.   Outcome: Progressing Towards Goal  Note: Pressure Injury Interventions:  Sensory Interventions: Assess changes in LOC, Check visual cues for pain, Keep linens dry and wrinkle-free, Minimize linen layers, Pressure redistribution bed/mattress (bed type)    Moisture Interventions: Apply protective barrier, creams and emollients, Check for incontinence Q2 hours and as needed, Maintain skin hydration (lotion/cream), Minimize layers    Activity Interventions: Increase time out of bed, Pressure redistribution bed/mattress(bed type), PT/OT evaluation    Mobility Interventions: HOB 30 degrees or less, Pressure redistribution bed/mattress (bed type), PT/OT evaluation    Nutrition Interventions: Document food/fluid/supplement intake, Offer support with meals,snacks and hydration                     Problem: Patient Education: Go to Patient Education Activity  Goal: Patient/Family Education  Outcome: Progressing Towards Goal

## 2021-08-14 NOTE — PROGRESS NOTES
Problem: Falls - Risk of  Goal: *Absence of Falls  Description: Document Mora Spare Fall Risk and appropriate interventions in the flowsheet.   Outcome: Progressing Towards Goal  Note: Fall Risk Interventions:  Mobility Interventions: Bed/chair exit alarm    Mentation Interventions: Bed/chair exit alarm    Medication Interventions: Bed/chair exit alarm    Elimination Interventions: Call light in reach    History of Falls Interventions: Bed/chair exit alarm         Problem: Patient Education: Go to Patient Education Activity  Goal: Patient/Family Education  Outcome: Progressing Towards Goal     Problem: Patient Education: Go to Patient Education Activity  Goal: Patient/Family Education  Outcome: Progressing Towards Goal     Problem: TIA/CVA Stroke: Day 2 Until Discharge  Goal: Activity/Safety  Outcome: Progressing Towards Goal  Goal: Diagnostic Test/Procedures  Outcome: Progressing Towards Goal  Goal: Nutrition/Diet  Outcome: Progressing Towards Goal  Goal: Discharge Planning  Outcome: Progressing Towards Goal  Goal: Medications  Outcome: Progressing Towards Goal  Goal: Respiratory  Outcome: Progressing Towards Goal  Goal: Treatments/Interventions/Procedures  Outcome: Progressing Towards Goal  Goal: Psychosocial  Outcome: Progressing Towards Goal  Goal: *Verbalizes anxiety and depression are reduced or absent  Outcome: Progressing Towards Goal  Goal: *Absence of aspiration  Outcome: Progressing Towards Goal  Goal: *Absence of deep venous thrombosis signs and symptoms(Stroke Metric)  Outcome: Progressing Towards Goal  Goal: *Optimal pain control at patient's stated goal  Outcome: Progressing Towards Goal  Goal: *Tolerating diet  Outcome: Progressing Towards Goal  Goal: *Ability to perform ADLs and demonstrates progressive mobility and function  Outcome: Progressing Towards Goal  Goal: *Stroke education continued(Stroke Metric)  Outcome: Progressing Towards Goal     Problem: Patient Education: Go to Patient Education Activity  Goal: Patient/Family Education  Outcome: Progressing Towards Goal     Problem: Patient Education: Go to Patient Education Activity  Goal: Patient/Family Education  Outcome: Progressing Towards Goal     Problem: Patient Education: Go to Patient Education Activity  Goal: Patient/Family Education  Outcome: Progressing Towards Goal     Problem: Pressure Injury - Risk of  Goal: *Prevention of pressure injury  Description: Document Aakash Scale and appropriate interventions in the flowsheet.   Outcome: Progressing Towards Goal  Note: Pressure Injury Interventions:  Sensory Interventions: Assess changes in LOC    Moisture Interventions: Absorbent underpads, Check for incontinence Q2 hours and as needed    Activity Interventions: Pressure redistribution bed/mattress(bed type)    Mobility Interventions: Pressure redistribution bed/mattress (bed type)    Nutrition Interventions: Document food/fluid/supplement intake, Offer support with meals,snacks and hydration                     Problem: Patient Education: Go to Patient Education Activity  Goal: Patient/Family Education  Outcome: Progressing Towards Goal

## 2021-08-14 NOTE — PROGRESS NOTES
08/14/21 0719   NIH Stroke Scale   Interval Handoff/Transfer  (Dual [facility] with JOHN Dominguez )   Level of Conciousness (1a) 0   LOC Questions (1b) (!) 1   LOC Commands (1c) 0   Best Gaze (2) 0   Visual (3) 0   Facial Palsy (4) 0   Motor Arm, Left (5a) 0   Motor Arm, Right (5b) 0   Motor Leg, Left (6a) 0   Motor Leg, Right (6b) 0   Limb Ataxia (7) (!) 1   Sensory (8) 0   Best Language (9) 0   Dysarthria (10) 0   Extinction and Inattention (11) 0   Total 2

## 2021-08-14 NOTE — PROGRESS NOTES
08/13/21 2000   NIH Stroke Scale   Interval Other (comment)  (Dual NIH with Jeff Jennings RN )   Level of Conciousness (1a) 0   LOC Questions (1b) (!) 1   LOC Commands (1c) 0   Best Gaze (2) 0   Visual (3) 0   Facial Palsy (4) 0   Motor Arm, Left (5a) 0   Motor Arm, Right (5b) 0   Motor Leg, Left (6a) 0   Motor Leg, Right (6b) 0   Limb Ataxia (7) (!) 1   Sensory (8) 0   Best Language (9) 0   Dysarthria (10) 0   Extinction and Inattention (11) 0   Total 2

## 2021-08-14 NOTE — DISCHARGE SUMMARY
Hospitalist Discharge Summary     Patient ID:  Leslee Koyanagi  [id number]  89 y.o.  1940  Admit date: 8/11/2021  Discharge date: 8/14/2021  Attending: Rosana Bloom DO  PCP:  Suzi Vasquez MD  Treatment Team: Attending Provider: Sarai Rod; Primary Nurse: Hetal Lin RN; Utilization Review: Ara Wolfe RN; Consulting Provider: Eliecer Castorena MD; Consulting Provider: Soheila Hull MD; Utilization Review: Lupe Sauceda RN; Charge Nurse: Sean Delgadillo RN    Principal Diagnosis Hypertensive emergency    Hospital Problems as of 8/14/2021 Date Reviewed: 6/30/2021        Codes Class Noted - Resolved POA    * (Principal) Hypertensive emergency ICD-10-CM: I16.1  ICD-9-CM: 401.9  8/11/2021 - Present Yes        Ataxia ICD-10-CM: R27.0  ICD-9-CM: 781.3  8/12/2021 - Present Yes        Right arm weakness ICD-10-CM: R29.898  ICD-9-CM: 729.89  8/11/2021 - Present Yes        Multiple falls ICD-10-CM: R29.6  ICD-9-CM: V15.88  8/12/2021 - Present Yes        Essential hypertension ICD-10-CM: I10  ICD-9-CM: 401.9  6/6/2020 - Present Yes        CKD (chronic kidney disease) stage 3, GFR 30-59 ml/min ([facility]) ICD-10-CM: N18.30  ICD-9-CM: 585.3  12/2/2019 - Present Yes        Benign prostatic hyperplasia with nocturia ICD-10-CM: N40.1, R35.1  ICD-9-CM: 600.01, 788.43  1/23/2020 - Present Yes        Bilateral hearing loss ICD-10-CM: H91.93  ICD-9-CM: 389.9  12/2/2019 - Present Yes        Orthostatic lightheadedness ICD-10-CM: R42  ICD-9-CM: 780.4  8/14/2021 - Present Yes              Hospital Course:  Please refer to the admission H&P for details of presentation. In summary, the patient is a 78 y. o. male with medical history of BPH, CKD stage III who presented to ED his PCPs office due to multiple falls within 2 to 3 weeks.  Per wife at bed side patient had fallen about 3 times since 2 weeks ago with last fall yesterday evening.  Reports she has noticed that he has been getting weaker on his right hand and unable to feed himself he normally would. Aracely Disla also have intermittent slurred speech with word finding difficulty.  Had loss of consciousness or head trauma during his falls.  Reports that his gait seemed to be unsteady. He is hard of hearing but appears to be alert and oriented x3.  Reports that he is in normal state of health without any fever, chills, shortness of breath, chest pain, abdominal pain, nausea, vomiting except for recent multiple falls. In the ED, patient was noted to be hypertensive to 225/123, saturating well on room air. Laboratory work up is unremarkable. CT head without acute intracranial abnormalities.  Chest x-ray with no acute cardiopulmonary disease but showed right upper lobe nodule which appeared to be present on prior PET scan. He was admitted for stroke rule out. MRI Brain only showed chronic microvascular disease. ECHO was normal. Neurology was consulted. MRI Cervical showed mild central canal stenosis, but nothing serious. He was orthostatic positive. It was recommended to stop Flomax but the patient and wife wanted to talk to Urology first. He remained stable and was discharged home. He was instructed to drink ice cold water every morning and use safety precautions when standing. He will get an outpatient EMG. Significant Diagnostic Studies:    Labs: Results:       Chemistry Recent Labs     08/12/21  0531 08/11/21  1439   GLU 87 95    138   K 3.6 4.2    104   CO2 26 26   BUN 17 17   CREA 1.10 1.39   CA 8.9 9.3   AGAP 8 8      CBC w/Diff Recent Labs     08/12/21  0531 08/11/21  1439   WBC 8.8 8.9   RBC 4.63 4.80   HGB 14.2 15.0   HCT 42.0 45.0    271   GRANS  --  69   LYMPH  --  19   EOS  --  0*      Cardiac Enzymes No results for input(s): CPK, CKND1, DERRICK in the last 72 hours.     No lab exists for component: CKRMB, TROIP   Coagulation Recent Labs     08/11/21  1439   PTP 13.5   INR 1.0       Lipid Panel Lab Results   Component Value Date/Time    Cholesterol, total 131 08/12/2021 05:31 AM    HDL Cholesterol 32 (L) 08/12/2021 05:31 AM    LDL, calculated 81.4 08/12/2021 05:31 AM    VLDL, calculated 17.6 08/12/2021 05:31 AM    Triglyceride 88 08/12/2021 05:31 AM    CHOL/HDL Ratio 4.1 08/12/2021 05:31 AM      BNP No results for input(s): BNPP in the last 72 hours. Liver Enzymes No results for input(s): TP, ALB, TBIL, AP in the last 72 hours. No lab exists for component: SGOT, GPT, DBIL   Thyroid Studies Lab Results   Component Value Date/Time    TSH 3.210 03/11/2021 10:18 AM            Imaging:  XR CHEST PA LAT    Result Date: 8/11/2021  1. No evidence of acute cardiopulmonary disease. 2. Likely round correlate on the lateral view for the right upper lobe nodule demonstrated on the prior PET CT. Please see the prior PET CT for management recommendations. VOICE DICTATED BY: Dr. Anita Bruno    Result Date: 8/12/2021  Aspiration and penetration, as above. Please see concurrent speech and language pathology report for a complete description of findings. MRI BRAIN WO CONT    Result Date: 8/12/2021  1. Age-related senescent changes and chronic microvascular disease without acute intracranial abnormality. CPT code(s) [id number]     MRI CERV SPINE WO CONT    Result Date: 8/13/2021  1. Cervical spondylosis resulting in mild central canal stenosis at C3-4 and C4-5. 2. Moderate right neural foraminal narrowing at C3-4. CT HEAD WO CONT    Result Date: 8/11/2021  No acute intracranial abnormality. VOICE DICTATED BY: Dr. Shreyas Grewal CONT    Result Date: 8/12/2021  No acute large vessel occlusion or high-grade stenosis. Moderate atherosclerotic changes. Microbiology/Cultures:   All Micro Results     None          Discharge Exam:  Visit Vitals  BP (!) 189/82 (BP 1 Location: Left upper arm, BP Patient Position: Supine)   Pulse 76   Temp 98 °F (36.7 °C)   Resp 18   Ht 6' (1.829 m)   Wt 89.8 kg (198 lb)   SpO2 97%   BMI 26.85 kg/m²     General appearance: alert, cooperative, no distress, appears stated age  Lungs: clear to auscultation bilaterally  Heart: regular rate and rhythm, S1, S2 normal, no murmur, click, rub or gallop  Abdomen: soft, non-tender. Bowel sounds normal. No masses,  no organomegaly  Extremities: no cyanosis or edema  Neurologic: Grossly normal, unstable gait, RUE 4/5 strength    Disposition: home  Discharge Condition: stable  Patient Instructions:   Current Discharge Medication List      START taking these medications    Details   aspirin 81 mg chewable tablet Take 1 Tablet by mouth daily. Qty: 30 Tablet, Refills: 2  Start date: 8/15/2021         CONTINUE these medications which have NOT CHANGED    Details   tamsulosin (FLOMAX) 0.4 mg capsule Take 1 Capsule by mouth daily. finasteride (PROSCAR) 5 mg tablet Take 1 Tab by mouth daily.  TAKE ONE TABLET BY MOUTH EVERY DAY  Qty: 80 Tab, Refills: 1    Associated Diagnoses: Nocturia associated with benign prostatic hyperplasia         STOP taking these medications       tiZANidine (ZANAFLEX) 2 mg tablet Comments:   Reason for Stopping:         pantoprazole (PROTONIX) 40 mg tablet Comments:   Reason for Stopping:               Activity: Activity as tolerated  Diet: Regular Diet  Wound Care: None needed    Follow-up  ·   PCP in one week  · Neurology in 6-8 weeks  · Urology in one week  Time spent to discharge patient 35 minutes  Signed:  Tereza Kilpatrick DO  8/14/2021  9:24 AM

## 2021-08-14 NOTE — PROGRESS NOTES
Patient is up for discharge. Previous CM already sent referral; this CM faxed d/c summary to [facility]. No other needs identified at this time. Care Management Interventions  PCP Verified by CM: Yes (Smart)  Mode of Transport at Discharge:  Other (see comment)  Transition of Care Consult (CM Consult): Discharge Planning  Discharge Durable Medical Equipment: No  Physical Therapy Consult: Yes  Occupational Therapy Consult: Yes  Speech Therapy Consult: Yes  Current Support Network: Lives with Spouse, Family Lives [place], Own Home  Confirm Follow Up Transport: Family  The Plan for Transition of Care is Related to the Following Treatment Goals : Return home and back to his baseline  Discharge Location  Discharge Placement: Home with home health

## 2021-08-14 NOTE — PROGRESS NOTES
Neurology Daily Progress Note     Assessment:     78 y.o. male who is being seen for right arm weakness and unsteady gait. MRI showed mild central canal stenosis at C3-4, C4-5; moderate right neural foraminal narrowing at C3-4. Orthostatic BP positive with 20 pt drop in SBP. No additional neurological workup is needed at this time. Patient can follow up as outpatient in the clinic after d/c.    Plan:     Recommend Orthopedic Spine consult as outpatient for cervical stenosis    EMG/NCS as outpatient to evaluate for radiculopathy RUE    Recommend drinking 16 oz of ice cold water prior to getting up in the morning    Consider splenic binder     Recommend avoiding hot showers or hot environments    Fall precautions    Neurology follow up in 4 weeks after discharge with Dr. Natalee White in the outpatient clinic at [address]. Subjective: Interval history:    Continues to have RUE weakness and gait imbalance. Orthostatic BP positive with 20 pt drop in SBP. MRI showed mild central canal stenosis at C3-4, C4-5; moderate right neural foraminal narrowing at C3-4. History:    Jolanta Urias is a 78 y.o. male who is being seen for RUE weakness and unsteady gait. Review of systems negative with exception of pertinent positives and negatives noted above.        Objective:     Vitals:    08/13/21 2000 08/14/21 0000 08/14/21 0400 08/14/21 0836   BP: (!) 162/80 (!) 182/84 (!) 160/85 (!) 189/82   Pulse: 80 85 78 76   Resp: 20 20 20 18   Temp: 98.5 °F (36.9 °C) 98.3 °F (36.8 °C) 98.2 °F (36.8 °C) 98 °F (36.7 °C)   SpO2: 95% 96% 97% 97%   Weight:       Height:              Current Facility-Administered Medications:     oxyCODONE IR (ROXICODONE) tablet 5 mg, 5 mg, Oral, Q4H PRN, Dee Lira, DO, 5 mg at 08/14/21 0845    tamsulosin (FLOMAX) capsule 0.4 mg, 0.4 mg, Oral, DAILY, Dee Lira, DO, 0.4 mg at 08/14/21 0844    finasteride (PROSCAR) tablet 5 mg, 5 mg, Oral, DAILY, Dee Lira E, DO, 5 mg at 08/14/21 0844    sodium chloride (NS) flush 5-10 mL, 5-10 mL, IntraVENous, Q8H, Ivan Braun DO, 5 mL at 08/14/21 0846    sodium chloride (NS) flush 5-10 mL, 5-10 mL, IntraVENous, PRN, Ivan Braun,     sodium chloride (NS) flush 5-40 mL, 5-40 mL, IntraVENous, Q8H, Casimiro Courtney MD, 10 mL at 08/13/21 2113    sodium chloride (NS) flush 5-40 mL, 5-40 mL, IntraVENous, PRN, Casimiro Courtney MD    ondansetron (ZOFRAN) injection 4 mg, 4 mg, IntraVENous, Q4H PRN, Ericund Casimiro Rogers MD    aspirin chewable tablet 81 mg, 81 mg, Oral, DAILY, Casimiro Courtney MD, 81 mg at 08/14/21 0844    acetaminophen (TYLENOL) tablet 650 mg, 650 mg, Oral, Q4H PRN, Keysha Mensah MD, 650 mg at 08/13/21 1420    acetaminophen (TYLENOL) suppository 650 mg, 650 mg, Rectal, Q4H PRN, Keysha Mensah MD    bisacodyL (DULCOLAX) suppository 10 mg, 10 mg, Rectal, DAILY PRN, Keysha Mensah MD    famotidine (PEPCID) tablet 20 mg, 20 mg, Oral, BID PRN, Keysha Mensah MD    enoxaparin (LOVENOX) injection 40 mg, 40 mg, SubCUTAneous, Q24H, Casimiro Courtney MD, 40 mg at 08/11/21 2143    hydrALAZINE (APRESOLINE) tablet 25 mg, 25 mg, Oral, QID PRN, Keysha Mensah MD, 25 mg at 08/14/21 0845    Recent Results (from the past 12 hour(s))   PLEASE READ & DOCUMENT PPD TEST IN 48 HRS    Collection Time: 08/13/21  9:43 PM   Result Value Ref Range    PPD Negative Negative    mm Induration 0 0 - 5 mm     MRI Results (most recent):  Results from [place] encounter on 08/11/21    MRI CERV SPINE WO CONT    Narrative  MRI cervical spine without contrast:    HISTORY: - 78year old here for c-spine MRI without contrast-- right arm  weakness-- unsteadiness, falls-- no history of cancer--    Imaging sequences: Multiplanar multisequence imaging was performed on a 1.5  Rosi magnet. COMPARISON: None. FINDINGS: The vertebral body height alignment are well-maintained.  The disc  space heights are well-preserved. C2-3: Negative    C3-4: There is a shallow disc-osteophyte complex. There is mild central canal  stenosis. Uncovertebral joint spurring causes moderate right neural foraminal  narrowing. The left neural foramen is patent. C4-5: There is a disc-osteophyte complex causing mild central canal stenosis. The neural foramen are patent. C5-6: There is a shallow disc-osteophyte complex without central canal stenosis. The neural foramen are patent. C6-7: Negative    C7-T1: Negative    The cervical cord is normal in signal intensity and morphology. The marrow is  normal in signal intensity. Impression  1. Cervical spondylosis resulting in mild central canal stenosis at C3-4 and  C4-5.  2. Moderate right neural foraminal narrowing at C3-4. Physical Exam:  General - Well developed, well nourished, in no apparent distress. Pleasant and conversent. HEENT - Normocephalic, atraumatic. Conjunctiva are clear. Neck - Supple without masses  Cardiovascular - Regular rate and rhythm. Normal S1, S2 without murmurs, rubs, or gallops. Lungs - Clear to auscultation. Abdomen - Soft, nontender with normal bowel sounds. Extremities - Peripheral pulses intact. No edema and no rashes. Neurological examination - Comprehension, attention, memory and reasoning are intact. Language and speech are normal.   On cranial nerve examination, (II, III, IV, VI) pupils are equal, round, and reactive to light. Visual acuity is adequate. Visual fields are full to finger confrontation. Extraocular motility is normal. (V, VII) Face is symmetric and sensation is intact to light touch. (VIII) Hearing is intact. (IX, X) Palate and uvula elevate symmetrically. Voice is normal. (XI) Shoulder shrug is strong and equal bilaterally. (XII)Tongue is midline. Motor examination - There is normal muscle tone and bulk. Power is full throughout with exception of 4-/5 RUE with distal  4-/5.  Muscle stretch reflexes are mildly hyperreflexic throughout. + Tromner on right Plantar response is flexor. Sensation is intact to light touch, pinprick in all extremities. Cerebellar examination is normal finger/nose.     Signed By: KAYLAN Gerber     August 14, 2021

## 2021-08-14 NOTE — PROGRESS NOTES
08/14/21 1050   NIH Stroke Scale   Interval Other (comment)  (discharge)   Level of Conciousness (1a) 0   LOC Questions (1b) (!) 1   LOC Commands (1c) 0   Best Gaze (2) 0   Visual (3) 0   Facial Palsy (4) 0   Motor Arm, Left (5a) 0   Motor Arm, Right (5b) 0   Motor Leg, Left (6a) 0   Motor Leg, Right (6b) 0   Limb Ataxia (7) (!) 1   Sensory (8) 0   Best Language (9) 0   Dysarthria (10) 0   Extinction and Inattention (11) 0   Total 2

## 2021-08-16 NOTE — PROGRESS NOTES
Care Transitions Initial Call    Call within 2 business days of discharge: Yes     Patient: Dustin Cain Patient : 1940 MRN: [id number]    [facility]       Complaint Diagnosis Description Type Department Provider    21 Extremity Weakness Right arm weakness . .. ED to Hosp-Admission (Discharged) (ADMIT) [id number] Dee Lira, DO; [facility]. .. Was this an external facility discharge? No Discharge Facility:     Challenges to be reviewed by the provider   Additional needs identified to be addressed with provider: no  none         Method of communication with provider : none    Discussed COVID-19 related testing which was not done at this time. Test results were not done. Patient informed of results, if available? Advance Care Planning:   Does patient have an Advance Directive: decision makers updated. Inpatient Readmission Risk score: Unplanned Readmit Risk Score: 10    Was this a readmission? no   Patient stated reason for the admission:     Patients top risk factors for readmission: right arm weakness, mulitple falls   Interventions to address risk factors: Obtained and reviewed discharge summary and/or continuity of care documents    Care Transition Nurse (CTN) contacted the family, spouse, Dena Padilla (DESTINI 21 noted in [facility]) by telephone to perform post hospital discharge assessment. Verified name and  with family as identifiers. Provided introduction to self, and explanation of the CTN role. CTN reviewed discharge instructions, medical action plan and red flags with family who verbalized understanding. Were discharge instructions available to patient? yes. Reviewed appropriate site of care based on symptoms and resources available to patient including: PCP, Specialist, When to call 911 and [address]. Family given an opportunity to ask questions and does not have any further questions or concerns at this time.  The family agrees to contact the PCP office for questions related to their healthcare. Medication reconciliation was performed with family, who verbalizes understanding of administration of home medications. Advised obtaining a 90-day supply of all daily and as-needed medications. Referral to Pharm D needed: no     Home Health/Outpatient orders at discharge: PT, OT, SN and [address]: [facility]  Date of initial visit: 8/16/21    Durable Medical Equipment ordered at discharge: None  [address]:   Durable Medical Equipment received:     Covid Risk Education    Educated patient about risk for severe COVID-19 due to risk factors according to CDC guidelines. LPN CC reviewed discharge instructions, medical action plan and red flag symptoms with the family who verbalized understanding. Discussed COVID vaccination status: yes. Education provided on COVID-19 vaccination as appropriate. Discussed exposure protocols and quarantine with CDC Guidelines. Family was given an opportunity to verbalize any questions and concerns and agrees to contact LPN CC or health care provider for questions related to their healthcare. Was patient discharged with a pulse oximeter? no.   Discussed follow-up appointments. If no appointment was previously scheduled, appointment scheduling offered: n/a. Is follow up appointment scheduled within 7 days of discharge? yes. [facility]):   Future Appointments   Date Time Provider Sophy Browni   8/17/2021  9:30 AM Flo Aguilar NP [id number] PGU   9/16/2021  8:45 AM PFP LAB SSA PFP PFP   9/23/2021 11:00 AM Jessie Esquivel MD SSA PFP PFP   11/17/2021  3:20 PM Fred Lim MD BSND BSND   4/4/2022 10:45 AM [id number] BLOOD DRAW [id number] PGU   4/11/2022 10:40 AM Ervin Shelby MD [id number] PGU     Non-BS follow up appointment(s):     Plan for follow-up call in 10-14 days based on severity of symptoms and risk factors.   Plan for next call: f/u urology 8/17, current therapy progress  CTN provided contact information for future needs. Goals Addressed                 This Visit's Progress     Returns to baseline activity level. Pt will work with [facility] services to increase strength and function.   (Hx of falls)

## 2021-09-15 NOTE — PROGRESS NOTES
Care Transitions Follow Up Call    Challenges to be reviewed by the provider   Additional needs identified to be addressed with provider: no  none           Method of communication with provider : none    Care Transition Nurse (CTN) contacted the family, spouse Jamie Osullivan (current DESTINI)  by telephone to follow up after admission on 21. Verified name and  with family as identifiers. Addressed changes since last contact: Pt continues with [facility]-PT, OT and RN  Follow up appointment completed? yes. Was follow up appointment scheduled within 7 days of discharge? no.     Advance Care Planning:   Does patient have an Advance Directive:  health care decision makers updated    CTN reviewed discharge instructions, medical action plan and red flags with family and discussed any barriers to care and/or understanding of plan of care after discharge. Discussed appropriate site of care based on symptoms and resources available to patient including: When to call 911. The family agrees to contact the PCP office for questions related to their healthcare. Patients top risk factors for readmission: Hypertensive Emergency   Interventions to address risk factors: Obtained and reviewed discharge summary and/or continuity of care documents    [facility] follow up appointment(s):   Future Appointments   Date Time Provider Sophy Aviles   2021  8:45 AM PFP LAB SSA PFP PFP   2021  9:30 AM Aleyda Urrutia MD [id number] PGU   2021 11:00 AM Isis Esquivel MD SSA PFP PFP   2021  3:20 PM June Kim MD BSND BSND   2022 10:45 AM [id number] BLOOD DRAW [id number] PGU   2022 10:40 AM Aleyda Urrutia MD [id number] PGU         CTN provided contact information for future needs. No further follow-up call indicated based on severity of symptoms and risk factors. Goals Addressed                 This Visit's Progress     Returns to baseline activity level.    On track     Pt will work with [facility] services to increase strength and function.   (Hx of falls)

## 2021-09-23 PROBLEM — R53.1 WEAKNESS GENERALIZED: Status: ACTIVE | Noted: 2021-01-01

## 2021-09-23 PROBLEM — R26.2 INABILITY TO WALK: Status: ACTIVE | Noted: 2021-01-01

## 2021-09-23 PROBLEM — R49.0 HOARSENESS: Status: ACTIVE | Noted: 2021-01-01

## 2021-09-23 NOTE — CONSULTS
Consult    Patient: Tutu Taylor MRN: [id number]  SSN: [id number]    YOB: 1940  Age: 78 y.o. Sex: male        Assessment:     Progressive gait dysfunction, hoarseness, diffuse hyper-reflexia, mild RUE weakness and increased motor tone in 4 extremities. Previous evaluation demonstrated mid cervical spinal central canal stenosis that has been interpreted as mild by radiology but looks more than mild to me. Barium swallow demonstrated aspiration of thin liquids. 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 Consider spine surgery consultaiton    I will try to coordinate EMG while in patient    Orthostatic BP and pulse    Subjective: Tutu Taylor is a 78 y.o. male who is being seen for falls and weakness previously seen in mid August. Findings at that time suggestive of mild myelopathy. B12 >600. MRI showed central canal narrowing midcervical. Plan was for outpatient EMG and ortho spine surgery consult. Also patient found to be orthostatic with 20 point BP drop and to have aspiration of thin liquids. Since DC has worsened slightly in all aspects according to his wife. Minimally ambulatory with walker and standby assist. Seen at PCP today and directed to ED by Dr. Misty Carter.         Past Medical History:   Diagnosis Date    Chronic kidney disease     Elevated PSA     Hearing difficulty     Hepatitis A 2020    Hypercholesterolemia     Kidney stone      Past Surgical History:   Procedure Laterality Date    HX COLONOSCOPY  05/18/2016      Family History   Problem Relation Age of Onset    No Known Problems Mother     Cancer Father     No Known Problems Maternal Grandmother     No Known Problems Maternal Grandfather     No Known Problems Paternal Grandmother     No Known Problems Paternal Grandfather      Social History     Tobacco Use    Smoking status: Never Smoker    Smokeless tobacco: Never Used   Substance Use Topics    Alcohol use: No     Alcohol/week: 0.0 standard drinks      Current Outpatient Medications   Medication Sig Dispense Refill    finasteride (PROSCAR) 5 mg tablet Take 1 Tablet by mouth daily. TAKE ONE TABLET BY MOUTH EVERY DAY 90 Tablet 1    tamsulosin (FLOMAX) 0.4 mg capsule Take 1 Capsule by mouth daily. 90 Capsule 1    predniSONE (DELTASONE) 20 mg tablet Take 20 mg by mouth two (2) times a day for 5 days. 10 Tablet 0    aspirin 81 mg chewable tablet Take 1 Tablet by mouth daily. 30 Tablet 2        Allergies   Allergen Reactions    Augmentin [Amoxicillin-Pot Clavulanate] Nausea and Vomiting       Review of Systems: Review of Systems - General ROS: negative  Psychological ROS: negative  Ophthalmic ROS: negative  ENT ROS: negative  Allergy and Immunology ROS: negative  Hematological and Lymphatic ROS: negative  Endocrine ROS: negative  Respiratory ROS: no cough, shortness of breath, or wheezing  Cardiovascular ROS: no chest pain or dyspnea on exertion  Gastrointestinal ROS: no abdominal pain, change in bowel habits, or black or bloody stools  Genito-Urinary ROS: no dysuria, trouble voiding, or hematuria  Musculoskeletal ROS: negative  Neurological ROS: see hpi  Dermatological ROS: negative        Objective:     Vitals:    09/23/21 1422 09/23/21 1423 09/23/21 1424 09/23/21 1425   BP: (!) 169/81 129/63 (!) 162/74    Pulse:       Resp:       Temp:       SpO2: 96% (!) 87% 97% 98%        Physical Exam:      General: well nourished, appears stated age    Eyes: no proptosis or exophthalmos; conjunctivae clear, sclerae non-icteric    Chest: clear to auscultation    Cardiac: normal S1 S2; no murmurs gallop or rubs    Neurological:    MSE: alert, oriented times 3;dysarthric  Speech;?  Spasmodic dysphonia:  no paraphasic errors; follows commands without difficulty    CN 2: visual fields full; no afferent pupillary defect; VA not checked; fundoscopic exam ... CN 3,4,6: Pupils symmetrical in size, reactive to light directly and consensually; no ptosis; full versions and ductions  CN 5: facial sensation intact to light touch and pin prick. Corneal reflex . .. CN 7: Symmetrical facial tone and movements  CN 8 responds to spoken voice  CN 9,10; palate symmetrical gag intact  CN 11: head turn and shoulder shrug intact  CN 12: tongue midline without atrophy or fasiculations    Motor:  Power 5-/5 UE and LE proximal to distal  Fine motor movements symmetrical  Tone diffusely inceased  Atrophy: absent but patient was full clothed in ED moore bed and I was unable to adequately assess for atrophy or fasiculations  Gait: unable to assess    Cerebellar:  No gross tremor    Sensory    Intact to primary modalities in all 4 extremities    Reflexes    Diffusely hyper reflexic  Plantar response flexor left and possible extensor right    No results found for this or any previous visit (from the past 24 hour(s)). Lab Results   Component Value Date/Time    Cholesterol, total 131 08/12/2021 05:31 AM    HDL Cholesterol 32 (L) 08/12/2021 05:31 AM    LDL, calculated 81.4 08/12/2021 05:31 AM    VLDL, calculated 17.6 08/12/2021 05:31 AM    Triglyceride 88 08/12/2021 05:31 AM    CHOL/HDL Ratio 4.1 08/12/2021 05:31 AM        Lab Results   Component Value Date/Time    Hemoglobin A1c 5.6 08/12/2021 05:31 AM        CT Results (most recent):  Results from Hospital Encounter encounter on 08/11/21    CTA HEAD NECK W WO CONT    Narrative  Title:  CT arteriogram of the neck and head. Indication: Right arm weakness. Ataxia. Stroke like symptoms. .    Technique: Axial images of the neck and head were obtained after the uneventful  administration of intravenous iodinated contrast media. Contrast was used to  best identify the arterial structures.   Images were reviewed on a separate, free  standing, three-dimensional workstation as per the referring physicians request.      All stenosis percentages derived by comparing the narrowest segment with the  distal Internal Carotid Artery luminal diameter, as described in the [place]  American Symptomatic Carotid Endarterectomy Trial (NASCET) criteria. All CT scans at this facility are performed using dose reduction/dose modulation  techniques, as appropriate the performed exam, including the following:  Automated Exposure Control; Adjustment of the mA and/or kV according to patient  size (this includes techniques or standardized protocols for targeted exams  where dose is matched to indication/reason for exam); and Use of Iterative  Reconstruction Technique. Comparison: None. Findings:    Lungs:  No focal consolidation or pleural effusions. No suspicious pulmonary  nodules. .  Bones:  No osseous destruction. Moderate multilevel degenerative changes in the  cervical spine. This most pronounced at C3-C4 where there is a prominent  posterior disc osteophyte complex which appears to cause spinal canal narrowing. Paranasal sinuses:  Paranasal sinuses are clear. .  Brain:  No midline shift. No enhancing mass lesion or hydrocephalus. .  Soft tissues:  Within normal limits. .    Dural venous sinuses:  Patent. Aortic arch:  Non-aneurysmal. Arch vessels are patent. Moderate atherosclerotic  changes are present. ANTERIOR CIRCULATION    Right common carotid artery:  No significant stenosis or occlusion. Right internal carotid artery:  No significant stenosis or occlusion. Moderate  atherosclerotic changes along the right carotid bulb without significant  stenosis. Vascular calcifications are present along the carotid siphon. Right middle cerebral artery:  No significant stenosis, occlusion, or aneurysm. Right anterior cerebral artery:  No significant stenosis, occlusion, or  aneurysm. Left common carotid artery: No significant stenosis or occlusion.   Left internal carotid artery:  No significant stenosis or occlusion. Mild to  moderate atherosclerotic changes of the left carotid bulb without significant  stenosis. As the calcifications are present along the carotid siphon. Left middle cerebral artery:  No significant stenosis, occlusion, or aneurysm. Left anterior cerebral artery:  No significant stenosis, occlusion, or aneurysm. Anterior communicating artery: No significant stenosis, occlusion, or aneurysm. POSTERIOR CIRCULATION    Right vertebral artery:  No significant stenosis or occlusion. Left vertebral artery:  No significant stenosis or occlusion. Basilar artery:  No significant stenosis, occlusion, or aneurysm. Right posterior communicating artery: No significant stenosis, occlusion, or  aneurysm. Left posterior communicating artery:  No significant stenosis, occlusion, or  aneurysm. Right posterior cerebral artery:  No significant stenosis, occlusion, or  aneurysm. Fetal type right PCA. Left posterior cerebral artery:  No significant stenosis, occlusion, or  aneurysm. Impression  No acute large vessel occlusion or high-grade stenosis. Moderate  atherosclerotic changes.       Results for orders placed or performed during the hospital encounter of 08/11/21   EKG, 12 LEAD, INITIAL   Result Value Ref Range    Ventricular Rate 67 BPM    Atrial Rate 67 BPM    P-R Interval 174 ms    QRS Duration 74 ms    Q-T Interval 388 ms    QTC Calculation (Bezet) 409 ms    Calculated P Axis 53 degrees    Calculated R Axis 80 degrees    Calculated T Axis 65 degrees    Diagnosis       Normal sinus rhythm  Low voltage QRS  Cannot rule out Septal infarct , age undetermined  Abnormal ECG  No previous ECGs available  Confirmed by Nigel Dash ([id number]) on 8/11/2021 5:09:49 PM          MRI Results (most recent):  Results from Hospital Encounter encounter on 08/11/21    MRI CERV SPINE WO CONT    Narrative  MRI cervical spine without contrast:    HISTORY: - 78year old here for c-spine MRI without contrast-- right arm  weakness-- unsteadiness, falls-- no history of cancer--    Imaging sequences: Multiplanar multisequence imaging was performed on a 1.5  Rosi magnet. COMPARISON: None. FINDINGS: The vertebral body height alignment are well-maintained. The disc  space heights are well-preserved. C2-3: Negative    C3-4: There is a shallow disc-osteophyte complex. There is mild central canal  stenosis. Uncovertebral joint spurring causes moderate right neural foraminal  narrowing. The left neural foramen is patent. C4-5: There is a disc-osteophyte complex causing mild central canal stenosis. The neural foramen are patent. C5-6: There is a shallow disc-osteophyte complex without central canal stenosis. The neural foramen are patent. C6-7: Negative    C7-T1: Negative    The cervical cord is normal in signal intensity and morphology. The marrow is  normal in signal intensity. Impression  1. Cervical spondylosis resulting in mild central canal stenosis at C3-4 and  C4-5.  2. Moderate right neural foraminal narrowing at C3-4. US Results (most recent):  No results found for this or any previous visit. Most recent CTA  Results from [place] encounter on 08/11/21    CTA HEAD NECK W WO CONT    Narrative  Title:  CT arteriogram of the neck and head. Indication: Right arm weakness. Ataxia. Stroke like symptoms. .    Technique: Axial images of the neck and head were obtained after the uneventful  administration of intravenous iodinated contrast media. Contrast was used to  best identify the arterial structures. Images were reviewed on a separate, free  standing, three-dimensional workstation as per the referring physicians request.      All stenosis percentages derived by comparing the narrowest segment with the  distal Internal Carotid Artery luminal diameter, as described in the [place]  American Symptomatic Carotid Endarterectomy Trial (NASCET) criteria.     All CT scans at this facility are performed using dose reduction/dose modulation  techniques, as appropriate the performed exam, including the following:  Automated Exposure Control; Adjustment of the mA and/or kV according to patient  size (this includes techniques or standardized protocols for targeted exams  where dose is matched to indication/reason for exam); and Use of Iterative  Reconstruction Technique. Comparison: None. Findings:    Lungs:  No focal consolidation or pleural effusions. No suspicious pulmonary  nodules. .  Bones:  No osseous destruction. Moderate multilevel degenerative changes in the  cervical spine. This most pronounced at C3-C4 where there is a prominent  posterior disc osteophyte complex which appears to cause spinal canal narrowing. Paranasal sinuses:  Paranasal sinuses are clear. .  Brain:  No midline shift. No enhancing mass lesion or hydrocephalus. .  Soft tissues:  Within normal limits. .    Dural venous sinuses:  Patent. Aortic arch:  Non-aneurysmal. Arch vessels are patent. Moderate atherosclerotic  changes are present. ANTERIOR CIRCULATION    Right common carotid artery:  No significant stenosis or occlusion. Right internal carotid artery:  No significant stenosis or occlusion. Moderate  atherosclerotic changes along the right carotid bulb without significant  stenosis. Vascular calcifications are present along the carotid siphon. Right middle cerebral artery:  No significant stenosis, occlusion, or aneurysm. Right anterior cerebral artery:  No significant stenosis, occlusion, or  aneurysm. Left common carotid artery: No significant stenosis or occlusion. Left internal carotid artery:  No significant stenosis or occlusion. Mild to  moderate atherosclerotic changes of the left carotid bulb without significant  stenosis. As the calcifications are present along the carotid siphon. Left middle cerebral artery:  No significant stenosis, occlusion, or aneurysm.   Left anterior cerebral artery:  No significant stenosis, occlusion, or aneurysm. Anterior communicating artery: No significant stenosis, occlusion, or aneurysm. POSTERIOR CIRCULATION    Right vertebral artery:  No significant stenosis or occlusion. Left vertebral artery:  No significant stenosis or occlusion. Basilar artery:  No significant stenosis, occlusion, or aneurysm. Right posterior communicating artery: No significant stenosis, occlusion, or  aneurysm. Left posterior communicating artery:  No significant stenosis, occlusion, or  aneurysm. Right posterior cerebral artery:  No significant stenosis, occlusion, or  aneurysm. Fetal type right PCA. Left posterior cerebral artery:  No significant stenosis, occlusion, or  aneurysm. Impression  No acute large vessel occlusion or high-grade stenosis. Moderate  atherosclerotic changes. Most recent MRI  Results from [place] encounter on 08/11/21    MRI CERV SPINE WO CONT    Narrative  MRI cervical spine without contrast:    HISTORY: - 78year old here for c-spine MRI without contrast-- right arm  weakness-- unsteadiness, falls-- no history of cancer--    Imaging sequences: Multiplanar multisequence imaging was performed on a 1.5  Rosi magnet. COMPARISON: None. FINDINGS: The vertebral body height alignment are well-maintained. The disc  space heights are well-preserved. C2-3: Negative    C3-4: There is a shallow disc-osteophyte complex. There is mild central canal  stenosis. Uncovertebral joint spurring causes moderate right neural foraminal  narrowing. The left neural foramen is patent. C4-5: There is a disc-osteophyte complex causing mild central canal stenosis. The neural foramen are patent. C5-6: There is a shallow disc-osteophyte complex without central canal stenosis. The neural foramen are patent. C6-7: Negative    C7-T1: Negative    The cervical cord is normal in signal intensity and morphology.  The marrow is  normal in signal intensity. Impression  1. Cervical spondylosis resulting in mild central canal stenosis at C3-4 and  C4-5.  2. Moderate right neural foraminal narrowing at C3-4.           Signed By: Art Gustafson MD     September 23, 2021

## 2021-09-23 NOTE — H&P
Hospitalist Admission History and Physical     NAME:  Bay Real   Age:  78 y.o.  :   1940   MRN:   [id number]  PCP: Rigoberto Lennox, MD  Consulting MD:  Treatment Team: Attending Provider: Davie Puri DO; Primary Nurse: Louis Garcia    Chief Complaint   Patient presents with    Lethargy         HPI:   Patient is a 78 y.o. male who presented to the ED for cc persistent right arm weakness, inability to walk, and difficulty with speaking. Neurology has seen and is concerned of motor neuron disease. Patient has medical history of BPH, CKD stage III. Most recent discharge from our facility on 21. ECHO was normal. Neurology was consulted. MRI Cervical showed mild central canal stenosis, but nothing serious. He was orthostatic positive.  It was recommended to stop Flomax but the patient and wife wanted to talk to Urology first. EMG has been scheduled at [address] for this Monday at [address]. Transportation will need to be arranged so he arrives no later than 730 am.    Vitals - stable    Labs- benign  Past Medical History:   Diagnosis Date    Chronic kidney disease     Elevated PSA     Hearing difficulty     Hepatitis A 2020    Hypercholesterolemia     Kidney stone         Past Surgical History:   Procedure Laterality Date    HX COLONOSCOPY  2016        Family History   Problem Relation Age of Onset    No Known Problems Mother     Cancer Father     No Known Problems Maternal Grandmother     No Known Problems Maternal Grandfather     No Known Problems Paternal Grandmother     No Known Problems Paternal Grandfather        Social History     Social History Narrative    Not on file        Social History     Tobacco Use    Smoking status: Never Smoker    Smokeless tobacco: Never Used   Substance Use Topics    Alcohol use: No     Alcohol/week: 0.0 standard drinks        Social History     Substance and Sexual Activity   Drug Use Never         Allergies   Allergen Reactions    Augmentin [Amoxicillin-Pot Clavulanate] Nausea and Vomiting       Prior to Admission medications    Medication Sig Start Date End Date Taking? Authorizing Provider   finasteride (PROSCAR) 5 mg tablet Take 1 Tablet by mouth daily. TAKE ONE TABLET BY MOUTH EVERY DAY 9/23/21   Cheikh Esquivel MD   tamsulosin (FLOMAX) 0.4 mg capsule Take 1 Capsule by mouth daily. 9/23/21   Cheikh Esquivel MD   predniSONE (DELTASONE) 20 mg tablet Take 20 mg by mouth two (2) times a day for 5 days. 9/23/21 9/28/21  Cheikh Esquivel MD   aspirin 81 mg chewable tablet Take 1 Tablet by mouth daily. 8/15/21   Lima Osgood,            Review of Systems    Constitutional:NAD  Eyes:  no change in visual acuity, no photophobia  Ears, nose, mouth, throat, and face: no  Odynphagia, dysphagia, no thrush or exudate, negative for chronic sinus congestion, recurrent headaches  Respiratory: negative for SOB, hemoptysis or cough  Cardiovascular: negative for CP, palpitations, or PND  Gastrointestinal: negative for abdominal pain, no hematemesis, hematochezia or BRBPR  Genitourinary: no urgency, frequency, or dysuria, no nocturia  Integument/breast: negative for skin rash or skin lesions  Hematologic/lymphatic: negative for known bleeding disorder  Musculoskeletal:negative for joint pain or joint tenderness  Neurological: generalized weakness, difficulty with speech.    Behavioral/Psych: negative for depression or chronic anxiety,   Endocrine: negative for polydyspia, polyuria or intolerance to heat or cold  Allergic/Immunologic: negative for chronic allergic rhinitis, or known connective tissue disorder      Objective:     Patient Vitals for the past 24 hrs:   Temp Pulse Resp BP SpO2   09/23/21 1535    (!) 141/66    09/23/21 1442     97 %   09/23/21 1425     98 %   09/23/21 1424    (!) 162/74 97 %   09/23/21 1423    129/63 (!) 87 %   09/23/21 1422    (!) 169/81 96 %   09/23/21 1421     (!) 83 %   09/23/21 1418 97.8 °F (36.6 °C) 86 18 122/67 96 %        No intake/output data recorded. No intake/output data recorded. Data Review:   Recent Results (from the past 24 hour(s))   CBC WITH AUTOMATED DIFF    Collection Time: 09/23/21  2:19 PM   Result Value Ref Range    WBC 9.9 4.3 - 11.1 K/uL    RBC 4.67 4.23 - 5.6 M/uL    HGB 14.3 13.6 - 17.2 g/dL    HCT 43.0 41.1 - 50.3 %    MCV 92.1 79.6 - 97.8 FL    MCH 30.6 26.1 - 32.9 PG    MCHC 33.3 31.4 - 35.0 g/dL    RDW 11.8 (L) 11.9 - 14.6 %    PLATELET 187 651 - 630 K/uL    MPV 9.6 9.4 - 12.3 FL    ABSOLUTE NRBC 0.00 0.0 - 0.2 K/uL    DF AUTOMATED      NEUTROPHILS 74 43 - 78 %    LYMPHOCYTES 17 13 - 44 %    MONOCYTES 8 4.0 - 12.0 %    EOSINOPHILS 0 (L) 0.5 - 7.8 %    BASOPHILS 0 0.0 - 2.0 %    IMMATURE GRANULOCYTES 0 0.0 - 5.0 %    ABS. NEUTROPHILS 7.3 1.7 - 8.2 K/UL    ABS. LYMPHOCYTES 1.7 0.5 - 4.6 K/UL    ABS. MONOCYTES 0.8 0.1 - 1.3 K/UL    ABS. EOSINOPHILS 0.0 0.0 - 0.8 K/UL    ABS. BASOPHILS 0.0 0.0 - 0.2 K/UL    ABS. IMM. GRANS. 0.0 0.0 - 0.5 K/UL   METABOLIC PANEL, COMPREHENSIVE    Collection Time: 09/23/21  2:19 PM   Result Value Ref Range    Sodium 140 136 - 145 mmol/L    Potassium 4.1 3.5 - 5.1 mmol/L    Chloride 105 98 - 107 mmol/L    CO2 29 21 - 32 mmol/L    Anion gap 6 (L) 7 - 16 mmol/L    Glucose 101 (H) 65 - 100 mg/dL    BUN 17 8 - 23 MG/DL    Creatinine 1.20 0.8 - 1.5 MG/DL    GFR est AA >60 >60 ml/min/1.73m2    GFR est non-AA >60 >60 ml/min/1.73m2    Calcium 9.4 8.3 - 10.4 MG/DL    Bilirubin, total 0.4 0.2 - 1.1 MG/DL    ALT (SGPT) 33 12 - 65 U/L    AST (SGOT) 28 15 - 37 U/L    Alk.  phosphatase 71 50 - 136 U/L    Protein, total 7.9 6.3 - 8.2 g/dL    Albumin 3.6 3.2 - 4.6 g/dL    Globulin 4.3 (H) 2.3 - 3.5 g/dL    A-G Ratio 0.8 (L) 1.2 - 3.5     MAGNESIUM    Collection Time: 09/23/21  2:19 PM   Result Value Ref Range    Magnesium 2.2 1.8 - 2.4 mg/dL   TSH 3RD GENERATION    Collection Time: 09/23/21  2:19 PM   Result Value Ref Range    TSH 1.800 0.358 - 3.740 uIU/mL   C REACTIVE PROTEIN, QT    Collection Time: 09/23/21  2:19 PM   Result Value Ref Range    C-Reactive protein 0.7 0.0 - 0.9 mg/dL   SED RATE, AUTOMATED    Collection Time: 09/23/21  2:19 PM   Result Value Ref Range    Sed rate, automated 32 (H) 0 - 20 mm/hr       Physical Exam:     General:  Alert, cooperative, slow to answer but can answer appropriately. Hoarse. Eyes:  Conjunctivae/corneas clear. PERRL, EOMs intact. Fundi benign   Ears:  Normal TMs and external ear canals both ears. Nose: Nares normal. Septum midline. Mucosa normal. No drainage or sinus tenderness. Mouth/Throat: Lips, mucosa, and tongue normal. Teeth and gums normal.   Neck: no JVD. Back:   deferred   Lungs:   Clear to auscultation bilaterally. Heart:  Regular rate and rhythm, S1, S2 normal   Abdomen:   Soft, non-tender. Bowel sounds normal. No masses,  No organomegaly. Extremities: Slow to move, right UE 3/5 strength. Limited ROM. Pulses: 2+ and symmetric all extremities. Skin: Skin color, texture, turgor normal. No rashes or lesions   Lymph nodes: Cervical, supraclavicular, and axillary nodes normal.   Neurologic: As above      Assessment and Plan     Principal Problem:    Inability to walk (9/23/2021)    Active Problems:    CKD (chronic kidney disease) stage 3, GFR 30-59 ml/min ([facility] [address] (12/2/2019)      Benign prostatic hyperplasia with nocturia (1/23/2020)      Multiple falls (8/12/2021)      Weakness generalized (9/23/2021)      Hoarseness (9/23/2021)    Difficulty with speech, motor neurons - Concerned of ALS or another motor neuron disease. MRI lumbar and thoracic spine has been ordered. Has EMG scheduled for this Monday. Neurology following to which I appreciate. Consult speech therapy to repeat swallow eval    ASA    Holding BPH medications since with hx of orthostatic BP    [facility] 17:26[facility] [place]. Accepting provider Dr. Lidya Paiz.      DVT prophylaxis - SCDs    Signed By: Justine Reilly Augustus Portillo,    September 23, 2021

## 2021-09-23 NOTE — ED PROVIDER NOTES
73-year-old male with a history of high cholesterol, chronic kidney disease, hepatitis C, kidney stones presents with persistent right arm weakness since last admission in August for possible stroke. That work-up was unremarkable. He was also seen at [facility] emergency department September 20 for the same. Work-up was again negative. Wife reported difficulty speaking and walking that seemed to be worsening. Primary care physician sent him to the emergency department for another evaluation today and evaluation by neurology. Patient states he does not know why he is here, his PCP sent him in. He denies any new difficulty walking or new falls. No n/v/d, fever, headache. He does report neck pain. Denies numbness. Last admission 8/11-14:    \"Hospital Course:  Please refer to the admission H&P for details of presentation. In summary, the patient is a 78 y. o. male with medical history of BPH, CKD stage III who presented to ED his PCPs office due to multiple falls within 2 to 3 weeks.  Per wife at bed side patient had fallen about 3 times since 2 weeks ago with last fall yesterday evening.  Reports she has noticed that he has been getting weaker on his right hand and unable to feed himself he normally would. Eddye Dolin also have intermittent slurred speech with word finding difficulty.  Had loss of consciousness or head trauma during his falls.  Reports that his gait seemed to be unsteady. He is hard of hearing but appears to be alert and oriented x3.  Reports that he is in normal state of health without any fever, chills, shortness of breath, chest pain, abdominal pain, nausea, vomiting except for recent multiple falls.  In the ED, patient was noted to be hypertensive to 225/123, saturating well on room air. Laboratory work up is unremarkable. CT head without acute intracranial abnormalities.  Chest x-ray with no acute cardiopulmonary disease but showed right upper lobe nodule which appeared to be present on prior PET scan. He was admitted for stroke rule out. MRI Brain only showed chronic microvascular disease. ECHO was normal. Neurology was consulted. MRI Cervical showed mild central canal stenosis, but nothing serious. He was orthostatic positive. It was recommended to stop Flomax but the patient and wife wanted to talk to Urology first. He remained stable and was discharged home. He was instructed to drink ice cold water every morning and use safety precautions when standing. He will get an outpatient EMG. \"         Lethargy  Pertinent negatives include no chest pain, no abdominal pain, no headaches and no shortness of breath.         Past Medical History:   Diagnosis Date    Chronic kidney disease     Elevated PSA     Hearing difficulty     Hepatitis A 2020    Hypercholesterolemia     Kidney stone        Past Surgical History:   Procedure Laterality Date    HX COLONOSCOPY  05/18/2016         Family History:   Problem Relation Age of Onset    No Known Problems Mother     Cancer Father     No Known Problems Maternal Grandmother     No Known Problems Maternal Grandfather     No Known Problems Paternal Grandmother     No Known Problems Paternal Grandfather        Social History     Socioeconomic History    Marital status:      Spouse name: Not on file    Number of children: Not on file    Years of education: Not on file    Highest education level: Not on file   Occupational History    Not on file   Tobacco Use    Smoking status: Never Smoker    Smokeless tobacco: Never Used   Vaping Use    Vaping Use: Never used   Substance and Sexual Activity    Alcohol use: No     Alcohol/week: 0.0 standard drinks    Drug use: Never    Sexual activity: Never   Other Topics Concern    Not on file   Social History Narrative    Not on file     Social Determinants of Health     Financial Resource Strain:     Difficulty of Paying Living Expenses:    Food Insecurity:     Worried About Running Out of Food in the Last Year:     Ran Out of Food in the Last Year:    Transportation Needs:     Lack of Transportation (Medical):  Lack of Transportation (Non-Medical):    Physical Activity:     Days of Exercise per Week:     Minutes of Exercise per Session:    Stress:     Feeling of Stress :    Social Connections:     Frequency of Communication with Friends and Family:     Frequency of Social Gatherings with Friends and Family:     Attends [employer] Services:     Active Member of Clubs or Organizations:     Attends Club or Organization Meetings:     Marital Status:    Intimate Partner Violence:     Fear of Current or Ex-Partner:     Emotionally Abused:     Physically Abused:     Sexually Abused: ALLERGIES: Augmentin [amoxicillin-pot clavulanate]    Review of Systems   Constitutional: Positive for fatigue. Negative for fever. HENT: Negative for hearing loss. Eyes: Negative for visual disturbance. Respiratory: Negative for cough and shortness of breath. Cardiovascular: Negative for chest pain. Gastrointestinal: Negative for abdominal pain, diarrhea, nausea and vomiting. Musculoskeletal: Positive for neck pain. Negative for back pain. Skin: Negative for rash. Neurological: Positive for weakness. Negative for numbness and headaches. Psychiatric/Behavioral: Negative for confusion. All other systems reviewed and are negative. Vitals:    09/23/21 1422 09/23/21 1423 09/23/21 1424 09/23/21 1425   BP: (!) 169/81 129/63 (!) 162/74    Pulse:       Resp:       Temp:       SpO2: 96% (!) 87% 97% 98%            Physical Exam  Vitals and nursing note reviewed. Constitutional:       Appearance: Normal appearance. He is well-developed. HENT:      Head: Normocephalic and atraumatic. Nose: Nose normal.      Mouth/Throat:      Mouth: Mucous membranes are moist.   Eyes:      Pupils: Pupils are equal, round, and reactive to light. Cardiovascular:      Rate and Rhythm: Regular rhythm.       Heart sounds: Normal heart sounds. Pulmonary:      Effort: Pulmonary effort is normal.      Breath sounds: Normal breath sounds. Abdominal:      Palpations: Abdomen is soft. Tenderness: There is no abdominal tenderness. Musculoskeletal:         General: No deformity. Normal range of motion. Cervical back: Normal range of motion and neck supple. Skin:     General: Skin is warm and dry. Neurological:      General: No focal deficit present. Mental Status: He is alert. Mental status is at baseline. Cranial Nerves: Cranial nerves are intact. Sensory: Sensation is intact. Motor: Weakness and abnormal muscle tone present. Deep Tendon Reflexes:      Reflex Scores:       Bicep reflexes are 3+ on the right side and 3+ on the left side. Patellar reflexes are 3+ on the right side and 3+ on the left side. Comments: Right arm weakness and spasticity   Psychiatric:         Mood and Affect: Mood normal.         Behavior: Behavior normal.          MDM  Number of Diagnoses or Management Options  Weakness  Diagnosis management comments: Parts of this document were created using dragon voice recognition software. The chart has been reviewed but errors may still be present. I wore appropriate PPE throughout this patient's ED visit. Isaiah Alan MD, 3:10 PM    Evaluated by neurology, Dr. Luz Cantu. Did not recommend any further imaging at this time. Recommended labs, orthostatic vital signs, and hospitalist admission.        Amount and/or Complexity of Data Reviewed  Clinical lab tests: reviewed and ordered (Results for orders placed or performed during the hospital encounter of 09/23/21  -CBC WITH AUTOMATED DIFF       Result                      Value             Ref Range           WBC                         9.9               4.3 - 11.1 K*       RBC                         4.67              4.23 - 5.6 M*       HGB                         14.3              13.6 - 17.2 *       HCT 43.0              41.1 - 50.3 %       MCV                         92.1              79.6 - 97.8 *       MCH                         30.6              26.1 - 32.9 *       MCHC                        33.3              31.4 - 35.0 *       RDW                         11.8 (L)          11.9 - 14.6 %       PLATELET                    349               150 - 450 K/*       MPV                         9.6               9.4 - 12.3 FL       ABSOLUTE NRBC               0.00              0.0 - 0.2 K/*       DF                          AUTOMATED                             NEUTROPHILS                 74                43 - 78 %           LYMPHOCYTES                 17                13 - 44 %           MONOCYTES                   8                 4.0 - 12.0 %        EOSINOPHILS                 0 (L)             0.5 - 7.8 %         BASOPHILS                   0                 0.0 - 2.0 %         IMMATURE GRANULOCYTES       0                 0.0 - 5.0 %         ABS. NEUTROPHILS            7.3               1.7 - 8.2 K/*       ABS. LYMPHOCYTES            1.7               0.5 - 4.6 K/*       ABS. MONOCYTES              0.8               0.1 - 1.3 K/*       ABS. EOSINOPHILS            0.0               0.0 - 0.8 K/*       ABS. BASOPHILS              0.0               0.0 - 0.2 K/*       ABS. IMM.  GRANS.            0.0               0.0 - 0.5 K/*  -METABOLIC PANEL, COMPREHENSIVE       Result                      Value             Ref Range           Sodium                      140               136 - 145 mm*       Potassium                   4.1               3.5 - 5.1 mm*       Chloride                    105               98 - 107 mmo*       CO2                         29                21 - 32 mmol*       Anion gap                   6 (L)             7 - 16 mmol/L       Glucose                     101 (H)           65 - 100 mg/*       BUN                         17                8 - 23 MG/DL Creatinine                  1.20              0.8 - 1.5 MG*       GFR est AA                  >60               >60 ml/min/1*       GFR est non-AA              >60               >60 ml/min/1*       Calcium                     9.4               8.3 - 10.4 M*       Bilirubin, total            0.4               0.2 - 1.1 MG*       ALT (SGPT)                  33                12 - 65 U/L         AST (SGOT)                  28                15 - 37 U/L         Alk.  phosphatase            71                50 - 136 U/L        Protein, total              7.9               6.3 - 8.2 g/*       Albumin                     3.6               3.2 - 4.6 g/*       Globulin                    4.3 (H)           2.3 - 3.5 g/*       A-G Ratio                   0.8 (L)           1.2 - 3.5      -MAGNESIUM       Result                      Value             Ref Range           Magnesium                   2.2               1.8 - 2.4 mg*  -TSH 3RD GENERATION       Result                      Value             Ref Range           TSH                         1.800             0.358 - 3.74*  -C REACTIVE PROTEIN, QT       Result                      Value             Ref Range           C-Reactive protein          0.7               0.0 - 0.9 mg*  -SED RATE, AUTOMATED       Result                      Value             Ref Range           Sed rate, automated         32 (H)            0 - 20 mm/hr   )           Procedures

## 2021-09-23 NOTE — PROGRESS NOTES
I contacted the outpatient neurology office regarding coordination of expedited EMG/NCV. This has bee scheduled for 0800 on Monday Sept 27th at the [facility], [address].  Transportation will need to be arranged so he arrives no later than 730 am.      Signed By: Sofía Roa MD     September 23, 2021

## 2021-09-23 NOTE — ED TRIAGE NOTES
Pt to triage in wheelchair with mask in place. Pt has difficulty communicating and is hard of hearing. Per pt's wife for x4 weeks pt has not been able to walk and has difficulty speaking. States pt was hospitalized for these symptoms and worked up for possible CVA. Pt's wife states that his symptoms have not improved and when they followed up with his PCP Dr. Ignacio Sotelo this AM they were told to come to the ED. Per EMR, note from Dr. Andrae Dubon,   \"Discussed situation with Dr. 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 Carmelita Caro who has seen him before. From the description by Dr. Ignacio Sotelo he clearly needs CSF. \"

## 2021-09-23 NOTE — CONSULTS
Consults by Brandy Thompson MD at 09/23/21 1520    Author: Brandy Thompson MD Service: Neurology Author Type: Physician   Filed: 09/23/21 [id number] Date of Service: 09/23/21 1520 Status: Incomplete   : Brandy Thompson MD (Physician)   Expand AllCollapse All      []Hide copied text    []Hover for details          Consult     Patient: Claire Ramírez MRN: [id number]  SSN: [id number]    YOB: 1940  Age: 78 y.o. Sex: male          Assessment:      Progressive gait dysfunction, hoarseness, diffuse hyper-reflexia, mild RUE weakness and increased motor tone in 4 extremities. Previous evaluation demonstrated mid cervical spinal central canal stenosis that has been interpreted as mild by radiology but looks more than mild to me. Barium swallow demonstrated aspiration of thin liquids.      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     Consider spine surgery consultaiton     I will try to coordinate EMG while in patient     Orthostatic BP and pulse     Subjective: Claire Ramírez is a 78 y.o. male who is being seen for falls and weakness previously seen in mid August. Findings at that time suggestive of mild myelopathy. B12 >600. MRI showed central canal narrowing midcervical. Plan was for outpatient EMG and ortho spine surgery consult.     Also patient found to be orthostatic with 20 point BP drop and to have aspiration of thin liquids.     Since DC has worsened slightly in all aspects according to his wife.  Minimally ambulatory with walker and standby assist. Seen at PCP today and directed to ED by Dr. Erik Mckenna.                Past Medical History:   Diagnosis Date    Chronic kidney disease      Elevated PSA      Hearing difficulty      Hepatitis A 2020    Hypercholesterolemia      Kidney stone              Past Surgical History:   Procedure Laterality Date    HX COLONOSCOPY   05/18/2016            Family History   Problem Relation Age of Onset    No Known Problems Mother      Cancer Father      No Known Problems Maternal Grandmother      No Known Problems Maternal Grandfather      No Known Problems Paternal Grandmother      No Known Problems Paternal Grandfather        Social History            Tobacco Use    Smoking status: Never Smoker    Smokeless tobacco: Never Used   Substance Use Topics    Alcohol use: No       Alcohol/week: 0.0 standard drinks             Current Outpatient Medications   Medication Sig Dispense Refill    finasteride (PROSCAR) 5 mg tablet Take 1 Tablet by mouth daily. TAKE ONE TABLET BY MOUTH EVERY DAY 90 Tablet 1    tamsulosin (FLOMAX) 0.4 mg capsule Take 1 Capsule by mouth daily. 90 Capsule 1    predniSONE (DELTASONE) 20 mg tablet Take 20 mg by mouth two (2) times a day for 5 days. 10 Tablet 0    aspirin 81 mg chewable tablet Take 1 Tablet by mouth daily.  30 Tablet 2              Allergies   Allergen Reactions    Augmentin [Amoxicillin-Pot Clavulanate] Nausea and Vomiting         Review of Systems: Review of Systems - General ROS: negative  Psychological ROS: negative  Ophthalmic ROS: negative  ENT ROS: negative  Allergy and Immunology ROS: negative  Hematological and Lymphatic ROS: negative  Endocrine ROS: negative  Respiratory ROS: no cough, shortness of breath, or wheezing  Cardiovascular ROS: no chest pain or dyspnea on exertion  Gastrointestinal ROS: no abdominal pain, change in bowel habits, or black or bloody stools  Genito-Urinary ROS: no dysuria, trouble voiding, or hematuria  Musculoskeletal ROS: negative  Neurological ROS: see hpi  Dermatological ROS: negative           Objective:             Vitals:     09/23/21 1422 09/23/21 1423 09/23/21 1424 09/23/21 1425   BP: (!) 169/81 129/63 (!) 162/74     Pulse:           Resp:           Temp:           SpO2: 96% (!) 87% 97% 98%    Physical Exam:        General: well nourished, appears stated age     Eyes: no proptosis or exophthalmos; conjunctivae clear, sclerae non-icteric     Chest: clear to auscultation     Cardiac: normal S1 S2; no murmurs gallop or rubs     Neurological:     MSE: alert, oriented times 3;dysarthric  Speech;? Spasmodic dysphonia:  no paraphasic errors; follows commands without difficulty     CN 2: visual fields full; no afferent pupillary defect; VA not checked; fundoscopic exam ... CN 3,4,6: Pupils symmetrical in size, reactive to light directly and consensually; no ptosis; full versions and ductions  CN 5: facial sensation intact to light touch and pin prick. Corneal reflex . ..   CN 7: Symmetrical facial tone and movements  CN 8 responds to spoken voice  CN 9,10; palate symmetrical gag intact  CN 11: head turn and shoulder shrug intact  CN 12: tongue midline without atrophy or fasiculations     Motor:  Power 5-/5 UE and LE proximal to distal  Fine motor movements symmetrical  Tone diffusely inceased  Atrophy: absent but patient was full clothed in ED moore bed and I was unable to adequately assess for atrophy or fasiculations  Gait: unable to assess     Cerebellar:  No gross tremor     Sensory     Intact to primary modalities in all 4 extremities     Reflexes     Diffusely hyper reflexic  Plantar response flexor left and possible extensor right     Recent Results   No results found for this or any previous visit (from the past 24 hour(s)).              Lab Results   Component Value Date/Time     Cholesterol, total 131 08/12/2021 05:31 AM     HDL Cholesterol 32 (L) 08/12/2021 05:31 AM     LDL, calculated 81.4 08/12/2021 05:31 AM     VLDL, calculated 17.6 08/12/2021 05:31 AM     Triglyceride 88 08/12/2021 05:31 AM     CHOL/HDL Ratio 4.1 08/12/2021 05:31 AM               Lab Results   Component Value Date/Time     Hemoglobin A1c 5.6 08/12/2021 05:31 AM         CT Results (most recent):  Results from [facility] [place] on [place]/11/21     CTA HEAD NECK W WO CONT     Narrative  Title:  CT arteriogram of the neck and head.     Indication: Right arm weakness. Ataxia. Stroke like symptoms. .     Technique: Axial images of the neck and head were obtained after the uneventful  administration of intravenous iodinated contrast media. Contrast was used to  best identify the arterial structures. Images were reviewed on a separate, free  standing, three-dimensional workstation as per the referring physicians request.        All stenosis percentages derived by comparing the narrowest segment with the  distal Internal Carotid Artery luminal diameter, as described in the [place]  American Symptomatic Carotid Endarterectomy Trial (NASCET) criteria.     All CT scans at this facility are performed using dose reduction/dose modulation  techniques, as appropriate the performed exam, including the following:  Automated Exposure Control; Adjustment of the mA and/or kV according to patient  size (this includes techniques or standardized protocols for targeted exams  where dose is matched to indication/reason for exam); and Use of Iterative  Reconstruction Technique.     Comparison: None.     Findings:     Lungs:  No focal consolidation or pleural effusions. No suspicious pulmonary  nodules. .  Bones:  No osseous destruction. Moderate multilevel degenerative changes in the  cervical spine. This most pronounced at C3-C4 where there is a prominent  posterior disc osteophyte complex which appears to cause spinal canal narrowing. Paranasal sinuses:  Paranasal sinuses are clear. .  Brain:  No midline shift. No enhancing mass lesion or hydrocephalus. .  Soft tissues:  Within normal limits. .     Dural venous sinuses:  Patent.     Aortic arch:  Non-aneurysmal. Arch vessels are patent. Moderate atherosclerotic  changes are present.     ANTERIOR CIRCULATION     Right common carotid artery:  No significant stenosis or occlusion.   Right internal carotid artery: No significant stenosis or occlusion. Moderate  atherosclerotic changes along the right carotid bulb without significant  stenosis. Vascular calcifications are present along the carotid siphon.     Right middle cerebral artery:  No significant stenosis, occlusion, or aneurysm. Right anterior cerebral artery:  No significant stenosis, occlusion, or  aneurysm.     Left common carotid artery: No significant stenosis or occlusion. Left internal carotid artery:  No significant stenosis or occlusion. Mild to  moderate atherosclerotic changes of the left carotid bulb without significant  stenosis. As the calcifications are present along the carotid siphon.     Left middle cerebral artery:  No significant stenosis, occlusion, or aneurysm. Left anterior cerebral artery:  No significant stenosis, occlusion, or aneurysm.     Anterior communicating artery: No significant stenosis, occlusion, or aneurysm.     POSTERIOR CIRCULATION     Right vertebral artery:  No significant stenosis or occlusion. Left vertebral artery:  No significant stenosis or occlusion. Basilar artery:  No significant stenosis, occlusion, or aneurysm.     Right posterior communicating artery: No significant stenosis, occlusion, or  aneurysm. Left posterior communicating artery:  No significant stenosis, occlusion, or  aneurysm.     Right posterior cerebral artery:  No significant stenosis, occlusion, or  aneurysm. Fetal type right PCA. Left posterior cerebral artery:  No significant stenosis, occlusion, or  aneurysm.     Impression  No acute large vessel occlusion or high-grade stenosis.  Moderate  atherosclerotic changes.               Results for orders placed or performed during the hospital encounter of 08/11/21   EKG, 12 LEAD, INITIAL   Result Value Ref Range     Ventricular Rate 67 BPM     Atrial Rate 67 BPM     P-R Interval 174 ms     QRS Duration 74 ms     Q-T Interval 388 ms     QTC Calculation (Bezet) 409 ms     Calculated P Axis 53 degrees   Calculated R Axis 80 degrees     Calculated T Axis 65 degrees     Diagnosis           Normal sinus rhythm  Low voltage QRS  Cannot rule out Septal infarct , age undetermined  Abnormal ECG  No previous ECGs available  Confirmed by Denis Starr ([id number]) on 8/11/2021 5:09:49 PM            MRI Results (most recent):  Results from [place] encounter on 08/11/21     MRI CERV SPINE WO CONT     Narrative  MRI cervical spine without contrast:     HISTORY: - 78year old here for c-spine MRI without contrast-- right arm  weakness-- unsteadiness, falls-- no history of cancer--     Imaging sequences: Multiplanar multisequence imaging was performed on a 1.5  Rosi magnet.     COMPARISON: None.     FINDINGS: The vertebral body height alignment are well-maintained. The disc  space heights are well-preserved.     C2-3: Negative     C3-4: There is a shallow disc-osteophyte complex. There is mild central canal  stenosis. Uncovertebral joint spurring causes moderate right neural foraminal  narrowing. The left neural foramen is patent.     C4-5: There is a disc-osteophyte complex causing mild central canal stenosis. The neural foramen are patent.     C5-6: There is a shallow disc-osteophyte complex without central canal stenosis. The neural foramen are patent.     C6-7: Negative     C7-T1: Negative     The cervical cord is normal in signal intensity and morphology. The marrow is  normal in signal intensity.     Impression  1. Cervical spondylosis resulting in mild central canal stenosis at C3-4 and  C4-5.  2. Moderate right neural foraminal narrowing at C3-4.        US Results (most recent):  No results found for this or any previous visit.        Most recent CTA  Results from Hospital Encounter encounter on 08/11/21     CTA HEAD NECK W WO CONT     Narrative  Title:  CT arteriogram of the neck and head.     Indication: Right arm weakness. Ataxia. Stroke like symptoms. .     Technique: Axial images of the neck and head were obtained after the uneventful  administration of intravenous iodinated contrast media. Contrast was used to  best identify the arterial structures. Images were reviewed on a separate, free  standing, three-dimensional workstation as per the referring physicians request.        All stenosis percentages derived by comparing the narrowest segment with the  distal Internal Carotid Artery luminal diameter, as described in the [place]  American Symptomatic Carotid Endarterectomy Trial (NASCET) criteria.     All CT scans at this facility are performed using dose reduction/dose modulation  techniques, as appropriate the performed exam, including the following:  Automated Exposure Control; Adjustment of the mA and/or kV according to patient  size (this includes techniques or standardized protocols for targeted exams  where dose is matched to indication/reason for exam); and Use of Iterative  Reconstruction Technique.     Comparison: None.     Findings:     Lungs:  No focal consolidation or pleural effusions. No suspicious pulmonary  nodules. .  Bones:  No osseous destruction. Moderate multilevel degenerative changes in the  cervical spine. This most pronounced at C3-C4 where there is a prominent  posterior disc osteophyte complex which appears to cause spinal canal narrowing. Paranasal sinuses:  Paranasal sinuses are clear. .  Brain:  No midline shift. No enhancing mass lesion or hydrocephalus. .  Soft tissues:  Within normal limits. .     Dural venous sinuses:  Patent.     Aortic arch:  Non-aneurysmal. Arch vessels are patent. Moderate atherosclerotic  changes are present.     ANTERIOR CIRCULATION     Right common carotid artery:  No significant stenosis or occlusion. Right internal carotid artery:  No significant stenosis or occlusion. Moderate  atherosclerotic changes along the right carotid bulb without significant  stenosis.  Vascular calcifications are present along the carotid siphon.     Right middle cerebral artery:  No significant stenosis, occlusion, or aneurysm. Right anterior cerebral artery:  No significant stenosis, occlusion, or  aneurysm.     Left common carotid artery: No significant stenosis or occlusion. Left internal carotid artery:  No significant stenosis or occlusion. Mild to  moderate atherosclerotic changes of the left carotid bulb without significant  stenosis. As the calcifications are present along the carotid siphon.     Left middle cerebral artery:  No significant stenosis, occlusion, or aneurysm. Left anterior cerebral artery:  No significant stenosis, occlusion, or aneurysm.     Anterior communicating artery: No significant stenosis, occlusion, or aneurysm.     POSTERIOR CIRCULATION     Right vertebral artery:  No significant stenosis or occlusion. Left vertebral artery:  No significant stenosis or occlusion. Basilar artery:  No significant stenosis, occlusion, or aneurysm.     Right posterior communicating artery: No significant stenosis, occlusion, or  aneurysm. Left posterior communicating artery:  No significant stenosis, occlusion, or  aneurysm.     Right posterior cerebral artery:  No significant stenosis, occlusion, or  aneurysm. Fetal type right PCA. Left posterior cerebral artery:  No significant stenosis, occlusion, or  aneurysm.     Impression  No acute large vessel occlusion or high-grade stenosis. Moderate  atherosclerotic changes.        Most recent MRI  Results from Hospital Encounter encounter on 08/11/21     MRI CERV SPINE WO CONT     Narrative  MRI cervical spine without contrast:     HISTORY: - 78year old here for c-spine MRI without contrast-- right arm  weakness-- unsteadiness, falls-- no history of cancer--     Imaging sequences: Multiplanar multisequence imaging was performed on a 1.5  Rosi magnet.     COMPARISON: None.     FINDINGS: The vertebral body height alignment are well-maintained.  The disc  space heights are well-preserved.     C2-3: Negative     C3-4: There is a shallow disc-osteophyte complex. There is mild central canal  stenosis. Uncovertebral joint spurring causes moderate right neural foraminal  narrowing. The left neural foramen is patent.     C4-5: There is a disc-osteophyte complex causing mild central canal stenosis. The neural foramen are patent.     C5-6: There is a shallow disc-osteophyte complex without central canal stenosis. The neural foramen are patent.     C6-7: Negative     C7-T1: Negative     The cervical cord is normal in signal intensity and morphology. The marrow is  normal in signal intensity.     Impression  1.  Cervical spondylosis resulting in mild central canal stenosis at C3-4 and  C4-5.  2. Moderate right neural foraminal narrowing at C3-4.              Signed By: Raegan Pimentel MD      September 23, 2021

## 2021-09-23 NOTE — ED NOTES
TRANSFER - OUT REPORT:    Verbal report given to [facility], RN (name) on [facility]  being transferred to [address] [place] [address] (unit) for routine progression of care       Report consisted of patients Situation, Background, Assessment and   Recommendations(SBAR). Information from the following report(s) ED Summary was reviewed with the receiving nurse. Lines:   Peripheral IV 09/23/21 Left Antecubital (Active)        Opportunity for questions and clarification was provided.       Patient transported with:  [facility]

## 2021-09-24 NOTE — PROGRESS NOTES
Care Management Interventions  Mode of Transport at Discharge: Self  Transition of Care Consult (CM Consult): Home Health (current with [facility])  Support Systems: Spouse/Significant Other  Confirm Follow Up Transport: Other (see comment) (stretcher)  The Plan for Transition of Care is Related to the Following Treatment Goals : improve mobility  The Patient and/or Patient Representative was Provided with a Choice of Provider and Agrees with the Discharge Plan?: Yes  Freedom of Choice List was Provided with Basic Dialogue that Supports the Patient's Individualized Plan of Care/Goals, Treatment Preferences and Shares the Quality Data Associated with the Providers?: Yes  Discharge Location  Discharge Placement: Skilled nursing facility    I met with patient and spouse to discuss d/c plans. Pt lives with wife and is current with [facility]. Patient difficult to understand due to garbled speech. Wife did most of the talking and states he has declined since his last hospitalization this summer. They declined rehab offer and went home with [facility] but wife thinks that was a mistake. She now wants skilled rehab on d/c. We discussed briefly the possibility that this may be a new baseline. Speech had seen him with [facility] but was limited as to the number of visits. Pt/wife given area NH list. They are requesting 1. [facility] (referral sent)  2. [facility] (referral sent),  3. [address] in [place]. PPD and COVID ordered.

## 2021-09-24 NOTE — PROGRESS NOTES
Hospitalist Progress Note   Admit Date:  2021  7:29 PM   Name:  José Miguel Marmolejo   Age:  78 y.o. Sex:  male  :  1940   MRN:  [id number]   Room:  [facility]/    Presenting Complaint: No chief complaint on file. Initial Admission Diagnosis: Inability to walk [R26.2]  Hoarseness [R49.0]     Assessment and Plan:   # Progressive weakness   - Seen by Neurology at [facility] ER and suspicious for motor neuron disease. MRI C-spine was reviewed, T/L-spine MRIs are pending. Seen by PT/OT/ST. Has EMG scheduled for  at [address] Neurology office. Appreciate Neurology help. Diet per speech, may need repeat MBS. Discharge Planning: Placement. Diet:  ADULT DIET Dysphagia - Minced & Moist; Mildly Thick (Nectar); Liquids by Spoon only  DVT PPx: SCDs  Code status: DNR    Hospital Problems as of 2021 Date Reviewed: 2021        Codes Class Noted - Resolved POA    Hoarseness ICD-10-CM: R49.0  ICD-9-CM: 784.42  2021 - Present Yes        * (Principal) Inability to walk ICD-10-CM: R26.2  ICD-9-CM: 719.7  2021 - Present Yes              Hospital Course:   Mr. Alex Carbajal is a nice 79 y/o WM who presented to the ER at [facility] on  with ongoing R sided weakness, hoarse voice and trouble ambulating. He was hospitalized last month and had an unremarkable CVA work up. He was discharged home. Went to a [facility] ER recently and ultimately discharged from there. He saw his PCP on  due to ongoing weakness, drooling and difficulty with speech. His PCP called and spoke to Dr. Jose Cleaning who advised that he go to the ER for further evaluation. He was seen by Neurology with concerns for motor neuron disease. C-spine MRI showed mild central canal narrowing w/o cord signal changes. Since no beds were available at [facility] he was transferred to [facility] for further management. Thoracolumbar MRIs are pending. PT/OT have been consulted and per Neurology he has an EMG scheduled at 2pm on  at the HOSP[facility] [address] Neuro office.      24hr Events/Subjective (09/24/21):   9/24: Patient up to chair, wife at bedside. Voice is hoarse, he thinks it's related to sinus drainage. Still very weak, R>L. Wife says he has a hard time getting around at home. He denies chest pain, SOB, N/V/D, abdominal pain. Objective:     Patient Vitals for the past 24 hrs:   Temp Pulse Resp BP SpO2   09/24/21 1100 97.6 °F (36.4 °C) 67 20 125/69 95 %   09/24/21 0700 98.4 °F (36.9 °C) 76 18 (!) 159/78 95 %   09/23/21 2307 98 °F (36.7 °C) 72 18 (!) 161/80 94 %   09/23/21 1927 97.7 °F (36.5 °C) 72 18 (!) 163/82 97 %     Oxygen Therapy  O2 Sat (%): 95 % (09/24/21 1100)    Estimated body mass index is 26.85 kg/m² as calculated from the following:    Height as of 8/12/21: 6' (1.829 m). Weight as of 8/12/21: 89.8 kg (198 lb). Intake/Output Summary (Last 24 hours) at 9/24/2021 1503  Last data filed at 9/24/2021 1121  Gross per 24 hour   Intake --   Output 200 ml   Net -200 ml         General:    Well nourished. No overt distress  Head:  Normocephalic, atraumatic  Eyes:  Sclerae appear normal.  Pupils equally round. HENT:  Nares appear normal, no drainage. Moist mucous membranes  Neck:  No restricted ROM. Trachea midline  CV:   RRR. No m/r/g. No JVD  Lungs:   CTAB. No wheezing, rhonchi, or rales. Appears even, unlabored  Abdomen: Bowel sounds present. Soft, nontender, nondistended. Extremities: Warm and dry. No cyanosis or clubbing. No edema. Skin:     No rashes. Normal turgor. Normal coloration  Neuro:  Cranial nerves II-XII grossly intact. Voice is noticeably hoarse/raspy. Spastic UEs and LEs. A/O x3. Psych:  Normal mood and affect.   Alert and oriented x3    Data Ordered and Personally Reviewed:    Last 24hr Labs:  Recent Results (from the past 24 hour(s))   PLEASE READ & DOCUMENT PPD TEST IN 24 HRS    Collection Time: 09/24/21 11:18 AM   Result Value Ref Range    PPD Negative Negative    mm Induration         All Micro Results     None          Current Meds:  Current Facility-Administered Medications   Medication Dose Route Frequency    acetaminophen (TYLENOL) tablet 650 mg  650 mg Oral Q6H PRN    Or    acetaminophen (TYLENOL) suppository 650 mg  650 mg Rectal Q6H PRN    ondansetron (ZOFRAN ODT) tablet 4 mg  4 mg Oral Q8H PRN    Or    ondansetron (ZOFRAN) injection 4 mg  4 mg IntraVENous Q6H PRN    polyethylene glycol (MIRALAX) packet 17 g  17 g Oral DAILY PRN    aspirin chewable tablet 81 mg  81 mg Oral DAILY       Other Studies:    No results found. Signed:  Raquel Srivastava MD    Part of this note may have been written by using a voice dictation software. The note has been proof read but may still contain some grammatical/other typographical errors.

## 2021-09-24 NOTE — PROGRESS NOTES
Problem: Mobility Impaired (Adult and Pediatric)  Goal: *Acute Goals and Plan of Care (Insert Text)  Outcome: Progressing Towards Goal  Note: STG:  (1.)Mr. Drew Carreon will move from supine to sit and sit to supine  with MODERATE ASSIST within 4-7 treatment day(s). (2.)Mr. Drew Carreon will transfer from bed to chair and chair to bed with CONTACT GUARD ASSIST using the least restrictive device within 4-7 treatment day(s). (3.)Mr. Jimenez will ambulate with CONTACT GUARD ASSIST for 100 feet with the least restrictive device within 4-7 treatment day(s). ________________________________________________________________________________________________    PHYSICAL THERAPY: Daily Note and PM 9/24/2021  INPATIENT: PT Visit Days : 1  Payor: Fatoumata Higginbotham / Plan: [address] [place] [address] HMO / Product Type: Managed Care Medicare /       NAME/AGE/GENDER: Trish Estevez is a 78 y.o. male   PRIMARY DIAGNOSIS: Inability to walk [R26.2]  Hoarseness [R49.0] Inability to walk Inability to walk       ICD-10: Treatment Diagnosis:    · Generalized Muscle Weakness (M62.81)  · Difficulty in walking, Not elsewhere classified (R26.2)   Precaution/Allergies:  Augmentin [amoxicillin-pot clavulanate]      ASSESSMENT:     Mr. Drew Carreon presents with generalized weakness and decreased independence with functional mobility. He will benefit from skilled PT interventions to maximize independence with functional mobility. He lives at home with his wife and was getting home health therapy at the house. Pt states he fell in august and has been receiving home health since then. Saw pt with OT today. Treatment today: Needing a lot of assist to move from supine to sit and has decreased sitting balance. Pt able to stand with min assist and walk in the room with RW. Pt stayed up in chair with needs in reach.  On his last hospital admission the family did not want SNF but therapy feels that this time he might be a little further below his baseline and recommend SNF, discussed this with SW. In pm pt up in chair on contact and asking to use the restroom. Worked on sit to stand with verbal cues for hand placement. Tendency to lean backwards in initial standing. Cues to stay close to the walker, walked in to the bathroom, cues to turn to the commode and then he sat and voided. Stood to walker with assist of 2 and walked back out into room. He did not want to lay down and wanted to keep sitting up in the chair. Set up in the chair with needs in reach, spouse at bedside. Will continue to follow for progressive mobility. This section established at most recent assessment   PROBLEM LIST (Impairments causing functional limitations):  1. Decreased Strength  2. Decreased ADL/Functional Activities  3. Decreased Transfer Abilities  4. Decreased Ambulation Ability/Technique  5. Decreased Balance  6. Decreased Activity Tolerance   INTERVENTIONS PLANNED: (Benefits and precautions of physical therapy have been discussed with the patient.)  1. Balance Exercise  2. Bed Mobility  3. Gait Training  4. Therapeutic Activites  5. Therapeutic Exercise/Strengthening  6. Transfer Training     TREATMENT PLAN: Frequency/Duration: daily for duration of hospital stay  Rehabilitation Potential For Stated Goals: Good     REHAB RECOMMENDATIONS (at time of discharge pending progress):    Placement: It is my opinion, based on this patient's performance to date, that Mr. Angelique Contreras may benefit from intensive therapy at a [address] [place] [address] after discharge due to the functional deficits listed above that are likely to improve with skilled rehabilitation and concerns that he/she may be unsafe to be unsupervised at home. Equipment:    To be determined              HISTORY:   History of Present Injury/Illness (Reason for Referral):  Copied from MD note: Patient is a 78 y.o. male who presented to the ED for cc persistent right arm weakness, inability to walk, and difficulty with speaking.  Neurology has seen and is concerned of motor neuron disease. Patient has medical history of BPH, CKD stage III. Most recent discharge from our facility on 8/14/21. ECHO was normal. Neurology was consulted. MRI Cervical showed mild central canal stenosis, but nothing serious. He was orthostatic positive. It was recommended to stop Flomax but the patient and wife wanted to talk to Urology first. EMG has been scheduled at [address] for this Monday at [address]. Transportation will need to be arranged so he arrives no later than 730 am.    Past Medical History/Comorbidities:   Mr. Gia Bledsoe  has a past medical history of Chronic kidney disease, Elevated PSA, Hearing difficulty, Hepatitis A (2020), Hypercholesterolemia, and Kidney stone. Mr. Gia Bledsoe  has a past surgical history that includes hx colonoscopy (05/18/2016). Social History/Living Environment:   Home Environment: Private residence  Living Alone: No  Support Systems: Spouse/Significant Other  Current DME Used/Available at Home: Walker, rolling  Prior Level of Function/Work/Activity:  Pt living at home with spouse, receiving home health therapy     Number of Personal Factors/Comorbidities that affect the Plan of Care: 1-2: MODERATE COMPLEXITY   EXAMINATION:   Most Recent Physical Functioning:   Gross Assessment:  AROM: Generally decreased, functional  Strength: Generally decreased, functional               Posture:  Posture (WDL): Exceptions to WDL  Posture Assessment: Forward head, Kyphosis, Rounded shoulders  Balance:  Sitting: Impaired;High guard; With support  Sitting - Static: Fair (occasional); Good (unsupported)  Sitting - Dynamic: Fair (occasional); Poor (constant support)  Standing: Impaired;Pull to stand; With support  Standing - Static: Fair  Standing - Dynamic : Fair Bed Mobility:  Supine to Sit: Maximum assistance  Scooting: Maximum assistance  Wheelchair Mobility:     Transfers:  Sit to Stand: Minimum assistance;Assist x2  Stand to Sit: Minimum assistance;Assist x2  Gait: Speed/Amanda: Pace decreased (<100 feet/min); Shuffled  Step Length: Left shortened;Right shortened  Gait Abnormalities: Decreased step clearance;Shuffling gait  Distance (ft): 15 Feet (ft) (and another 15 feet)  Assistive Device: Walker, rolling  Ambulation - Level of Assistance: Minimal assistance (of 1 to 2)  Interventions: Safety awareness training;Verbal cues  Duration: 10 Minutes      Body Structures Involved:  1. Muscles Body Functions Affected:  1. Movement Related Activities and Participation Affected:  1. Mobility  2. Self Care   Number of elements that affect the Plan of Care: 4+: HIGH COMPLEXITY   CLINICAL PRESENTATION:   Presentation: Evolving clinical presentation with changing clinical characteristics: MODERATE COMPLEXITY   CLINICAL DECISION MAKIN [facility] Inpatient Short Form  How much difficulty does the patient currently have. .. Unable A Lot A Little None   1. Turning over in bed (including adjusting bedclothes, sheets and blankets)? [] 1   [x] 2   [] 3   [] 4   2. Sitting down on and standing up from a chair with arms ( e.g., wheelchair, bedside commode, etc.)   [] 1   [] 2   [x] 3   [] 4   3. Moving from lying on back to sitting on the side of the bed? [] 1   [x] 2   [] 3   [] 4   How much help from another person does the patient currently need. .. Total A Lot A Little None   4. Moving to and from a bed to a chair (including a wheelchair)? [] 1   [] 2   [x] 3   [] 4   5. Need to walk in hospital room? [] 1   [] 2   [x] 3   [] 4   6. Climbing 3-5 steps with a railing? [] 1   [x] 2   [] 3   [] 4   © , Trustees of [facility] [employer], under license to [employer]. All rights reserved      Score:  Initial: 15 Most Recent: X (Date: -- )    Interpretation of Tool:  Represents activities that are increasingly more difficult (i.e. Bed mobility, Transfers, Gait).     Medical Necessity:     · Patient is expected to demonstrate progress in · strength and functional technique  ·  to   · decrease assistance required with functional mobility  · . Reason for Services/Other Comments:  · Patient continues to require skilled intervention due to   · Inability to complete functional mobility independently  · . Use of outcome tool(s) and clinical judgement create a POC that gives a: Clear prediction of patient's progress: LOW COMPLEXITY            TREATMENT:   (In addition to Assessment/Re-Assessment sessions the following treatments were rendered)   Pre-treatment Symptoms/Complaints:  none  Pain: Initial: no reports of pain     Post Session:  no reports of pain     Therapeutic Activity: (    15): Therapeutic activities including Chair transfers, Toilet transfers and Ambulation on level ground to improve mobility, strength, balance and coordination. Required minimal assist of 2 for Safety awareness training;Verbal cues to promote static and dynamic balance in standing. Braces/Orthotics/Lines/Etc:   · IV  Treatment/Session Assessment:    · Response to Treatment: weak and unsteady  · Interdisciplinary Collaboration:   o Certified Nursing Assistant/Patient Care Technician  · After treatment position/precautions:   o Up in chair  o Bed/Chair-wheels locked  o Call light within reach  o Family at bedside   · Compliance with Program/Exercises: Will assess as treatment progresses  · Recommendations/Intent for next treatment session: \"Next visit will focus on advancements to more challenging activities and reduction in assistance provided\".   Total Treatment Duration:  PT Patient Time In/Time Out  Time In: 1515  Time Out: 6373 Amador Kowalski PT

## 2021-09-24 NOTE — PROGRESS NOTES
09/23/21 2000   Dual Skin Pressure Injury Assessment   Dual Skin Pressure Injury Assessment WDL   Second Care Provider (Based on [address] [place] [address]) Meggan LOPEZ   Skin Integumentary   Skin Integumentary (WDL) X   Skin Color Appropriate for ethnicity   Skin Condition/Temp Dry; Warm   Skin Integrity Abrasion  (bilat upper/lower exts.)

## 2021-09-24 NOTE — PROGRESS NOTES
Problem: Self Care Deficits Care Plan (Adult)  Goal: *Acute Goals and Plan of Care (Insert Text)  Outcome: Progressing Towards Goal  Note:   1. Patient will complete feeding with stand by assist to increase self care independence. 2. Patient will complete bathing with contact guard assist to increase self care independence. 3. Patient will improve static standing at edge of bed for 5 minutes to improve independence with transfers and self cares. 4. Patient will tolerate 40 minutes of OT treatment with self incorporated rest breaks to increase activity tolerance to enhance participation in hobbies. 5. Patient will complete all functional transfers with contact guard assist using adaptive equipment as needed. 6. Patient will complete UE exercises with stand by assist to increase overall activity tolerance and strength. Timeframe: 7 visits       OCCUPATIONAL THERAPY: Initial Assessment and Daily Note 9/24/2021  INPATIENT: OT Visit Days: 1  Payor: [employer] / Plan: [address] [place] [address] HMO / Product Type: [employer] Medicare /      NAME/AGE/GENDER: Lisa Montalvo is a 78 y.o. male   PRIMARY DIAGNOSIS:  Inability to walk [R26.2]  Hoarseness [R49.0] Inability to walk Inability to walk        ICD-10: Treatment Diagnosis:    · Pain in Right Shoulder (M25.511)  · Generalized Muscle Weakness (M62.81)  · Other lack of cordination (R27.8)  · Difficulty in walking, Not elsewhere classified (R26.2)   Precautions/Allergies:     Augmentin [amoxicillin-pot clavulanate]      ASSESSMENT:     Mr. Rowdy Fernandez presents with progressive neurological weakness and rigidity for months with fall 6 weeks ago causing increased motor loss in RUE, including digits 2 through 5. Patient reports spouse has been providing significant assist at home as well as receiving HHPT. Difficult to understand patient due to dysarthria but can make out certain words at times.  Patient needs significant assist with bed mobility due to severe rigidity. This same rigidity helps support him in stance. Ambulation requiring significantly less assist due to rigid muscles. Patient is Right handed. RUE is painful, stiff, with loss of function and strength. Patient will require significant 24/7 assist at d/c. Initiate OT. This section established at most recent assessment   PROBLEM LIST (Impairments causing functional limitations):  1. Decreased Strength  2. Decreased ADL/Functional Activities  3. Decreased Transfer Abilities  4. Decreased Balance  5. Increased Shortness of Breath   INTERVENTIONS PLANNED: (Benefits and precautions of occupational therapy have been discussed with the patient.)  1. Activities of daily living training  2. Adaptive equipment training  3. Neuromuscular re-eduation  4. Therapeutic activity  5. Therapeutic exercise     TREATMENT PLAN: Frequency/Duration: Follow patient 3x a week to address above goals. Rehabilitation Potential For Stated Goals: [facility] (at time of discharge pending progress):    Placement: It is my opinion, based on this patient's performance to date, that Mr. Aminah Davis may benefit from participating in 1-2 additional therapy sessions in order to continue to assess for rehab potential and then make recommendation for disposition at discharge. Equipment:    None at this time              OCCUPATIONAL PROFILE AND HISTORY:   History of Present Injury/Illness (Reason for Referral):  See H&P  Past Medical History/Comorbidities:   Mr. Aminah Davis  has a past medical history of Chronic kidney disease, Elevated PSA, Hearing difficulty, Hepatitis A (2020), Hypercholesterolemia, and Kidney stone. Mr. Aminah Davis  has a past surgical history that includes hx colonoscopy (05/18/2016).   Social History/Living Environment:   Home Environment: Private residence  Living Alone: No  Support Systems: Spouse/Significant Other  Current DME Used/Available at Home: Walker, rolling  Prior Level of Function/Work/Activity:  Appears needed assist with ADLs and mobility for a few months. Number of Personal Factors/Comorbidities that affect the Plan of Care: Expanded review of therapy/medical records (1-2):  MODERATE COMPLEXITY   ASSESSMENT OF OCCUPATIONAL PERFORMANCE[de-identified]   Activities of Daily Living:   Basic ADLs (From Assessment) Complex ADLs (From Assessment)   Feeding: Minimum assistance  Oral Facial Hygiene/Grooming: Moderate assistance  Bathing: Maximum assistance  Upper Body Dressing: Moderate assistance  Lower Body Dressing: Total assistance     Grooming/Bathing/Dressing Activities of Daily Living                       Functional Transfers  Bathroom Mobility: Minimum assistance     Bed/Mat Mobility  Supine to Sit: Maximum assistance  Sit to Stand: Moderate assistance  Stand to Sit: Moderate assistance  Bed to Chair: Moderate assistance  Scooting: Maximum assistance     Most Recent Physical Functioning:   Gross Assessment:                  Posture:     Balance:  Sitting: With support;High guard  Standing: With support;Pull to stand Bed Mobility:  Supine to Sit: Maximum assistance  Scooting: Maximum assistance  Wheelchair Mobility:     Transfers:  Sit to Stand: Moderate assistance  Stand to Sit: Moderate assistance  Bed to Chair: Moderate assistance            Patient Vitals for the past 6 hrs:   BP SpO2 Pulse   09/24/21 0700 (!) 159/78 95 % 76       Mental Status  Neurologic State: Alert  Orientation Level: Oriented X4  Cognition: Follows commands                          Physical Skills Involved:  1. Range of Motion  2. Balance  3. Strength  4. Activity Tolerance Cognitive Skills Affected (resulting in the inability to perform in a timely and safe manner):  1. Executive Function  2. Expression Psychosocial Skills Affected:  1. Habits/Routines  2. Environmental Adaptation  3.  Social Interaction   Number of elements that affect the Plan of Care: 3-5:  MODERATE COMPLEXITY   CLINICAL DECISION MAKING: [facility] [employer] AM-PAC 6 Clicks   Daily Activity Inpatient Short Form  How much help from another person does the patient currently need. .. Total A Lot A Little None   1. Putting on and taking off regular lower body clothing? [x] 1   [] 2   [] 3   [] 4   2. Bathing (including washing, rinsing, drying)? [x] 1   [] 2   [] 3   [] 4   3. Toileting, which includes using toilet, bedpan or urinal?   [x] 1   [] 2   [] 3   [] 4   4. Putting on and taking off regular upper body clothing? [] 1   [x] 2   [] 3   [] 4   5. Taking care of personal grooming such as brushing teeth? [] 1   [x] 2   [] 3   [] 4   6. Eating meals? [] 1   [x] 2   [] 3   [] 4   © 2007, Trustees of [facility] [employer], under license to [employer]. All rights reserved      Score:  Initial: 9 Most Recent: X (Date: -- )    Interpretation of Tool:  Represents activities that are increasingly more difficult (i.e. Bed mobility, Transfers, Gait). Medical Necessity:     · Skilled intervention continues to be required due to Deficits noted above. Reason for Services/Other Comments:  · Patient continues to require skilled intervention due to   · Dx above  · . Use of outcome tool(s) and clinical judgement create a POC that gives a: MODERATE COMPLEXITY         TREATMENT:   (In addition to Assessment/Re-Assessment sessions the following treatments were rendered)     Pre-treatment Symptoms/Complaints:    Pain: Initial:   Pain Intensity 1: 1  Pain Location 1: Arm  Pain Orientation 1: Right  Post Session:  1     Self Care: (15): Procedure(s) (per grid) utilized to improve and/or restore self-care/home management as related to dressing, grooming, and transfers . Required maximal verbal and tactile cueing to facilitate activities of daily living skills. Initial evaluation 10 minutes.      Braces/Orthotics/Lines/Etc:   ·    Treatment/Session Assessment:    · Response to Treatment:  Good, sitting up in recliner  · Interdisciplinary Collaboration:   o Physical Therapist  o Occupational Therapist  o Registered Nurse  · After treatment position/precautions:   o Up in chair  o Call light within reach  o RN notified   · Compliance with Program/Exercises: Compliant all of the time, Will assess as treatment progresses. · Recommendations/Intent for next treatment session: \"Next visit will focus on advancements to more challenging activities and reduction in assistance provided\".   Total Treatment Duration:  OT Patient Time In/Time Out  Time In: 0820  Time Out: R [address] 53, OT

## 2021-09-24 NOTE — PROGRESS NOTES
Patient seen remotely by teledoc video. Assistance and examination by Carlos Cardenas RN. About the same overnight. History confirmed with wife about 2 to 3-month history of progressive motor difficulty and change in voice. On examination careful inspection of the large muscles of the back and torso reveals no evidence of atrophy or fasciculation. Voice is intermittently very hoarse. Diffuse spasticity present in upper and lower extremities. MRI of cervical spine reviewed with Dr. Miles Carter neuroradiology. Very mild mid cervical central canal stenosis is present without signal change in cord. This is unlikely to account for the patient's presentation. Speech therapy evaluation is pending as is MRI of thoracic and lumbar spine. The patient is currently scheduled for EMG nerve conduction study at 2 PM Monday, September 27 at [address] [place] [address] Dr. Devang Luna [address] [place] [address] outpatient neurology). 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 Agree with PT and OT evaluation.     Signed By: Raegan Pimentel MD     September 24, 2021

## 2021-09-24 NOTE — PROGRESS NOTES
Patient received as an admission from  ER via EMS stretcher to room 311. Patient transferred with assist x3 to bed. Patient  A&Ox4,orientation to room/unit given. Admission assessment completed. Bed in low position, call bell in place. Will monitor.

## 2021-09-24 NOTE — PROGRESS NOTES
LTG: Patient will tolerate least restrictive diet without overt signs or symptoms of airway compromise. STG: Patient will tolerate minced/moist diet and mildly thick liquids by teaspoon without overt signs or symptoms of airway compromise. STG: Patient will participate in modified barium swallow study as clinically indicated. SPEECH LANGUAGE PATHOLOGY: DYSPHAGIA- Initial Assessment    NAME/AGE/GENDER: Trish Estevez is a 78 y.o. male  DATE: 9/24/2021  PRIMARY DIAGNOSIS: Inability to walk [R26.2]  Hoarseness [R49.0]      ICD-10: Treatment Diagnosis: R13.12 Dysphagia, Oropharyngeal Phase    RECOMMENDATIONS   DIET: With known aspiration risk:   Minced and Moist   mildly thick liquids (NECTAR) by teaspoon only    MEDICATIONS: Crushed in applesauce if appropriate      PRECAUTIONS, MODIFICATIONS, AND STRATEGIES  · Slow rate of intake  · Small bites/sips  · Upright at 90 degrees during meal  · MILDLY THICK LIQUIDS/NECTAR BY TEASPOON ONLY     RECOMMENDATIONS FOR CONTINUED SPEECH THERAPY:   YES: Anticipate need for ongoing speech therapy during this hospitalization and at next level of care. ASSESSMENT   Patient known to speech therapy due to recent history of dysphagia. Modified barium swallow study completed 8/3/21 revealed mild-moderate oropharyngeal dysphagia with SILENT aspiration of thin liquids by cup. Recommendations following study for an easy to chew diet and mildly thick liquids (nectar) by cup only. Per wife at bedside, patient has been consuming both thin and mildly thick liquids at home. Patient presents with ongoing oropharyngeal dysphagia this date. Hoarse/harsh vocal quality noted. Mild oral holding and tongue pump with mildly thick liquids by cup. Later in session, patient began exhibiting immediate cough post swallow with single sips of mildly thick liquids by cup. No overt s/sx airway compromise with mildly thick liquids by teaspoon, puree, or minced/moist trials.  Delayed coughing with cracker x1. Though consuming minced/moist diet and mildly thick liquids by teaspoon without overt s/sx airway comproimse, patient remains at risk for aspiraiton due to history of dysphagia and concerns for possible motor neuron disease/potentially primary lateral sclerosis per neurology. Educated patient and patient's wife on aspiration risk. Patient and patient's wife expressed they want patient to continue oral diet. Recommended diet with known risk for aspiration: minced/moist diet and mildly thick liquids (NECTAR) by teaspoon only. Will continue to follow for diet tolerance. Patient scheduled for EMG nerve conduction study on Monday, 9/27. Will tentitively plan for modified barium swallow study Tuesday 9/28 if clinically indicated and in line with goals of care. EDUCATION:  · Recommendations discussed with Patient, Family, MD and RN    REHABILITATION POTENTIAL FOR STATED GOALS: Fair    PLAN    FREQUENCY/DURATION:   Continue to follow patient 3 times a week for duration of hospital stay to address above goals. Recommendations for next treatment session: Next treatment session will address diet tolerance    CONTINUATION OF SKILLED SERVICES/MEDICAL NECESSITY:   Patient is expected to demonstrate progress in  swallow strength, swallow timeliness, swallow function, diet tolerance and swallow safety in order to  improve swallow safety, work toward diet advancement and decrease aspiration risk.  Patient continues to require skilled intervention due to dysphagia. SUBJECTIVE   Patient alert upright in bedside chair for assessment. Flat affect. Wife at bedside. Oxygen Device: room air  Pain: Pain Scale 1: Adult Nonverbal Pain Scale  Pain Intensity 1: 0  Pain Location 1: Arm    History of Present Injury/Illness: Mr. China Whitlock  has a past medical history of Chronic kidney disease, Elevated PSA, Hearing difficulty, Hepatitis A (2020), Hypercholesterolemia, and Kidney stone. Fredo Jett  He also  has a past surgical history that includes hx colonoscopy (05/18/2016). PRECAUTIONS/ALLERGIES: Augmentin [amoxicillin-pot clavulanate]     Problem List:  (Impairments causing functional limitations):  1. Oropharyngeal dysphagia    Previous Dysphagia: YES Modified barium swallow study completed 8/3/21 with results as follows:     \"Mr. Anne Marie Churchill presents with mild-moderate oropharyngeal dysphagia. Oral dysphagia with reduced tongue control impacting oral containment and lingual motion/transport slowed and repetitive. Premature spillage into pharynx to laryngeal vestibule and pyriforms with non clearing penetration and SILENT aspiration of thin liquids during the swallow with thin liquid via cup and delayed swallow initiation at pyriforms resulting in non clearing shallow penetration with mildly thick liquids via straw. There was no new penetration or aspiration with mildly thick liquids via cup, moderately thick liquids via cup and straw, pudding, and chewables.      Recommend easy to chew diet and MILDLY THICK LIQUIDS via cup only. No straws. \"        Diet Prior to Evaluation: minced/moist diet and thin liquids    Orientation:  Person  Place    Cognitive-Linguistic Screening:   Alertness  o Alert   Speech Production:   o Some dysarthria, but able to understand. Hoarse vocal quality   Expressive Language:  o Answering some basic questions without anomia. Needs further assessment   Receptive Language:  o Answers yes/no questions appropriately and Follows commands with visual model needed at times- hard of hearing suspect impacting performance   Cognition:   o Wife answering most questions for patient. Patient with delayed response time. Prior Level of Function: Lives at home with wife. Recommendations: Given results of screening, further evaluation is indicated to assess speech-language and cognitive ablities.     OBJECTIVE   Oral Motor:   · Labial: No impairment  · Dentition: Upper Dentures and Lower Dentures  · Oral Hygiene: Dry  · Lingual: No impairment    Dysphagia evaluation:   Patient consumed trials of mildly thick by teaspoon/cup, puree, minced/moist, and solid consistency. Thin liquid trials deferred due to history of silent aspiration of thin liquids on modified barium swallow study 8/3/21. Patient initially consuming ~ 2 oz mildly thick by cup without overt s/sx airway compromise; however, later in session immediate cough post swallow across 3 consecutive trials. No overt s/sx airway compromise with ~ 4 oz mildly thick by teaspoon, puree across 4 trials, or minced and moist across 2 trials. Mild oral holding and tongue pumping with mildly thick liquids despite presentation. Functional oral prep and clearance with puree and minced/moist. Mildly delayed mastication of solid trial and delayed cough on 1 out of 4 trials. Tool Used: Dysphagia Outcome and Severity Scale (ANITA)    Score Comments   Normal Diet  [] 7 With no strategies or extra time needed   Functional Swallow  [] 6 May have mild oral or pharyngeal delay   Mild Dysphagia  [] 5 Which may require one diet consistency restricted    Mild-Moderate Dysphagia  [] 4 With 1-2 diet consistencies restricted   Moderate Dysphagia  [x] 3 With 2 or more diet consistencies restricted   Moderate-Severe Dysphagia  [] 2 With partial PO strategies (trials with ST only)   Severe Dysphagia  [] 1 With inability to tolerate any PO safely      Score:  Initial: 3 Most Recent: x (Date 09/24/21 )   Interpretation of Tool: The Dysphagia Outcome and Severity Scale (ANITA) is a simple, easy-to-use, 7-point scale developed to systematically rate the functional severity of dysphagia based on objective assessment and make recommendations for diet level, independence level, and type of nutrition.        Current Medications:   Current Facility-Administered Medications on File Prior to Encounter   Medication Dose Route Frequency Provider Last Rate Last Admin    [COMPLETED] sodium chloride 0.9 % bolus infusion 1,000 mL  1,000 mL IntraVENous Marissa Koko Patel MD   IV Completed at 09/23/21 1856    [DISCONTINUED] aspirin chewable tablet 81 mg  81 mg Oral DAILY Camron Penta, DO        [DISCONTINUED] sodium chloride (NS) flush 5-40 mL  5-40 mL IntraVENous Q8H Camron Penta, DO        [DISCONTINUED] sodium chloride (NS) flush 5-40 mL  5-40 mL IntraVENous PRN Camron Penta, DO        [DISCONTINUED] acetaminophen (TYLENOL) tablet 650 mg  650 mg Oral Q6H PRN Camron Penta, DO        [DISCONTINUED] acetaminophen (TYLENOL) suppository 650 mg  650 mg Rectal Q6H PRN Camron Penta, DO        [DISCONTINUED] polyethylene glycol (MIRALAX) packet 17 g  17 g Oral DAILY PRN Camron Penta, DO        [DISCONTINUED] ondansetron (ZOFRAN ODT) tablet 4 mg  4 mg Oral Q8H PRN Camron Penta, DO        [DISCONTINUED] ondansetron [facility]) injection 4 mg  4 mg IntraVENous Q6H PRN Camron Penta, DO        [DISCONTINUED] tuberculin injection 5 Units  5 Units IntraDERMal Cathlean Sox, DO         Current Outpatient Medications on File Prior to Encounter   Medication Sig Dispense Refill    finasteride (PROSCAR) 5 mg tablet Take 1 Tablet by mouth daily. TAKE ONE TABLET BY MOUTH EVERY DAY 90 Tablet 1    tamsulosin (FLOMAX) 0.4 mg capsule Take 1 Capsule by mouth daily. 90 Capsule 1    predniSONE (DELTASONE) 20 mg tablet Take 20 mg by mouth two (2) times a day for 5 days. 10 Tablet 0    aspirin 81 mg chewable tablet Take 1 Tablet by mouth daily.  30 Tablet 2        INTERDISCIPLINARY COLLABORATION: RN and MD    After treatment position/precautions:  · Upright in chair  · Call light within reach  · Wife at bedside  · RN notified    Total Treatment Duration:   Time In: 1913  Time Out: 24[address] [facility] [place] [address], [address] [facility]

## 2021-09-24 NOTE — PROGRESS NOTES
Problem: Mobility Impaired (Adult and Pediatric)  Goal: *Acute Goals and Plan of Care (Insert Text)  Outcome: Progressing Towards Goal  Note: STG:  (1.)Mr. Jaun Vega will move from supine to sit and sit to supine  with MODERATE ASSIST within 4-7 treatment day(s). (2.)Mr. Jaun Vega will transfer from bed to chair and chair to bed with CONTACT GUARD ASSIST using the least restrictive device within 4-7 treatment day(s). (3.)Mr. Jimenez will ambulate with CONTACT GUARD ASSIST for 100 feet with the least restrictive device within 4-7 treatment day(s). ________________________________________________________________________________________________    PHYSICAL THERAPY: Initial Assessment and AM 9/24/2021  INPATIENT: PT Visit Days : 1  Payor: Peewee Howard / Plan: [address] [place] [address] HMO / Product Type: Managed Care Medicare /       NAME/AGE/GENDER: Dona Cruz is a 78 y.o. male   PRIMARY DIAGNOSIS: Inability to walk [R26.2]  Hoarseness [R49.0] Inability to walk Inability to walk        ICD-10: Treatment Diagnosis:    Generalized Muscle Weakness (M62.81)  Difficulty in walking, Not elsewhere classified (R26.2)   Precaution/Allergies:  Augmentin [amoxicillin-pot clavulanate]      ASSESSMENT:     Mr. Jaun Vega presents with generalized weakness and decreased independence with functional mobility. He will benefit from skilled PT interventions to maximize independence with functional mobility. He lives at home with his wife and was getting home health therapy at the house. Pt states he fell in august and has been receiving home health since then. Saw pt with OT today. Treatment today: Needing a lot of assist to move from supine to sit and has decreased sitting balance. Pt able to stand with min assist and walk in the room with RW. Pt stayed up in chair with needs in reach.  On his last hospital admission the family did not want SNF but therapy feels that this time he might be a little further below his baseline and recommend SNF, discussed this with SW. This section established at most recent assessment   PROBLEM LIST (Impairments causing functional limitations):  Decreased Strength  Decreased ADL/Functional Activities  Decreased Transfer Abilities  Decreased Ambulation Ability/Technique  Decreased Balance  Decreased Activity Tolerance   INTERVENTIONS PLANNED: (Benefits and precautions of physical therapy have been discussed with the patient.)  Balance Exercise  Bed Mobility  Gait Training  Therapeutic Activites  Therapeutic Exercise/Strengthening  Transfer Training     TREATMENT PLAN: Frequency/Duration: daily for duration of hospital stay  Rehabilitation Potential For Stated Goals: Good     REHAB RECOMMENDATIONS (at time of discharge pending progress):    Placement: It is my opinion, based on this patient's performance to date, that Mr. Sandy Tejeda may benefit from intensive therapy at a [address] after discharge due to the functional deficits listed above that are likely to improve with skilled rehabilitation and concerns that he/she may be unsafe to be unsupervised at home. Equipment: To be determined              HISTORY:   History of Present Injury/Illness (Reason for Referral):  Copied from MD note: Patient is a 78 y.o. male who presented to the ED for cc persistent right arm weakness, inability to walk, and difficulty with speaking. Neurology has seen and is concerned of motor neuron disease. Patient has medical history of BPH, CKD stage III. Most recent discharge from our facility on 8/14/21. ECHO was normal. Neurology was consulted. MRI Cervical showed mild central canal stenosis, but nothing serious. He was orthostatic positive.  It was recommended to stop Flomax but the patient and wife wanted to talk to Urology first. EMG has been scheduled at [place] for this Monday at [address] will need to be arranged so he arrives no later than 730 am.    Past Medical History/Comorbidities:    Marlen Henning  has a past medical history of Chronic kidney disease, Elevated PSA, Hearing difficulty, Hepatitis A (2020), Hypercholesterolemia, and Kidney stone. Mr. Marlen Henning  has a past surgical history that includes hx colonoscopy (05/18/2016). Social History/Living Environment:   Home Environment: Private residence  Living Alone: No  Support Systems: Spouse/Significant Other  Current DME Used/Available at Home: Walker, rolling  Prior Level of Function/Work/Activity:  Pt living at home with spouse, receiving home health therapy     Number of Personal Factors/Comorbidities that affect the Plan of Care: 1-2: MODERATE COMPLEXITY   EXAMINATION:   Most Recent Physical Functioning:   Gross Assessment:  AROM: Generally decreased, functional  Strength: Generally decreased, functional               Posture:  Posture (WDL): Exceptions to WDL  Posture Assessment: Forward head, Kyphosis, Rounded shoulders  Balance:  Sitting: Impaired;High guard; With support  Sitting - Static: Fair (occasional); Good (unsupported)  Sitting - Dynamic: Fair (occasional); Poor (constant support)  Standing: Impaired;Pull to stand; With support  Standing - Static: Fair  Standing - Dynamic : Fair Bed Mobility:  Supine to Sit: Maximum assistance  Scooting: Maximum assistance  Wheelchair Mobility:     Transfers:  Sit to Stand: Moderate assistance  Stand to Sit: Moderate assistance  Gait:     Speed/Amanda: Pace decreased (<100 feet/min); Shuffled  Step Length: Left shortened;Right shortened  Gait Abnormalities: Decreased step clearance;Shuffling gait  Distance (ft): 25 Feet (ft)  Assistive Device: Walker, rolling  Ambulation - Level of Assistance: Minimal assistance  Interventions: Safety awareness training;Verbal cues  Duration: 10 Minutes      Body Structures Involved:  Muscles Body Functions Affected: Movement Related Activities and Participation Affected:   Mobility  Self Care   Number of elements that affect the Plan of Care: 4+: HIGH COMPLEXITY   CLINICAL PRESENTATION:   Presentation: Evolving clinical presentation with changing clinical characteristics: MODERATE COMPLEXITY   CLINICAL DECISION MAKIN [facility] Mobility Inpatient Short Form  How much difficulty does the patient currently have. .. Unable A Lot A Little None   1. Turning over in bed (including adjusting bedclothes, sheets and blankets)? [] 1   [x] 2   [] 3   [] 4   2. Sitting down on and standing up from a chair with arms ( e.g., wheelchair, bedside commode, etc.)   [] 1   [] 2   [x] 3   [] 4   3. Moving from lying on back to sitting on the side of the bed? [] 1   [x] 2   [] 3   [] 4   How much help from another person does the patient currently need. .. Total A Lot A Little None   4. Moving to and from a bed to a chair (including a wheelchair)? [] 1   [] 2   [x] 3   [] 4   5. Need to walk in hospital room? [] 1   [] 2   [x] 3   [] 4   6. Climbing 3-5 steps with a railing? [] 1   [x] 2   [] 3   [] 4   © , Trustees of [facility] [employer], under license to [employer]. All rights reserved      Score:  Initial: 15 Most Recent: X (Date: -- )    Interpretation of Tool:  Represents activities that are increasingly more difficult (i.e. Bed mobility, Transfers, Gait). Medical Necessity:     Patient is expected to demonstrate progress in   strength and functional technique   to   decrease assistance required with functional mobility  . Reason for Services/Other Comments:  Patient continues to require skilled intervention due to   Inability to complete functional mobility independently  .    Use of outcome tool(s) and clinical judgement create a POC that gives a: Clear prediction of patient's progress: LOW COMPLEXITY            TREATMENT:   (In addition to Assessment/Re-Assessment sessions the following treatments were rendered)   Pre-treatment Symptoms/Complaints:  none  Pain: Initial: no reports of pain     Post Session:  no reports of pain     Gait Training (10 Minutes):  Gait training to improve and/or restore physical functioning as related to mobility and strength. Ambulated 25 Feet (ft) with Minimal assistance using a Walker, rolling and minimal Safety awareness training;Verbal cues related to their stance phase and stride length to promote proper body alignment and promote proper body posture. Assessment    Braces/Orthotics/Lines/Etc:   IV  Treatment/Session Assessment:    Response to Treatment:  pt did fairly well walking in room, weak and unsteady  Interdisciplinary Collaboration:   Occupational Therapist  Registered Nurse  After treatment position/precautions:   Up in chair  Bed/Chair-wheels locked  Call light within reach   Compliance with Program/Exercises: Will assess as treatment progresses  Recommendations/Intent for next treatment session: \"Next visit will focus on advancements to more challenging activities and reduction in assistance provided\".   Total Treatment Duration:  PT Patient Time In/Time Out  Time In: 0830  [facility]

## 2021-09-24 NOTE — PROGRESS NOTES
MRI thoracic and lumbar spine done without contrast. Patient unable to tolerate length of contrasted double study MRI (over an hour).

## 2021-09-25 NOTE — PROGRESS NOTES
Pt in bed resting, bed low and locked, call light within reach, gripper socks on, side rails up x3. Shift assessment complete, bed alarm  no needs at this time, will continue to monitor.

## 2021-09-25 NOTE — PROGRESS NOTES
Hospitalist Progress Note   Admit Date:  2021  7:29 PM   Name:  Hugo Vásquez   Age:  78 y.o. Sex:  male  :  1940   MRN:  [id number]   Room:  [facility]/    Presenting Complaint: No chief complaint on file. Initial Admission Diagnosis: Inability to walk [R26.2]  Hoarseness [R49.0]     Assessment and Plan:   # Progressive weakness              - Seen by Neurology at [facility] ER on , suspicious for motor neuron disease. MRI T/L spine largely unremarkable; C-spine with some very mild cord narrowing. Con't PT/OT/ST. Has EMG scheduled for  at [address] Neurology office. Appreciate Neurology help. Diet per speech, may need repeat MBS. Discharge Planning: Placement. Diet:  ADULT DIET Dysphagia - Minced & Moist; Mildly Thick (Nectar); Liquids by Spoon only  DVT PPx: SCDs  Code status: DNR    Hospital Problems as of 2021 Date Reviewed: 2021        Codes Class Noted - Resolved POA    Hoarseness ICD-10-CM: R49.0  ICD-9-CM: 784.42  2021 - Present Yes        * (Principal) Inability to walk ICD-10-CM: R26.2  ICD-9-CM: 719.7  2021 - Present Yes              Hospital Course:   Mr. Bright Alvarenga is a nice 77 y/o WM who presented to the ER at [facility] on  with ongoing R sided weakness, hoarse voice and trouble ambulating. He was hospitalized last month and had an unremarkable CVA work up. He was discharged home. Went to a [facility] ER recently and ultimately discharged from there. He saw his PCP on  due to ongoing weakness, drooling and difficulty with speech. His PCP called and spoke to Dr. Yulissa Salvador who advised that he go to the ER for further evaluation. He was seen by Neurology with concerns for motor neuron disease. C-spine MRI showed mild central canal narrowing w/o cord signal changes. Since no beds were available at [facility] he was transferred to [facility] for further management. Thoracolumbar MRIs are pending.  PT/OT have been consulted and per Neurology he has an EMG scheduled at 2pm on Monday 9/27 at the [address] Neuro office. 24hr Events/Subjective (09/25/21):   9/25: Remains weak, hoarse voice, wife at bedside says patient has been in chair majority of day. She has not seen him ambulate. MRIs reviewed w patient and wife. No chest pain or SOB. Objective:     Patient Vitals for the past 24 hrs:   Temp Pulse Resp BP SpO2   09/25/21 1236 97.4 °F (36.3 °C) 71 18 128/63 95 %   09/25/21 0628 97.9 °F (36.6 °C) 91 16 (!) 180/93 96 %   09/25/21 0350 98.2 °F (36.8 °C) 71 18 (!) 158/83 94 %   09/24/21 2330 98.1 °F (36.7 °C) 90 18 (!) 149/88 90 %   09/24/21 1901 97.6 °F (36.4 °C) 62 18 (!) 151/91 94 %     Oxygen Therapy  O2 Sat (%): 95 % (09/25/21 1236)  O2 Device: None (Room air) (09/24/21 1901)    Estimated body mass index is 26.85 kg/m² as calculated from the following:    Height as of 8/12/21: 6' (1.829 m). Weight as of 8/12/21: 89.8 kg (198 lb). No intake or output data in the 24 hours ending 09/25/21 1551      General:    Well nourished. No overt distress  Head:  Normocephalic, atraumatic  Eyes:  Sclerae appear normal.  Pupils equally round. HENT:  Nares appear normal.  Moist mucous membranes. Audible secretions/hoarse voice. Neck:  No restricted ROM. Trachea midline  CV:   RRR. No m/r/g. No JVD  Lungs:   CTAB. No wheezing, rhonchi, or rales. Appears even, unlabored  Abdomen: Bowel sounds present. Soft, nontender, nondistended. Extremities: Warm and dry. No cyanosis or clubbing. No edema. Skin:     No rashes. Normal turgor. Normal coloration  Neuro:  Cranial nerves II-XII grossly intact. Speech is slurred, R sided weakness. Psych:  Normal mood and affect. Alert and oriented x3. Data Ordered and Personally Reviewed:    Last 24hr Labs:  No results found for this or any previous visit (from the past 24 hour(s)).     All Micro Results     None          Current Meds:  Current Facility-Administered Medications   Medication Dose Route Frequency    acetaminophen (TYLENOL) tablet 650 mg  650 mg Oral Q6H PRN    Or    acetaminophen (TYLENOL) suppository 650 mg  650 mg Rectal Q6H PRN    ondansetron (ZOFRAN ODT) tablet 4 mg  4 mg Oral Q8H PRN    Or    ondansetron (ZOFRAN) injection 4 mg  4 mg IntraVENous Q6H PRN    polyethylene glycol (MIRALAX) packet 17 g  17 g Oral DAILY PRN    aspirin chewable tablet 81 mg  81 mg Oral DAILY       Other Studies:    MRI [facility] SPINE WO CONT    Result Date: 9/24/2021  Exam: MRI thoracic and lumbar spine without contrast. Indication: Difficulty with ambulation, concern for ALS. Comparison: None. Technique: Multiplanar multisequence imaging of the thoracic and lumbar spine without contrast. FINDINGS: Thoracic spine: Slightly exaggerated thoracic kyphosis without spondylolisthesis. Chronic minimal superior endplate height loss of the T2 and T3 vertebral bodies. Bone marrow signal intensity is within normal limits. No suspicious osseous lesion. No evidence of acute thoracic spine fracture. Thoracic spinal cord appears normal in size and signal intensity. Prevertebral soft tissues are unremarkable. No disc bulge of the thoracic spine. No significant spinal canal or neural foraminal stenosis. Lumbar spine: Lumbar spinal alignment is normal. Vertebral body heights are preserved. Mild degenerative disc changes at L3-L4 with rightward and posterior endplate degenerative changes with associated edema. No suspicious osseous lesion. No evidence of acute lumbosacral spine fracture. Anterior and posterior longitudinal ligament and ligamentum flavum are intact. Lumbar spinal cord is normal in size and signal intensity with the conus medullaris terminating appropriately at L1. Prevertebral soft tissues are unremarkable. T12-L1 through L2-L3: No disc bulge. No spinal canal stenosis. No neural foraminal stenosis. L3-L4: Minimal diffuse disc bulge with minimal spinal canal stenosis. Mild bilateral facet arthropathy with ligamentum flavum thickening.  Mild to moderate right and mild left neural foraminal stenosis. L4-L5: Minimal right paracentral and foraminal disc bulge. No significant spinal canal stenosis. Mild facet arthropathy. Mild right neural foraminal stenosis. L5-S1: No disc bulge or spinal canal stenosis. No neural foraminal stenosis. 1. Thoracic and lumbar spinal cord appear normal in size and signal intensity on noncontrast exam. 2. Degenerative changes as above with mild to moderate right neural foraminal stenosis at L3-L4. MRI LUMB SPINE WO CONT    Result Date: 9/24/2021  Exam: MRI thoracic and lumbar spine without contrast. Indication: Difficulty with ambulation, concern for ALS. Comparison: None. Technique: Multiplanar multisequence imaging of the thoracic and lumbar spine without contrast. FINDINGS: Thoracic spine: Slightly exaggerated thoracic kyphosis without spondylolisthesis. Chronic minimal superior endplate height loss of the T2 and T3 vertebral bodies. Bone marrow signal intensity is within normal limits. No suspicious osseous lesion. No evidence of acute thoracic spine fracture. Thoracic spinal cord appears normal in size and signal intensity. Prevertebral soft tissues are unremarkable. No disc bulge of the thoracic spine. No significant spinal canal or neural foraminal stenosis. Lumbar spine: Lumbar spinal alignment is normal. Vertebral body heights are preserved. Mild degenerative disc changes at L3-L4 with rightward and posterior endplate degenerative changes with associated edema. No suspicious osseous lesion. No evidence of acute lumbosacral spine fracture. Anterior and posterior longitudinal ligament and ligamentum flavum are intact. Lumbar spinal cord is normal in size and signal intensity with the conus medullaris terminating appropriately at L1. Prevertebral soft tissues are unremarkable. T12-L1 through L2-L3: No disc bulge. No spinal canal stenosis. No neural foraminal stenosis.  L3-L4: Minimal diffuse disc bulge with minimal spinal canal stenosis. Mild bilateral facet arthropathy with ligamentum flavum thickening. Mild to moderate right and mild left neural foraminal stenosis. L4-L5: Minimal right paracentral and foraminal disc bulge. No significant spinal canal stenosis. Mild facet arthropathy. Mild right neural foraminal stenosis. L5-S1: No disc bulge or spinal canal stenosis. No neural foraminal stenosis. 1. Thoracic and lumbar spinal cord appear normal in size and signal intensity on noncontrast exam. 2. Degenerative changes as above with mild to moderate right neural foraminal stenosis at L3-L4. Signed:  Ana Burns MD    Part of this note may have been written by using a voice dictation software. The note has been proof read but may still contain some grammatical/other typographical errors.

## 2021-09-25 NOTE — PROGRESS NOTES
Problem: Mobility Impaired (Adult and Pediatric)  Goal: *Acute Goals and Plan of Care (Insert Text)  Outcome: Progressing Towards Goal  Note: STG:  (1.)Mr. Swanson He will move from supine to sit and sit to supine  with MODERATE ASSIST within 4-7 treatment day(s). (2.)Mr. Sky Mclaughlin will transfer from bed to chair and chair to bed with CONTACT GUARD ASSIST using the least restrictive device within 4-7 treatment day(s). (3.)Mr. Jimenez will ambulate with CONTACT GUARD ASSIST for 100 feet with the least restrictive device within 4-7 treatment day(s). ________________________________________________________________________________________________    PHYSICAL THERAPY: Daily Note and AM 9/25/2021  INPATIENT: PT Visit Days : 2  Payor: Rosalino Mcgovern / Plan: [address] [place] [address] HMO / Product Type: Managed Care Medicare /       NAME/AGE/GENDER: Jc Hull is a 78 y.o. male   PRIMARY DIAGNOSIS: Inability to walk [R26.2]  Hoarseness [R49.0] Inability to walk Inability to walk       ICD-10: Treatment Diagnosis:    · Generalized Muscle Weakness (M62.81)  · Difficulty in walking, Not elsewhere classified (R26.2)   Precaution/Allergies:  Augmentin [amoxicillin-pot clavulanate]      ASSESSMENT:     Mr. Swanson He presents with generalized weakness and decreased independence with functional mobility. He will benefit from skilled PT interventions to maximize independence with functional mobility. He lives at home with his wife and was getting home health therapy at the house. Pt states he fell in august and has been receiving home health since then. On his last hospital admission the family did not want SNF but therapy feels that this time he might be a little further below his baseline and recommend SNF, discussed this with SW.     Patient participated well this am.  Seems to be improving with regards to balance and mobility. Decreased assist to get out of bed and decreased assist to ambulate in room/hallway.   Did need frequent cues to stand closer to the walker with ambulation. Progressing well. Seems spouse would like SNF for STR at d/c. This section established at most recent assessment   PROBLEM LIST (Impairments causing functional limitations):  1. Decreased Strength  2. Decreased ADL/Functional Activities  3. Decreased Transfer Abilities  4. Decreased Ambulation Ability/Technique  5. Decreased Balance  6. Decreased Activity Tolerance   INTERVENTIONS PLANNED: (Benefits and precautions of physical therapy have been discussed with the patient.)  1. Balance Exercise  2. Bed Mobility  3. Gait Training  4. Therapeutic Activites  5. Therapeutic Exercise/Strengthening  6. Transfer Training     TREATMENT PLAN: Frequency/Duration: daily for duration of hospital stay  Rehabilitation Potential For Stated Goals: Good     REHAB RECOMMENDATIONS (at time of discharge pending progress):    Placement: It is my opinion, based on this patient's performance to date, that Mr. Pretty Kemp may benefit from intensive therapy at a [facility] after discharge due to the functional deficits listed above that are likely to improve with skilled rehabilitation and concerns that he/she may be unsafe to be unsupervised at home. Equipment:    To be determined              HISTORY:   History of Present Injury/Illness (Reason for Referral):  Copied from MD note: Patient is a 78 y.o. male who presented to the ED for cc persistent right arm weakness, inability to walk, and difficulty with speaking. Neurology has seen and is concerned of motor neuron disease. Patient has medical history of BPH, CKD stage III. Most recent discharge from our facility on 8/14/21. ECHO was normal. Neurology was consulted. MRI Cervical showed mild central canal stenosis, but nothing serious. He was orthostatic positive.  It was recommended to stop Flomax but the patient and wife wanted to talk to Urology first. EMG has been scheduled at [address] for this Monday at [address]. Transportation will need to be arranged so he arrives no later than 730 am.    Past Medical History/Comorbidities:   Mr. Adeline Fraga  has a past medical history of Chronic kidney disease, Elevated PSA, Hearing difficulty, Hepatitis A (2020), Hypercholesterolemia, and Kidney stone. Mr. Adeline Fraga  has a past surgical history that includes hx colonoscopy (05/18/2016). Social History/Living Environment:   Home Environment: Private residence  Living Alone: No  Support Systems: Spouse/Significant Other  Current DME Used/Available at Home: Walker, rolling  Prior Level of Function/Work/Activity:  Pt living at home with spouse, receiving home health therapy     Number of Personal Factors/Comorbidities that affect the Plan of Care: 1-2: MODERATE COMPLEXITY   EXAMINATION:   Most Recent Physical Functioning:   Gross Assessment:  AROM: Generally decreased, functional  Strength: Generally decreased, functional               Posture:     Balance:  Sitting - Static: Good (unsupported)  Sitting - Dynamic: Fair (occasional) (plus)  Standing: Pull to stand; With support Bed Mobility:  Supine to Sit: Moderate assistance  Scooting: Minimum assistance; Moderate assistance  Wheelchair Mobility:     Transfers:  Sit to Stand: Minimum assistance; Moderate assistance  Stand to Sit: Minimum assistance; Moderate assistance  Bed to Chair: Contact guard assistance;Minimum assistance  Gait:     Speed/Amanda: Pace decreased (<100 feet/min)  Step Length: Left shortened;Right shortened  Gait Abnormalities: Decreased step clearance  Distance (ft): 25 Feet (ft)  Assistive Device: Walker, rolling  Ambulation - Level of Assistance: Contact guard assistance;Minimal assistance  Interventions: Safety awareness training;Verbal cues      Body Structures Involved:  1. Muscles Body Functions Affected:  1. Movement Related Activities and Participation Affected:  1. Mobility  2.  Self Care   Number of elements that affect the Plan of Care: 4+: HIGH COMPLEXITY   CLINICAL PRESENTATION:   Presentation: Evolving clinical presentation with changing clinical characteristics: MODERATE COMPLEXITY   CLINICAL DECISION MAKIN [facility] Inpatient Short Form  How much difficulty does the patient currently have. .. Unable A Lot A Little None   1. Turning over in bed (including adjusting bedclothes, sheets and blankets)? [] 1   [x] 2   [] 3   [] 4   2. Sitting down on and standing up from a chair with arms ( e.g., wheelchair, bedside commode, etc.)   [] 1   [] 2   [x] 3   [] 4   3. Moving from lying on back to sitting on the side of the bed? [] 1   [x] 2   [] 3   [] 4   How much help from another person does the patient currently need. .. Total A Lot A Little None   4. Moving to and from a bed to a chair (including a wheelchair)? [] 1   [] 2   [x] 3   [] 4   5. Need to walk in hospital room? [] 1   [] 2   [x] 3   [] 4   6. Climbing 3-5 steps with a railing? [] 1   [x] 2   [] 3   [] 4   © , Trustees of [address] [place] [address], under license to [employer]. All rights reserved      Score:  Initial: 15 Most Recent: X (Date: -- )    Interpretation of Tool:  Represents activities that are increasingly more difficult (i.e. Bed mobility, Transfers, Gait). Medical Necessity:     · Patient is expected to demonstrate progress in   · strength and functional technique  ·  to   · decrease assistance required with functional mobility  · . Reason for Services/Other Comments:  · Patient continues to require skilled intervention due to   · Inability to complete functional mobility independently  · . Use of outcome tool(s) and clinical judgement create a POC that gives a: Clear prediction of patient's progress: LOW COMPLEXITY            TREATMENT:   (In addition to Assessment/Re-Assessment sessions the following treatments were rendered)   Pre-treatment Symptoms/Complaints:  Patient with no complaints.   Pain: Initial:   Pain Intensity 1: 0  Post Session:       Therapeutic Activity: (    25): Therapeutic activities including Bed mobility, Chair transfers, Ambulation on level ground, and LE exercises (seated LAQ and marching, supine ankle pumps and SLR; all x 10 reps B) to improve mobility, strength, balance and coordination. Required minimal assist for Safety awareness training;Verbal cues to promote static and dynamic balance in standing. Braces/Orthotics/Lines/Etc:   · none  Treatment/Session Assessment:    · Response to Treatment: Participated well and seems to be improving. · Interdisciplinary Collaboration:   o Physical Therapist  o Registered Nurse  · After treatment position/precautions:   o Up in chair  o Bed alarm/tab alert on  o Bed/Chair-wheels locked  o Call light within reach   · Compliance with Program/Exercises: Will assess as treatment progresses  · Recommendations/Intent for next treatment session: \"Next visit will focus on advancements to more challenging activities and reduction in assistance provided\".   Total Treatment Duration:  PT Patient Time In/Time Out  Time In: 4725  Time Out: [id number] [address] [place] [address]

## 2021-09-25 NOTE — PROGRESS NOTES
Problem: Self Care Deficits Care Plan (Adult)  Goal: *Acute Goals and Plan of Care (Insert Text)  Outcome: Progressing Towards Goal  Note:   1. Patient will complete feeding with stand by assist to increase self care independence. 2. Patient will complete bathing with contact guard assist to increase self care independence. 3. Patient will improve static standing at edge of bed for 5 minutes to improve independence with transfers and self cares. 4. Patient will tolerate 40 minutes of OT treatment with self incorporated rest breaks to increase activity tolerance to enhance participation in hobbies. 5. Patient will complete all functional transfers with contact guard assist using adaptive equipment as needed. 6. Patient will complete UE exercises with stand by assist to increase overall activity tolerance and strength. Timeframe: 7 visits       OCCUPATIONAL THERAPY: Daily Note and AM 9/25/2021  INPATIENT: OT Visit Days: 2  Payor: Kierra Ponce / Plan: [address] [place] [address] HMO / Product Type: [employer] Care Medicare /      NAME/AGE/GENDER: Jake Rodriges is a 78 y.o. male   PRIMARY DIAGNOSIS:  Inability to walk [R26.2]  Hoarseness [R49.0] Inability to walk Inability to walk       ICD-10: Treatment Diagnosis:    · Pain in Right Shoulder (M25.511)  · Generalized Muscle Weakness (M62.81)  · Other lack of cordination (R27.8)  · Difficulty in walking, Not elsewhere classified (R26.2)   Precautions/Allergies:     Augmentin [amoxicillin-pot clavulanate]      ASSESSMENT:     Mr. Dustin Vazquez presents with progressive neurological weakness and rigidity for months with fall 6 weeks ago causing increased motor loss in RUE, including digits 2 through 5. Patient reports spouse has been providing significant assist at home as well as receiving HHPT. Difficult to understand patient due to dysarthria but can make out certain words at times. Patient needs significant assist with bed mobility due to severe rigidity.  This same rigidity helps support him in stance. Ambulation requiring significantly less assist due to rigid muscles. Patient is Right handed. RUE is painful, stiff, with loss of function and strength. Patient will require significant 24/7 assist at d/c. Initiate OT.      9/25/21 Pt was sitting in chair upon arrival. Pt completed the exercises listed below on B UE's. Pt's R UE still painful and has limited range. Pt completed grooming with set up. Continue POC. This section established at most recent assessment   PROBLEM LIST (Impairments causing functional limitations):  1. Decreased Strength  2. Decreased ADL/Functional Activities  3. Decreased Transfer Abilities  4. Decreased Balance  5. Increased Shortness of Breath   INTERVENTIONS PLANNED: (Benefits and precautions of occupational therapy have been discussed with the patient.)  1. Activities of daily living training  2. Adaptive equipment training  3. Neuromuscular re-eduation  4. Therapeutic activity  5. Therapeutic exercise     TREATMENT PLAN: Frequency/Duration: Follow patient 3x a week to address above goals. Rehabilitation Potential For Stated Goals: [facility] (at time of discharge pending progress):    Placement: It is my opinion, based on this patient's performance to date, that Mr. Lashell Koenig may benefit from participating in 1-2 additional therapy sessions in order to continue to assess for rehab potential and then make recommendation for disposition at discharge. Equipment:    None at this time              OCCUPATIONAL PROFILE AND HISTORY:   History of Present Injury/Illness (Reason for Referral):  See H&P  Past Medical History/Comorbidities:   Mr. Lashell Koenig  has a past medical history of Chronic kidney disease, Elevated PSA, Hearing difficulty, Hepatitis A (2020), Hypercholesterolemia, and Kidney stone. Mr. Lashell Koenig  has a past surgical history that includes hx colonoscopy (05/18/2016).   Social History/Living Environment:   Home Environment: Private residence  Living Alone: No  Support Systems: Spouse/Significant Other  Current DME Used/Available at Home: Walker, rolling  Prior Level of Function/Work/Activity:  Appears needed assist with ADLs and mobility for a few months. Number of Personal Factors/Comorbidities that affect the Plan of Care: Expanded review of therapy/medical records (1-2):  MODERATE COMPLEXITY   ASSESSMENT OF OCCUPATIONAL PERFORMANCE[de-identified]   Activities of Daily Living:   Basic ADLs (From Assessment) Complex ADLs (From Assessment)   Feeding: Minimum assistance  Oral Facial Hygiene/Grooming: Moderate assistance  Bathing: Maximum assistance  Upper Body Dressing: Moderate assistance  Lower Body Dressing: Total assistance     Grooming/Bathing/Dressing Activities of Daily Living   Grooming  Washing Face: Set-up Cognitive Retraining  Safety/Judgement: Fall prevention                       Bed/Mat Mobility  Supine to Sit: Moderate assistance  Sit to Stand: Minimum assistance; Moderate assistance  Stand to Sit: Minimum assistance; Moderate assistance  Bed to Chair: Contact guard assistance;Minimum assistance  Scooting: Minimum assistance; Moderate assistance     Most Recent Physical Functioning:   Gross Assessment:                  Posture:  Posture (WDL): Exceptions to WDL  Posture Assessment: Forward head, Kyphosis, Rounded shoulders  Balance:  Sitting - Static: Good (unsupported)  Sitting - Dynamic: Fair (occasional) (plus)  Standing: Pull to stand; With support Bed Mobility:  Supine to Sit: Moderate assistance  Scooting: Minimum assistance; Moderate assistance  Wheelchair Mobility:     Transfers:  Sit to Stand: Minimum assistance; Moderate assistance  Stand to Sit: Minimum assistance; Moderate assistance  Bed to Chair: Contact guard assistance;Minimum assistance            Patient Vitals for the past 6 hrs:   BP SpO2 Pulse   09/25/21 0628 (!) 180/93 96 % 91       Mental Status  Neurologic State: Alert  Orientation Level: Oriented to person  Cognition: Follows commands  Safety/Judgement: Fall prevention                          Physical Skills Involved:  1. Range of Motion  2. Balance  3. Strength  4. Activity Tolerance Cognitive Skills Affected (resulting in the inability to perform in a timely and safe manner):  1. Executive Function  2. Expression Psychosocial Skills Affected:  1. Habits/Routines  2. Environmental Adaptation  3. Social Interaction   Number of elements that affect the Plan of Care: 3-5:  MODERATE COMPLEXITY   CLINICAL DECISION MAKIN[address] [place] [address] AM-PAC 6 Clicks   Daily Activity Inpatient Short Form  How much help from another person does the patient currently need. .. Total A Lot A Little None   1. Putting on and taking off regular lower body clothing? [x] 1   [] 2   [] 3   [] 4   2. Bathing (including washing, rinsing, drying)? [x] 1   [] 2   [] 3   [] 4   3. Toileting, which includes using toilet, bedpan or urinal?   [x] 1   [] 2   [] 3   [] 4   4. Putting on and taking off regular upper body clothing? [] 1   [x] 2   [] 3   [] 4   5. Taking care of personal grooming such as brushing teeth? [] 1   [x] 2   [] 3   [] 4   6. Eating meals? [] 1   [x] 2   [] 3   [] 4   © , Trustees of [address] [place] [address], under license to [employer]. All rights reserved      Score:  Initial: 9 Most Recent: X (Date: -- )    Interpretation of Tool:  Represents activities that are increasingly more difficult (i.e. Bed mobility, Transfers, Gait). Medical Necessity:     · Skilled intervention continues to be required due to Deficits noted above. Reason for Services/Other Comments:  · Patient continues to require skilled intervention due to   · Dx above  · .    Use of outcome tool(s) and clinical judgement create a POC that gives a: MODERATE COMPLEXITY         TREATMENT:   (In addition to Assessment/Re-Assessment sessions the following treatments were rendered)     Pre-treatment Symptoms/Complaints:    Pain: Initial: Pain Intensity 1: 0  Post Session:  1     Therapeutic Exercise: ( 10):  Exercises per grid below to improve mobility and strength. Required min verbal and manual cues to promote proper body posture and promote proper body mechanics. Progressed range and repetitions as indicated. R UE- AAROM with pain and limited range   L UE Date:  9/25/21 Date:   Date:     Activity/Exercise Parameters Parameters Parameters   Shoulder flex/ex 10 reps      Shoulder hor abd/add 10 reps      Elbow flex/ ex 10 reps      Wrist flex/ex 10 reps      Digit flex/ex 10 reps     Wash face with set up  1                Braces/Orthotics/Lines/Etc:   ·    Treatment/Session Assessment:    · Response to Treatment:  Good, sitting up in recliner  · Interdisciplinary Collaboration:   o Certified Occupational Therapy Assistant  o Registered Nurse  · After treatment position/precautions:   o Up in chair  o Bed/Chair-wheels locked  o Call light within reach  o RN notified  o Family at bedside   · Compliance with Program/Exercises: Compliant all of the time, Will assess as treatment progresses. · Recommendations/Intent for next treatment session: \"Next visit will focus on advancements to more challenging activities and reduction in assistance provided\".   Total Treatment Duration:  OT Patient Time In/Time Out  Time In: 1026  Time Out: Maxine Quevedo

## 2021-09-26 NOTE — PROGRESS NOTES
Problem: Mobility Impaired (Adult and Pediatric)  Goal: *Acute Goals and Plan of Care (Insert Text)  Outcome: Progressing Towards Goal  Note: STG:  (1.)Mr. Troy Romero will move from supine to sit and sit to supine  with MODERATE ASSIST within 4-7 treatment day(s). (2.)Mr. Troy Romero will transfer from bed to chair and chair to bed with CONTACT GUARD ASSIST using the least restrictive device within 4-7 treatment day(s). (3.)Mr. Jimenez will ambulate with CONTACT GUARD ASSIST for 100 feet with the least restrictive device within 4-7 treatment day(s). ________________________________________________________________________________________________    PHYSICAL THERAPY: Daily Note and AM 9/26/2021  INPATIENT: PT Visit Days : 3  Payor: Erick Euceda / Plan: [address] [place] [address] HMO / Product Type: Managed Care Medicare /       NAME/AGE/GENDER: Xavi Quinn is a 78 y.o. male   PRIMARY DIAGNOSIS: Inability to walk [R26.2]  Hoarseness [R49.0] Inability to walk Inability to walk       ICD-10: Treatment Diagnosis:    · Generalized Muscle Weakness (M62.81)  · Difficulty in walking, Not elsewhere classified (R26.2)   Precaution/Allergies:  Augmentin [amoxicillin-pot clavulanate]      ASSESSMENT:     Mr. Troy Romero presents with generalized weakness and decreased independence with functional mobility. He will benefit from skilled PT interventions to maximize independence with functional mobility. He lives at home with his wife and was getting home health therapy at the house. Pt states he fell in august and has been receiving home health since then. On his last hospital admission the family did not want SNF but therapy feels that this time he might be a little further below his baseline and recommend SNF, discussed this with SW.     Patient participated well this am but needing some encouragement. Already up in the chair with wife at bedside.   Patient initially stating he couldn't walk with therapy because his R hand was bothering him more today-appeared a little more contracted with 3rd-5th digits. Suggested a platform walker and patient in agreement. Needed mod A for sit to stand from chair. Kept weight shifted posteriorly initially upon standing but improved center of gravity. He needed a bit more assist with gait today in order to direct the walker-difficulty navigating with R arm in platform. Patient able to move B LEs well against gravity in sitting but R may be a bit less coordinated. Did make towel roll to place in R hand to facilitate some opening. Nerve conduction study scheduled for tomorrow afternoon. This section established at most recent assessment   PROBLEM LIST (Impairments causing functional limitations):  1. Decreased Strength  2. Decreased ADL/Functional Activities  3. Decreased Transfer Abilities  4. Decreased Ambulation Ability/Technique  5. Decreased Balance  6. Decreased Activity Tolerance   INTERVENTIONS PLANNED: (Benefits and precautions of physical therapy have been discussed with the patient.)  1. Balance Exercise  2. Bed Mobility  3. Gait Training  4. Therapeutic Activites  5. Therapeutic Exercise/Strengthening  6. Transfer Training     TREATMENT PLAN: Frequency/Duration: daily for duration of hospital stay  Rehabilitation Potential For Stated Goals: Good     REHAB RECOMMENDATIONS (at time of discharge pending progress):    Placement: It is my opinion, based on this patient's performance to date, that Mr. Sandy Tejeda may benefit from intensive therapy at a [address] [place] [address] after discharge due to the functional deficits listed above that are likely to improve with skilled rehabilitation and concerns that he/she may be unsafe to be unsupervised at home.   Equipment:    To be determined              HISTORY:   History of Present Injury/Illness (Reason for Referral):  Copied from MD note: Patient is a 78 y.o. male who presented to the ED for cc persistent right arm weakness, inability to walk, and difficulty with speaking. Neurology has seen and is concerned of motor neuron disease. Patient has medical history of BPH, CKD stage III. Most recent discharge from our facility on 8/14/21. ECHO was normal. Neurology was consulted. MRI Cervical showed mild central canal stenosis, but nothing serious. He was orthostatic positive. It was recommended to stop Flomax but the patient and wife wanted to talk to Urology first. EMG has been scheduled at [address] for this Monday at [address]. Transportation will need to be arranged so he arrives no later than 730 am.    Past Medical History/Comorbidities:   Mr. Adeline Fraga  has a past medical history of Chronic kidney disease, Elevated PSA, Hearing difficulty, Hepatitis A (2020), Hypercholesterolemia, and Kidney stone. Mr. Adeline Fraga  has a past surgical history that includes hx colonoscopy (05/18/2016). Social History/Living Environment:   Home Environment: Private residence  Living Alone: No  Support Systems: Spouse/Significant Other  Current DME Used/Available at Home: Walker, rolling  Prior Level of Function/Work/Activity:  Pt living at home with spouse, receiving home health therapy     Number of Personal Factors/Comorbidities that affect the Plan of Care: 1-2: MODERATE COMPLEXITY   EXAMINATION:   Most Recent Physical Functioning:   Gross Assessment:  AROM: Generally decreased, functional  Strength: Generally decreased, functional               Posture:     Balance:  Sitting - Static: Good (unsupported)  Sitting - Dynamic: Fair (occasional)  Standing: Pull to stand; With support (posterior WS initially upon standing)  Standing - Static: Constant support  Standing - Dynamic : Constant support Bed Mobility:     Wheelchair Mobility:     Transfers:  Sit to Stand: Moderate assistance  Stand to Sit: Minimum assistance; Moderate assistance  Gait:     Speed/Amanda: Slow  Step Length: Left shortened;Right shortened  Gait Abnormalities: Decreased step clearance  Distance (ft): 25 Feet (ft)  Assistive Device: Walker, rolling (R platform attachment)  Ambulation - Level of Assistance: Minimal assistance (to direct walker)  Interventions: Manual cues; Safety awareness training;Verbal cues      Body Structures Involved:  1. Muscles Body Functions Affected:  1. Movement Related Activities and Participation Affected:  1. Mobility  2. Self Care   Number of elements that affect the Plan of Care: 4+: HIGH COMPLEXITY   CLINICAL PRESENTATION:   Presentation: Evolving clinical presentation with changing clinical characteristics: MODERATE COMPLEXITY   CLINICAL DECISION MAKIN [facility] Inpatient Short Form  How much difficulty does the patient currently have. .. Unable A Lot A Little None   1. Turning over in bed (including adjusting bedclothes, sheets and blankets)? [] 1   [x] 2   [] 3   [] 4   2. Sitting down on and standing up from a chair with arms ( e.g., wheelchair, bedside commode, etc.)   [] 1   [] 2   [x] 3   [] 4   3. Moving from lying on back to sitting on the side of the bed? [] 1   [x] 2   [] 3   [] 4   How much help from another person does the patient currently need. .. Total A Lot A Little None   4. Moving to and from a bed to a chair (including a wheelchair)? [] 1   [] 2   [x] 3   [] 4   5. Need to walk in hospital room? [] 1   [] 2   [x] 3   [] 4   6. Climbing 3-5 steps with a railing? [] 1   [x] 2   [] 3   [] 4   © , Trustees of [facility] [employer], under license to [employer]. All rights reserved      Score:  Initial: 15 Most Recent: X (Date: -- )    Interpretation of Tool:  Represents activities that are increasingly more difficult (i.e. Bed mobility, Transfers, Gait). Medical Necessity:     · Patient is expected to demonstrate progress in   · strength and functional technique  ·  to   · decrease assistance required with functional mobility  · .   Reason for Services/Other Comments:  · Patient continues to require skilled intervention due to   · Inability to complete functional mobility independently  · . Use of outcome tool(s) and clinical judgement create a POC that gives a: Clear prediction of patient's progress: LOW COMPLEXITY            TREATMENT:   (In addition to Assessment/Re-Assessment sessions the following treatments were rendered)   Pre-treatment Symptoms/Complaints:  Patient reports more difficulty with R hand today. Pain: Initial:   Pain Intensity 1: 3  Pain Location 1: Hand  Pain Orientation 1: Right  Post Session:  3/10       Therapeutic Activity: (    25 minutes): Therapeutic activities including Chair transfers, Ambulation on level ground, and LE exercises (seated LAQ and marching) to improve mobility, strength, balance and coordination. Required minimal assist for Manual cues; Safety awareness training;Verbal cues to promote static and dynamic balance in standing. Braces/Orthotics/Lines/Etc:   · none  Treatment/Session Assessment:    · Response to Treatment: Participated well but struggled more with walking with platform walker. · Interdisciplinary Collaboration:   o Physical Therapist  o Registered Nurse  · After treatment position/precautions:   o Up in chair  o Bed/Chair-wheels locked  o Caregiver at bedside  o Call light within reach   · Compliance with Program/Exercises: Will assess as treatment progresses  · Recommendations/Intent for next treatment session: \"Next visit will focus on advancements to more challenging activities and reduction in assistance provided\".   Total Treatment Duration:  PT Patient Time In/Time Out  Time In: 1020  Time Out: [address] [facility]

## 2021-09-26 NOTE — PROGRESS NOTES
Patient seen remotely by teledoc video platform. Wife at bedside no new complaints. Continues to complain of right arm weakness and hoarseness. Appears to be tolerating a dysphagia 2 diet well. Nerve conduction study pending tomorrow.   Dr. My Sherman Will have assume follow-up tomorrow

## 2021-09-26 NOTE — PROGRESS NOTES
Hospitalist Progress Note   Admit Date:  2021  7:29 PM   Name:  Trish Estevez   Age:  78 y.o. Sex:  male  :  1940   MRN:  [id number]   Room:  [facility]/    Presenting Complaint: No chief complaint on file. Initial Admission Diagnosis: Inability to walk [R26.2]  Hoarseness [R49.0]     Assessment and Plan:   # Progressive weakness              - Seen by Neurology at [facility] ER on , suspicious for motor neuron disease. MRI T/L spine largely unremarkable; C-spine with some very mild cord narrowing. EMG planned for  at [address] [place] [address] Neurology input otherwise. Discharge Planning: Placement. Diet:  ADULT DIET Dysphagia - Minced & Moist; Mildly Thick (Nectar); Liquids by Spoon only  DVT PPx: SCDs  Code status: DNR    Hospital Problems as of 2021 Date Reviewed: 2021        Codes Class Noted - Resolved POA    Hoarseness ICD-10-CM: R49.0  ICD-9-CM: 784.42  2021 - Present Yes        * (Principal) Inability to walk ICD-10-CM: R26.2  ICD-9-CM: 719.7  2021 - Present Yes              Hospital Course:   Mr. Drew Carreon is a nice 77 y/o WM who presented to the ER at [facility] on  with ongoing R sided weakness, hoarse voice and trouble ambulating. He was hospitalized last month and had an unremarkable CVA work up. He was discharged home. Went to a [facility] ER recently and ultimately discharged from there. He saw his PCP on  due to ongoing weakness, drooling and difficulty with speech. His PCP called and spoke to Dr. Vero Tang who advised that he go to the ER for further evaluation. He was seen by Neurology with concerns for motor neuron disease. C-spine MRI showed mild central canal narrowing w/o cord signal changes. Since no beds were available at [facility] he was transferred to [facility] for further management. Thoracolumbar MRIs are pending.  PT/OT have been consulted and per Neurology he has an EMG scheduled at 2pm on  at the [address] Neuro office.     24hr Events/Subjective (21):   : Up to chair, voice still hoarse and garbled but he thinks drainage is better. Still c/o R hand pain that developed a few weeks ago after a fall. Wife at bedside. EMG tomorrow. No chest pain or SOB otherwise. Objective:     Patient Vitals for the past 24 hrs:   Temp Pulse Resp BP SpO2   09/26/21 1200 97.7 °F (36.5 °C) 77 18 117/71 97 %   09/26/21 0811 98 °F (36.7 °C) 68 18 (!) 182/82 95 %   09/26/21 0257 98.4 °F (36.9 °C) 73 19 (!) 149/76 95 %   09/25/21 2305 98.3 °F (36.8 °C) 80 16 (!) 158/94 94 %   09/25/21 1915 98.2 °F (36.8 °C) 63 16 (!) 151/79 93 %   09/25/21 1539 98 °F (36.7 °C) 70 18 131/79 96 %     Oxygen Therapy  O2 Sat (%): 97 % (09/26/21 1200)  O2 Device: None (Room air) (09/24/21 1901)    Estimated body mass index is 26.85 kg/m² as calculated from the following:    Height as of 8/12/21: 6' (1.829 m). Weight as of 8/12/21: 89.8 kg (198 lb). No intake or output data in the 24 hours ending 09/26/21 1237      General:    Well nourished. No overt distress. Appears stated age. Head:  Normocephalic, atraumatic  Eyes:  Sclerae appear normal.  Pupils equally round. HENT:  Nares appear normal, no drainage. Moist mucous membranes  Neck:  No restricted ROM. Trachea midline  CV:   RRR. No m/r/g. No JVD  Lungs:   CTAB. No wheezing, rhonchi, or rales. Appears even, unlabored. Abdomen: Bowel sounds present. Soft, nontender, nondistended. Extremities: Warm and dry. No cyanosis or clubbing. No edema. Skin:     No rashes. Normal turgor. Normal coloration  Neuro:  Cranial nerves II-XII grossly intact. R sided weakness. Garbled speech. DONYA. Moves all extremities spontaneously but R sided weakness. Psych:  Normal mood and affect. Alert and oriented x3    Data Ordered and Personally Reviewed:    Last 24hr Labs:  No results found for this or any previous visit (from the past 24 hour(s)).     All Micro Results     None          Current Meds:  Current Facility-Administered Medications Medication Dose Route Frequency    cetirizine (ZYRTEC) tablet 10 mg  10 mg Oral DAILY    acetaminophen (TYLENOL) tablet 650 mg  650 mg Oral Q6H PRN    Or    acetaminophen (TYLENOL) suppository 650 mg  650 mg Rectal Q6H PRN    ondansetron (ZOFRAN ODT) tablet 4 mg  4 mg Oral Q8H PRN    Or    ondansetron (ZOFRAN) injection 4 mg  4 mg IntraVENous Q6H PRN    polyethylene glycol (MIRALAX) packet 17 g  17 g Oral DAILY PRN    aspirin chewable tablet 81 mg  81 mg Oral DAILY       Other Studies:    No results found. Signed:  Dorinda Viera MD    Part of this note may have been written by using a voice dictation software. The note has been proof read but may still contain some grammatical/other typographical errors.

## 2021-09-27 PROBLEM — R09.89 SUSPECTED PULMONARY ASPIRATION OF FOOD: Status: ACTIVE | Noted: 2021-01-01

## 2021-09-27 NOTE — PROGRESS NOTES
Care Management Interventions  PCP Verified by CM: Yes  Mode of Transport at Discharge: BLS  Transition of Care Consult (CM Consult): SNF (current with Interim)  Partner SNF: Yes  Physical Therapy Consult: Yes  Occupational Therapy Consult: Yes  Support Systems: Spouse/Significant Other  Confirm Follow Up Transport: Other (see comment) (stretcher)  The Plan for Transition of Care is Related to the Following Treatment Goals : improve mobility  The Patient and/or Patient Representative was Provided with a Choice of Provider and Agrees with the Discharge Plan?: Yes  Freedom of Choice List was Provided with Basic Dialogue that Supports the Patient's Individualized Plan of Care/Goals, Treatment Preferences and Shares the Quality Data Associated with the Providers?: Yes  Discharge Location  Discharge Placement: Skilled nursing facility    No beds at [facility] and [facility]. Pt/wife now asking for [facility] in [place]. Referral faxed to [place] in Admission for their review. I hope to have approval for admission by end of day. PPD and COVID initiated today Mon 9-27.   I anticipate NH transfer in 1-2 days

## 2021-09-27 NOTE — PROGRESS NOTES
Hospitalist Progress Note   Admit Date:  2021  7:29 PM   Name:  Fausto Noland   Age:  78 y.o. Sex:  male  :  1940   MRN:  [id number]   Room:  [facility]/01    Presenting Complaint: No chief complaint on file. Initial Admission Diagnosis: Inability to walk [R26.2]  Hoarseness [R49.0]     Assessment and Plan:   # Progressive weakness              - Seen by Neurology at [facility] , suspicious for motor neuron disease. MRI T/L spine largely unremarkable; C-spine with some very mild cord narrowing. Neurology recs appreciated. He has EMG scheduled this afternoon at the  Neurology office, charge and unit supervisor aware. # Probable aspiration   - Doesn't like his diet, previously told ST did not want TFs. Could consider MBS but if he is unwilling to modify his diet based on the results then it would not be useful. # R hand pain   - S/p fall several weeks ago. X-rays  were unremarkable. Discharge Planning: Placement. Diet:  ADULT DIET Easy to Chew; Mildly Thick (Nectar); Liquids by Spoon only  DVT PPx: SCDs  Code status: DNR    Hospital Problems as of 2021 Date Reviewed: 2021        Codes Class Noted - Resolved POA    Suspected pulmonary aspiration of food ICD-10-CM: T17.820A  ICD-9-CM: 934.8  2021 - Present Unknown        Hoarseness ICD-10-CM: R49.0  ICD-9-CM: 784.42  2021 - Present Yes        * (Principal) Inability to walk ICD-10-CM: R26.2  ICD-9-CM: 719.7  2021 - Present Yes              Hospital Course:   Mr. Julio Field is a nice 77 y/o WM who presented to the ER at [facility] on  with ongoing R sided weakness, hoarse voice and trouble ambulating. He was hospitalized last month and had an unremarkable CVA work up. He was discharged home. Went to a [facility] ER recently and ultimately discharged from there. He saw his PCP on  due to ongoing weakness, drooling and difficulty with speech.  His PCP called and spoke to Dr. Carmen Shah who advised that he go to the ER for further evaluation. He was seen by Neurology with concerns for motor neuron disease. C-spine MRI showed mild central canal narrowing w/o cord signal changes. Since no beds were available at [facility] he was transferred to [facility] for further management. Thoracolumbar MRIs are pending. PT/OT have been consulted and per Neurology he has an EMG scheduled at 2pm on Monday 9/27 at the [address] Neuro office.     24hr Events/Subjective (09/27/21):   9/26: Up to chair again, wife at bedside. Seen by ST earlier today, c/o food consistency and would like it to be changed. Not eating much because he doesn't like that. Discussed with patient and wife the probability of aspiration and increased risks of different consistencies which they understand. He has EMG this afternoon, spoke to charge and unit supervisor to help with logistics. No chest pain or SOB. Still weak. Objective:     Patient Vitals for the past 24 hrs:   Temp Pulse Resp BP SpO2   09/27/21 0724 97.9 °F (36.6 °C) 72 17 139/86 97 %   09/27/21 0314 98.1 °F (36.7 °C) 75 18 (!) 163/89 94 %   09/26/21 2357 98.3 °F (36.8 °C) 65 18 (!) 144/77 95 %   09/26/21 1914 98.2 °F (36.8 °C) 78 18 114/74 95 %   09/26/21 1617 97.5 °F (36.4 °C) 71 18 116/74 97 %   09/26/21 1200 97.7 °F (36.5 °C) 77 18 117/71 97 %     Oxygen Therapy  O2 Sat (%): 97 % (09/27/21 0724)  O2 Device: None (Room air) (09/24/21 1901)    Estimated body mass index is 26.85 kg/m² as calculated from the following:    Height as of 8/12/21: 6' (1.829 m). Weight as of 8/12/21: 89.8 kg (198 lb). Intake/Output Summary (Last 24 hours) at 9/27/2021 1040  Last data filed at 9/27/2021 0559  Gross per 24 hour   Intake --   Output 250 ml   Net -250 ml         General:    Well nourished. No overt distress. Appears stated age. Garbled speech. Head:  Normocephalic, atraumatic  Eyes:  Sclerae appear normal.  Pupils equally round. HENT:  Nares appear normal, no drainage. Moist mucous membranes  Neck:  No restricted ROM. Trachea midline  CV:   RRR. No m/r/g. No JVD  Lungs:   CTAB. No wheezing, rhonchi, or rales. Appears even, unlabored  Abdomen: Bowel sounds present. Soft, nontender, nondistended. Extremities: Warm and dry. No cyanosis or clubbing. No edema. Skin:     No rashes. Normal turgor. Normal coloration  Neuro:  Cranial nerves II-XII grossly intact. Sensation intact. Speech remains garbled. RUE weakness. Psych:  Normal mood and affect. Alert and oriented x3    Data Ordered and Personally Reviewed:    Last 24hr Labs:  No results found for this or any previous visit (from the past 24 hour(s)). All Micro Results     None          Current Meds:  Current Facility-Administered Medications   Medication Dose Route Frequency    cetirizine (ZYRTEC) tablet 10 mg  10 mg Oral DAILY    acetaminophen (TYLENOL) tablet 650 mg  650 mg Oral Q6H PRN    Or    acetaminophen (TYLENOL) suppository 650 mg  650 mg Rectal Q6H PRN    ondansetron (ZOFRAN ODT) tablet 4 mg  4 mg Oral Q8H PRN    Or    ondansetron (ZOFRAN) injection 4 mg  4 mg IntraVENous Q6H PRN    polyethylene glycol (MIRALAX) packet 17 g  17 g Oral DAILY PRN    aspirin chewable tablet 81 mg  81 mg Oral DAILY       Other Studies:    XR HAND RT MIN 3 V    Result Date: 9/26/2021  EXAM: 3 views of the right hand. INDICATION: Fall with right hand pain. COMPARISON: None. FINDINGS: Ulnar negative variance. Chronic appearing round ossicle adjacent to the ulnar styloid. No evidence of acute fracture or dislocation. Diffuse osteopenia. No radiopaque foreign body. 1. No evidence of acute fracture or dislocation. If there is persistent pain in the region of the anatomic snuffbox consider repeat wrist radiographs in 7-10 days to exclude an occult scaphoid fracture. Signed:  Edelmiro Lefort, MD    Part of this note may have been written by using a voice dictation software.   The note has been proof read but may still contain some grammatical/other typographical errors.

## 2021-09-27 NOTE — PROGRESS NOTES
LTG: Patient will tolerate least restrictive diet without overt signs or symptoms of airway compromise. STG: Patient will tolerate  easy to chew diet and mildly thick liquids by teaspoon without overt signs or symptoms of airway compromise. Advanced 9/27 per patient/family request  STG: Patient will participate in modified barium swallow study as clinically indicated. SPEECH LANGUAGE PATHOLOGY: DYSPHAGIA- Daily Note 1    NAME/AGE/GENDER: Lady Valero is a 78 y.o. male  DATE: 9/27/2021  PRIMARY DIAGNOSIS: Inability to walk [R26.2]  Hoarseness [R49.0]      ICD-10: Treatment Diagnosis: R13.12 Dysphagia, Oropharyngeal Phase    RECOMMENDATIONS   DIET: With known aspiration risk:   Easy to chew   mildly thick liquids (NECTAR) by teaspoon only    MEDICATIONS: Crushed in applesauce if appropriate      PRECAUTIONS, MODIFICATIONS, AND STRATEGIES  · Slow rate of intake  · Small bites/sips  · Upright at 90 degrees during meal  · MILDLY THICK LIQUIDS/NECTAR BY TEASPOON ONLY     RECOMMENDATIONS FOR CONTINUED SPEECH THERAPY:   YES: Anticipate need for ongoing speech therapy during this hospitalization and at next level of care. ASSESSMENT   Patient continues to present with significant oropharyngeal dysphagia. Limited assessment this date due to minimal po intake. Oral holding/tongue pumping persists with liquids. Immediate cough post swallow with mildly thick liquids by single cup sips and delayed cough post swallow x1 with mildly thick by teaspoon. Patient and patient's wife requesting diet advancement to regular diet. Patient consumed solid consistency cracker with delayed, but functional mastication. Educated patient and patient's wife on patient ongoing risk for aspiration with all po intake and potential risk associated with aspiration. Both expressed understanding and wish for patient to continue oral diet with known risk at this time.  Patient and patient's wife report they do not think that they want to pursue alternate means of nutrition/hydration, but would like modified barium swallow study to be repeated in order to make that decision. Patient reports he is ok having liquids modified, but does not want puree or soft diet. Recommended diet with known risk for aspiration: easy to chew diet and mildly thick liquids (NECTAR) by teaspoon only. Modified barium swallow study tomorrow, 9/28, to objectively assess oropharyngeal swallow function and assist with determining goals of care. EDUCATION:  · Recommendations discussed with Patient, Family, MD and RN    REHABILITATION POTENTIAL FOR STATED GOALS: Fair    PLAN    FREQUENCY/DURATION:   Continue to follow patient 3 times a week for duration of hospital stay to address above goals. Recommendations for next treatment session: Next treatment session will address Modified Barium Swallow Study    CONTINUATION OF SKILLED SERVICES/MEDICAL NECESSITY:   Patient is expected to demonstrate progress in  swallow strength, swallow timeliness, swallow function, diet tolerance and swallow safety in order to  improve swallow safety, work toward diet advancement and decrease aspiration risk.  Patient continues to require skilled intervention due to dysphagia. SUBJECTIVE   Patient alert upright in bedside chair for session. Patient's wife feeding him peaches and patient consuming liquids by cup upon arrival. Patient's speech is dysarthric with minimal intelligibility. Voice is hoarse/harsh and sound wet at times. Oxygen Device: room air  Pain: Pain Scale 1: Numeric (0 - 10)  Pain Intensity 1: 0    History of Present Injury/Illness: Mr. Weber Dakins  has a past medical history of Chronic kidney disease, Elevated PSA, Hearing difficulty, Hepatitis A (2020), Hypercholesterolemia, and Kidney stone. Nehemias Conner He also  has a past surgical history that includes hx colonoscopy (05/18/2016).  PRECAUTIONS/ALLERGIES: Augmentin [amoxicillin-pot clavulanate]     Problem List:  (Impairments causing functional limitations):  1. Oropharyngeal dysphagia    Previous Dysphagia: YES Modified barium swallow study completed 8/3/21 with results as follows:     \"Mr. Bright Alvarenga presents with mild-moderate oropharyngeal dysphagia. Oral dysphagia with reduced tongue control impacting oral containment and lingual motion/transport slowed and repetitive. Premature spillage into pharynx to laryngeal vestibule and pyriforms with non clearing penetration and SILENT aspiration of thin liquids during the swallow with thin liquid via cup and delayed swallow initiation at pyriforms resulting in non clearing shallow penetration with mildly thick liquids via straw. There was no new penetration or aspiration with mildly thick liquids via cup, moderately thick liquids via cup and straw, pudding, and chewables.      Recommend easy to chew diet and MILDLY THICK LIQUIDS via cup only. No straws. \"        Diet Prior to Evaluation: minced/moist diet and thin liquids    Orientation:  Person  Place      OBJECTIVE   Dysphagia treatment:   Patient seen for diet tolerance/po trials. Vocal quality hoarse/harsh and intermittently sounds wet. Consumed trials of mildly thick by teaspoon x6, mildly thick by cup x1, and solid consistency x2. Patient declining minced/moist diet and requesting upgrade. Mild-moderate oral holding and tongue pumping with mildly thick by teaspoon and cup. Immediate cough post swallow with mildly thick liquids by single cup sips. Delayed cough post swallow with mildly thick by teaspoon on 1 out of 6 trials. Mildly delayed mastication of solid cracker, but adequate oral clearance achieved.      Tool Used: Dysphagia Outcome and Severity Scale (ANITA)    Score Comments   Normal Diet  [] 7 With no strategies or extra time needed   Functional Swallow  [] 6 May have mild oral or pharyngeal delay   Mild Dysphagia  [] 5 Which may require one diet consistency restricted    Mild-Moderate Dysphagia  [] 4 With 1-2 diet consistencies restricted   Moderate Dysphagia  [x] 3 With 2 or more diet consistencies restricted   Moderate-Severe Dysphagia  [] 2 With partial PO strategies (trials with ST only)   Severe Dysphagia  [] 1 With inability to tolerate any PO safely      Score:  Initial: 3 Most Recent: 2 (Date 09/27/21 )   Interpretation of Tool: The Dysphagia Outcome and Severity Scale (ANITA) is a simple, easy-to-use, 7-point scale developed to systematically rate the functional severity of dysphagia based on objective assessment and make recommendations for diet level, independence level, and type of nutrition. Current Medications:   No current facility-administered medications on file prior to encounter. Current Outpatient Medications on File Prior to Encounter   Medication Sig Dispense Refill    finasteride (PROSCAR) 5 mg tablet Take 1 Tablet by mouth daily. TAKE ONE TABLET BY MOUTH EVERY DAY 90 Tablet 1    tamsulosin (FLOMAX) 0.4 mg capsule Take 1 Capsule by mouth daily. 90 Capsule 1    predniSONE (DELTASONE) 20 mg tablet Take 20 mg by mouth two (2) times a day for 5 days. 10 Tablet 0    aspirin 81 mg chewable tablet Take 1 Tablet by mouth daily.  30 Tablet 2        INTERDISCIPLINARY COLLABORATION: RN and MD    After treatment position/precautions:  · Upright in chair  · Call light within reach  · Wife at bedside  · RN notified    Total Treatment Duration:   Time In: 7846  Time Out: [address] [facility] [place] [address], [address] [facility]

## 2021-09-27 NOTE — PROGRESS NOTES
Problem: Self Care Deficits Care Plan (Adult)  Goal: *Acute Goals and Plan of Care (Insert Text)  Outcome: Progressing Towards Goal  Note:   1. Patient will complete feeding with stand by assist to increase self care independence. 2. Patient will complete bathing with contact guard assist to increase self care independence. 3. Patient will improve static standing at edge of bed for 5 minutes to improve independence with transfers and self cares. 4. Patient will tolerate 40 minutes of OT treatment with self incorporated rest breaks to increase activity tolerance to enhance participation in hobbies. 5. Patient will complete all functional transfers with contact guard assist using adaptive equipment as needed. 6. Patient will complete UE exercises with stand by assist to increase overall activity tolerance and strength. Timeframe: 7 visits       OCCUPATIONAL THERAPY: Daily Note and AM 9/27/2021  INPATIENT: OT Visit Days: 3  Payor: Matthew Ernst / Plan: [address] [place] [address] HMO / Product Type: [employer] /      NAME/AGE/GENDER: Radha Zuñiga is a 78 y.o. male   PRIMARY DIAGNOSIS:  Inability to walk [R26.2]  Hoarseness [R49.0] Inability to walk Inability to walk       ICD-10: Treatment Diagnosis:    · Pain in Right Shoulder (M25.511)  · Generalized Muscle Weakness (M62.81)  · Other lack of cordination (R27.8)  · Difficulty in walking, Not elsewhere classified (R26.2)   Precautions/Allergies:     Augmentin [amoxicillin-pot clavulanate]      ASSESSMENT:     Mr. Sandy Tejeda presents with progressive neurological weakness and rigidity for months with fall 6 weeks ago causing increased motor loss in RUE, including digits 2 through 5. Patient reports spouse has been providing significant assist at home as well as receiving HHPT. Difficult to understand patient due to dysarthria but can make out certain words at times. Patient needs significant assist with bed mobility due to severe rigidity.  This same rigidity helps support him in stance. Ambulation requiring significantly less assist due to rigid muscles. Patient is Right handed. RUE is painful, stiff, with loss of function and strength. Patient will require significant 24/7 assist at d/c. Initiate OT.      9/27/2021 Patient wife in room for ADL tx/training. Patient requires significant cues and support at edge of bed to maintain balance during ADL. Briefs were soiled changed despite patient claiming they were clean. Used platform walker to get to chair, lots of cues for balance. RUE exercises caused too much pain to be effective and encouraged to use towel roll to prevent further contracture of right digits. This section established at most recent assessment   PROBLEM LIST (Impairments causing functional limitations):  1. Decreased Strength  2. Decreased ADL/Functional Activities  3. Decreased Transfer Abilities  4. Decreased Balance  5. Increased Shortness of Breath   INTERVENTIONS PLANNED: (Benefits and precautions of occupational therapy have been discussed with the patient.)  1. Activities of daily living training  2. Adaptive equipment training  3. Neuromuscular re-eduation  4. Therapeutic activity  5. Therapeutic exercise     TREATMENT PLAN: Frequency/Duration: Follow patient 3x a week to address above goals. Rehabilitation Potential For Stated Goals: [facility] (at time of discharge pending progress):    Placement: It is my opinion, based on this patient's performance to date, that Mr. Sandy Tejeda may benefit from participating in 1-2 additional therapy sessions in order to continue to assess for rehab potential and then make recommendation for disposition at discharge.   Equipment:    None at this time              OCCUPATIONAL PROFILE AND HISTORY:   History of Present Injury/Illness (Reason for Referral):  See H&P  Past Medical History/Comorbidities:   Mr. Sandy Tejeda  has a past medical history of Chronic kidney disease, Elevated PSA, Hearing difficulty, Hepatitis A (2020), Hypercholesterolemia, and Kidney stone. Mr. Pretty Kmep  has a past surgical history that includes hx colonoscopy (05/18/2016). Social History/Living Environment:   Home Environment: Private residence  Living Alone: No  Support Systems: Spouse/Significant Other  Current DME Used/Available at Home: Walker, rolling  Prior Level of Function/Work/Activity:  Appears needed assist with ADLs and mobility for a few months. Number of Personal Factors/Comorbidities that affect the Plan of Care: Expanded review of therapy/medical records (1-2):  MODERATE COMPLEXITY   ASSESSMENT OF OCCUPATIONAL PERFORMANCE[de-identified]   Activities of Daily Living:   Basic ADLs (From Assessment) Complex ADLs (From Assessment)   Feeding: Independent  Oral Facial Hygiene/Grooming: Setup  Bathing: Maximum assistance  Upper Body Dressing: Maximum assistance  Lower Body Dressing: Total assistance  Toileting: Total assistance     Grooming/Bathing/Dressing Activities of Daily Living                             Bed/Mat Mobility  Supine to Sit: Moderate assistance  Sit to Stand: Moderate assistance  Stand to Sit: Minimum assistance; Moderate assistance  Bed to Chair: Moderate assistance  Scooting: Maximum assistance     Most Recent Physical Functioning:   Gross Assessment:                  Posture:  Posture (WDL): Exceptions to WDL  Posture Assessment: Forward head, Kyphosis, Rounded shoulders  Balance:  Sitting - Static: Good (unsupported)  Sitting - Dynamic: Fair (occasional)  Standing: Pull to stand; With support  Standing - Static: Constant support  Standing - Dynamic : Constant support Bed Mobility:  Supine to Sit: Moderate assistance  Scooting: Maximum assistance  Wheelchair Mobility:     Transfers:  Sit to Stand: Moderate assistance  Stand to Sit: Minimum assistance; Moderate assistance  Bed to Chair: Moderate assistance            Patient Vitals for the past 6 hrs:   BP BP Patient Position SpO2 Pulse   09/27/21 0724 139/86 At rest;Supine 97 % 72   21 1031 (!) 142/78 At rest;Supine 94 % 71       Mental Status  Neurologic State: Alert  Orientation Level: Oriented to person  Cognition: Follows commands  Safety/Judgement: Fall prevention                          Physical Skills Involved:  1. Range of Motion  2. Balance  3. Strength  4. Activity Tolerance Cognitive Skills Affected (resulting in the inability to perform in a timely and safe manner):  1. Executive Function  2. Expression Psychosocial Skills Affected:  1. Habits/Routines  2. Environmental Adaptation  3. Social Interaction   Number of elements that affect the Plan of Care: 3-5:  MODERATE COMPLEXITY   CLINICAL DECISION MAKIN[address] [place] [address] AM-PAC 6 Clicks   Daily Activity Inpatient Short Form  How much help from another person does the patient currently need. .. Total A Lot A Little None   1. Putting on and taking off regular lower body clothing? [x] 1   [] 2   [] 3   [] 4   2. Bathing (including washing, rinsing, drying)? [x] 1   [] 2   [] 3   [] 4   3. Toileting, which includes using toilet, bedpan or urinal?   [x] 1   [] 2   [] 3   [] 4   4. Putting on and taking off regular upper body clothing? [] 1   [x] 2   [] 3   [] 4   5. Taking care of personal grooming such as brushing teeth? [] 1   [x] 2   [] 3   [] 4   6. Eating meals? [] 1   [x] 2   [] 3   [] 4   © , Trustees of [address] [place] [address], under license to [employer]. All rights reserved      Score:  Initial: 9 Most Recent: X (Date: -- )    Interpretation of Tool:  Represents activities that are increasingly more difficult (i.e. Bed mobility, Transfers, Gait). Medical Necessity:     · Skilled intervention continues to be required due to Deficits noted above. Reason for Services/Other Comments:  · Patient continues to require skilled intervention due to   · Dx above  · .    Use of outcome tool(s) and clinical judgement create a POC that gives a: MODERATE COMPLEXITY TREATMENT:   (In addition to Assessment/Re-Assessment sessions the following treatments were rendered)     Pre-treatment Symptoms/Complaints:    Pain: Initial:   Pain Intensity 1: 1  Pain Location 1: Arm  Pain Orientation 1: Right  Post Session:  1     Self Care: (40): Procedure(s) (per grid) utilized to improve and/or restore self-care/home management as related to dressing, bathing and toileting. Required maximal verbal, manual and tactile cueing to facilitate activities of daily living skills. [employer] UE- AAROM with pain and limited range   L UE Date:  9/25/21 Date:   Date:     Activity/Exercise Parameters Parameters Parameters   Shoulder flex/ex 10 reps      Shoulder hor abd/add 10 reps      Elbow flex/ ex 10 reps      Wrist flex/ex 10 reps      Digit flex/ex 10 reps     Wash face with set up  1                Braces/Orthotics/Lines/Etc:   ·    Treatment/Session Assessment:    · Response to Treatment:  Good, sitting up in recliner  · Interdisciplinary Collaboration:   o Certified Occupational Therapy Assistant  o Registered Nurse  · After treatment position/precautions:   o Up in chair  o Bed/Chair-wheels locked  o Call light within reach  o RN notified  o Family at bedside   · Compliance with Program/Exercises: Compliant all of the time, Will assess as treatment progresses. · Recommendations/Intent for next treatment session: \"Next visit will focus on advancements to more challenging activities and reduction in assistance provided\".   Total Treatment Duration:  OT Patient Time In/Time Out  Time In: 0910  Time Out: [address], OT

## 2021-09-27 NOTE — PROGRESS NOTES
Problem: Falls - Risk of  Goal: *Absence of Falls  Description: Document Melonie Bare Fall Risk and appropriate interventions in the flowsheet. Outcome: Progressing Towards Goal  Note: Fall Risk Interventions:  Mobility Interventions: Assess mobility with egress test    Elimination Interventions: Bed/chair exit alarm, Call light in reach  Problem: Patient Education: Go to Patient Education Activity  Goal: Patient/Family Education  Outcome: Progressing Towards Goal     Pt was able to sleep most of the night. No issues this shift.

## 2021-09-27 NOTE — PROGRESS NOTES
Problem: Mobility Impaired (Adult and Pediatric)  Goal: *Acute Goals and Plan of Care (Insert Text)  Outcome: Progressing Towards Goal  Note: STG:  (1.)Mr. Clarance Sever will move from supine to sit and sit to supine  with MODERATE ASSIST within 4-7 treatment day(s). (2.)Mr. Clarance Sever will transfer from bed to chair and chair to bed with CONTACT GUARD ASSIST using the least restrictive device within 4-7 treatment day(s). (3.)Mr. Jimenez will ambulate with CONTACT GUARD ASSIST for 100 feet with the least restrictive device within 4-7 treatment day(s). ________________________________________________________________________________________________    PHYSICAL THERAPY: Daily Note and AM 9/27/2021  INPATIENT: PT Visit Days : 4  Payor: Rahul Aranda / Plan: [address] [place] [address] HMO / Product Type: Managed Care Medicare /       NAME/AGE/GENDER: Bay Real is a 78 y.o. male   PRIMARY DIAGNOSIS: Inability to walk [R26.2]  Hoarseness [R49.0] Inability to walk Inability to walk       ICD-10: Treatment Diagnosis:    · Generalized Muscle Weakness (M62.81)  · Difficulty in walking, Not elsewhere classified (R26.2)   Precaution/Allergies:  Augmentin [amoxicillin-pot clavulanate]      ASSESSMENT:     Mr. Clarance Sever presents with generalized weakness and decreased independence with functional mobility. He will benefit from skilled PT interventions to maximize independence with functional mobility. He lives at home with his wife and was getting home health therapy at the house. Pt states he fell in august and has been receiving home health since then. On his last hospital admission the family did not want SNF but therapy feels that this time he might be a little further below his baseline and recommend SNF, discussed this with SW.     Patient up in recliner upon contact. Wife present. Wife states it has been a busy morning and a lot of staff have been in the room to work with patient.  Patient states he is tired; doesn't feel well and doesn't think he can walk. Pleasant but needed encouragement to participate. Worked on some LE exercises in sitting; transferred to standing at platform walker with gait belt and mod assist. Leans posteriorly; unable to hold onto walker with R hand. Patient continued to state he felt too weak to walk. [facility] and marched in place then returned to sitting. EMG scheduled for this afternoon. Hopefully will be able to progress gait tomorrow. This section established at most recent assessment   PROBLEM LIST (Impairments causing functional limitations):  1. Decreased Strength  2. Decreased ADL/Functional Activities  3. Decreased Transfer Abilities  4. Decreased Ambulation Ability/Technique  5. Decreased Balance  6. Decreased Activity Tolerance   INTERVENTIONS PLANNED: (Benefits and precautions of physical therapy have been discussed with the patient.)  1. Balance Exercise  2. Bed Mobility  3. Gait Training  4. Therapeutic Activites  5. Therapeutic Exercise/Strengthening  6. Transfer Training     TREATMENT PLAN: Frequency/Duration: daily for duration of hospital stay  Rehabilitation Potential For Stated Goals: Good     REHAB RECOMMENDATIONS (at time of discharge pending progress):    Placement: It is my opinion, based on this patient's performance to date, that Mr. Ochoa Tariq may benefit from intensive therapy at [address] after discharge due to the functional deficits listed above that are likely to improve with skilled rehabilitation and concerns that he/she may be unsafe to be unsupervised at home. Equipment:    To be determined              HISTORY:   History of Present Injury/Illness (Reason for Referral):  Copied from MD note: Patient is a 78 y.o. male who presented to the ED for cc persistent right arm weakness, inability to walk, and difficulty with speaking. Neurology has seen and is concerned of motor neuron disease. Patient has medical history of BPH, CKD stage III.  Most recent discharge from our facility on 8/14/21. ECHO was normal. Neurology was consulted. MRI Cervical showed mild central canal stenosis, but nothing serious. He was orthostatic positive. It was recommended to stop Flomax but the patient and wife wanted to talk to Urology first. EMG has been scheduled at [place] for this Monday at [address]. Transportation will need to be arranged so he arrives no later than 730 am.    Past Medical History/Comorbidities:   Mr. Violet Bee  has a past medical history of Chronic kidney disease, Elevated PSA, Hearing difficulty, Hepatitis A (2020), Hypercholesterolemia, and Kidney stone. Mr. Violet Bee  has a past surgical history that includes hx colonoscopy (05/18/2016). Social History/Living Environment:   Home Environment: Private residence  Living Alone: No  Support Systems: Spouse/Significant Other  Current DME Used/Available at Home: Walker, rolling  Prior Level of Function/Work/Activity:  Pt living at home with spouse, receiving home health therapy     Number of Personal Factors/Comorbidities that affect the Plan of Care: 1-2: MODERATE COMPLEXITY   EXAMINATION:   Most Recent Physical Functioning:   Gross Assessment:                  Posture:     Balance:  Sitting - Static: Good (unsupported)  Sitting - Dynamic: Fair (occasional)  Standing: Pull to stand; With support  Standing - Static: Constant support  Standing - Dynamic : Constant support Bed Mobility:     Wheelchair Mobility:     Transfers:  Sit to Stand: Moderate assistance  Stand to Sit: Minimum assistance; Moderate assistance  Bed to Chair: Moderate assistance  Gait:     Distance (ft):  (marched in place)      Body Structures Involved:  1. Muscles Body Functions Affected:  1. Movement Related Activities and Participation Affected:  1. Mobility  2.  Self Care   Number of elements that affect the Plan of Care: 4+: HIGH COMPLEXITY   CLINICAL PRESENTATION:   Presentation: Evolving clinical presentation with changing clinical characteristics: MODERATE COMPLEXITY   CLINICAL DECISION MAKING:   [facility] AM-PAC 6 Clicks   Basic Mobility Inpatient Short Form  How much difficulty does the patient currently have. .. Unable A Lot A Little None   1. Turning over in bed (including adjusting bedclothes, sheets and blankets)? [] 1   [x] 2   [] 3   [] 4   2. Sitting down on and standing up from a chair with arms ( e.g., wheelchair, bedside commode, etc.)   [] 1   [] 2   [x] 3   [] 4   3. Moving from lying on back to sitting on the side of the bed? [] 1   [x] 2   [] 3   [] 4   How much help from another person does the patient currently need. .. Total A Lot A Little None   4. Moving to and from a bed to a chair (including a wheelchair)? [] 1   [] 2   [x] 3   [] 4   5. Need to walk in hospital room? [] 1   [] 2   [x] 3   [] 4   6. Climbing 3-5 steps with a railing? [] 1   [x] 2   [] 3   [] 4   © 2007, Trustees of [facility], under license to [employer]. All rights reserved      Score:  Initial: 15 Most Recent: X (Date: -- )    Interpretation of Tool:  Represents activities that are increasingly more difficult (i.e. Bed mobility, Transfers, Gait). Medical Necessity:     · Patient is expected to demonstrate progress in   · strength and functional technique  ·  to   · decrease assistance required with functional mobility  · . Reason for Services/Other Comments:  · Patient continues to require skilled intervention due to   · Inability to complete functional mobility independently  · . Use of outcome tool(s) and clinical judgement create a POC that gives a: Clear prediction of patient's progress: LOW COMPLEXITY            TREATMENT:   (In addition to Assessment/Re-Assessment sessions the following treatments were rendered)   Pre-treatment Symptoms/Complaints:  Patient reports more difficulty with R hand today. Pain: Initial:   Pain Intensity 1: 0  Post Session:  0/10       Therapeutic Activity: (    20 minutes):   Therapeutic activities including sit to stand transfers, marching in place, and LE exercises to improve mobility, strength, balance and coordination. Required minimal to moderate assist for   to promote static and dynamic balance in standing. Date:  9/27/21 Date:   Date:     Activity/Exercise Parameters Parameters Parameters   Ankle pumps 12     LAQ 12     Seated marching 12     Standing marching at walker 12                         Braces/Orthotics/Lines/Etc:   · none  Treatment/Session Assessment:    · Response to Treatment:   Weak; needs encouragement. · Interdisciplinary Collaboration:   o Physical Therapist  o Registered Nurse  · After treatment position/precautions:   o Up in chair  o Bed/Chair-wheels locked  o Call light within reach  o Family at bedside   · Compliance with Program/Exercises: Compliant most of the time, Will assess as treatment progresses  · Recommendations/Intent for next treatment session: \"Next visit will focus on advancements to more challenging activities and reduction in assistance provided\".   Total Treatment Duration:  PT Patient Time In/Time Out  Time In: 1030  Time Out: [address],

## 2021-09-28 PROBLEM — T17.908A ASPIRATION INTO AIRWAY: Status: ACTIVE | Noted: 2021-01-01

## 2021-09-28 NOTE — PROGRESS NOTES
Care Management Interventions  PCP Verified by CM: Yes  Mode of Transport at Discharge: BLS  Transition of Care Consult (CM Consult): SNF (current with Interim)  Partner SNF: Yes  Physical Therapy Consult: Yes  Occupational Therapy Consult: Yes  Support Systems: Spouse/Significant Other  Confirm Follow Up Transport: Other (see comment) (stretcher)  The Plan for Transition of Care is Related to the Following Treatment Goals : improve mobility  The Patient and/or Patient Representative was Provided with a Choice of Provider and Agrees with the Discharge Plan?: Yes  Freedom of Choice List was Provided with Basic Dialogue that Supports the Patient's Individualized Plan of Care/Goals, Treatment Preferences and Shares the Quality Data Associated with the Providers?: Yes  Discharge Location  Discharge Placement: Skilled nursing facility    Everything ready for transfer to [facility] when medically ready. Dago Flroez at [place] has received [facility] and they can accept a rapid COVID. PPD placed over weekend. Pt /wife updated yesterday evening. Wife happy to hear that [facility] may start allowing visitation today.

## 2021-09-28 NOTE — PROGRESS NOTES
Pt discharge summary callled to [facility] and spoke with Eliel Marques. .  Assessment awaiting discharge from hospital to be transported via EMS transport.

## 2021-09-28 NOTE — PROGRESS NOTES
LTG: Patient will tolerate least restrictive diet without overt signs or symptoms of airway compromise. Advanced 9/27 per patient/family request. Discontinued 9/28  STG: Patient will demonstrate use of safe swallowing precautions to reduce aspiration risk with 90% accuracy given min-mod cues. STG: Patient will participate in modified barium swallow study as clinically indicated. SPEECH LANGUAGE PATHOLOGY: MODIFIED BARIUM SWALLOW STUDY  Initial Assessment    NAME/AGE/GENDER: Jake Rodriges is a 78 y.o. male  DATE: 9/28/2021  PRIMARY DIAGNOSIS: Inability to walk [R26.2]  Hoarseness [R49.0]      ICD-10: Treatment Diagnosis: Oropharyngeal dysphagia (R13.12)  INTERDISCIPLINARY COLLABORATION: Radiologist, Dr. Emani Jimenez  PRECAUTIONS/ALLERGIES: Augmentin [amoxicillin-pot clavulanate]     RECOMMENDATIONS/PLAN   Safest oral diet with known aspiration risk:     Soft and Bite-Sized   Thin Liquids by teaspoon or single cup sips    MEDICATIONS: crushed in applesauce- if appropriate     COMPENSATORY STRATEGIES/MODIFICATIONS INCLUDING:  · Fully awake/alert  · Upright for all PO  · 1:1 assistance with all PO  · Small bites and sips  · Liquids by spoon  · Cup/sip  · No straws  · Clear throat periodically and dry swallow  · Remain upright for 20-30 min after any PO  · Meticulous oral care     OTHER RECOMMENDATIONS (including follow up treatment recommendations):   · Training in use of compensatory safe swallowing strategies/feeding guidelines  · Patient/family education     RECOMMENDATIONS for CONTINUED SPEECH THERAPY:   YES: Anticipate need for ongoing speech therapy during this hospitalization and at next level of care. FREQUENCY/DURATION: Continue to follow patient 3 times a week for duration of hospital stay to address above goals.  Recommendations for next treatment session: Next treatment session will address diet tolerance and patient/caregiver education       ASSESSMENT   Mr. Dustin Vazquez presents with moderate-severe oropharyngeal dysphagia. Signficant decline in swallow function since prior MBSS completed 8/12/21. Poor oral control, bolus cohesion, and A-P bolus propulsion with tongue pumping with all trials. Decreased epiglottic inversion, pharyngeal constriction, hyolaryngeal elevation/excursion, and incomplete laryngeal closure resulted in the following: Aspiration of thin by cup and SILENT aspiration of mildly thick/nectar by cup. Non-clearing penetration occurred with mildly thick by teaspoon and honey by teaspoon/cup. Laryngeal penetration following honey by teaspoon/cup eventually fell to the cords and was then silently aspirated. Cued cough effective at times in clearing tracheal aspiration. No aspiration/penetration occurred with pudding or mixed consistency fruit. Educated patient and patient's wife at bedside on results of assessment, risk for aspiration with po intake, and risks/complications associated with aspiration. Patient adament that he does not want alternate means of nutrition/hydration. Both patient and his wife express that they accept aspiration risks and wish for patient to resume oral diet for comfort. Recommended diet with known risk for aspiration: soft/bite sized diet and thin liquids by teaspoon/single cups sips. Meds crushed in applesauce if appropriate. 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 In this case, exercises to improve oropharyngeal swallow function are not clinically indicated. Will continue to follow patient for ongoing education regarding safe swallowing precautions and compensatory strategies to reduce aspiration risk. SUBJECTIVE   Patient alert upright in MBSS chair. Speech remains dysarthric and voice is hoarse with wet quality. Following 1 step commands and answering questions appropriately.      History of Present Injury/Illness: Mr. Troy Romero  has a past medical history of Chronic kidney disease, Elevated PSA, Hearing difficulty, Hepatitis A (2020), Hypercholesterolemia, and Kidney stone. Rick Yanez He also  has a past surgical history that includes hx colonoscopy (05/18/2016). Pain:  Pain Intensity 1: 0  Pain Location 1: Hand  Pain Orientation 1: Right    Current dietary status prior to evaluation today:  Easy to chew (per patient request) and mildly thick liquids by teaspoon    Previous Modified Barium Swallow studies: Prior modified barium swallow study completed 8/12/21 with results as follows:  \"Mr. Adeline Fraga presents with mild-moderate oropharyngeal dysphagia. Oral dysphagia with reduced tongue control impacting oral containment and lingual motion/transport slowed and repetitive. Premature spillage into pharynx to laryngeal vestibule and pyriforms with non clearing penetration and SILENT aspiration of thin liquids during the swallow with thin liquid via cup and delayed swallow initiation at pyriforms resulting in non clearing shallow penetration with mildly thick liquids via straw. There was no new penetration or aspiration with mildly thick liquids via cup, moderately thick liquids via cup and straw, pudding, and chewables.      Recommend easy to chew diet and MILDLY THICK LIQUIDS via cup only. No straws. \"         Current Medications:   No current facility-administered medications on file prior to encounter. Current Outpatient Medications on File Prior to Encounter   Medication Sig Dispense Refill    finasteride (PROSCAR) 5 mg tablet Take 1 Tablet by mouth daily. TAKE ONE TABLET BY MOUTH EVERY DAY 90 Tablet 1    tamsulosin (FLOMAX) 0.4 mg capsule Take 1 Capsule by mouth daily. 90 Capsule 1    predniSONE (DELTASONE) 20 mg tablet Take 20 mg by mouth two (2) times a day for 5 days. 10 Tablet 0    aspirin 81 mg chewable tablet Take 1 Tablet by mouth daily.  30 Tablet 2       OBJECTIVE   Orientation:    Person   Place    Oral Assessment:  Labial: Decreased seal  Lingual: Decreased rate, Decreased strength and Impaired coordination  Dentition: Natural and Limited  Oral Hygiene: Adequate  Vocal Quality: Hoarse, Harsh and Wet vocal quality    Modified barium swallow study was performed in the radiology suite using c-arm with Mr. Jimenez in the lateral plane seated upright 90° in the MBS chair. To evaluate his swallow function, barium coated liquid and food was administered in the form of thin liquids (by spoon and cup sip), mildly-thick liquids (by spoon and cup sip), moderately-thick liquids (by spoon and cup sip), pudding, mixed consistency and cracker. Oral phase of swallow:    anterior loss   anterior bolus holding   prolonged mastication   reduced bolus control   reduced posterior propulsion skills   lingual pumping   reduced oral containment   premature spillage to pharynx pre-swallow   bolus residual on tongue    Pharyngeal phase of swallow:   Study started with mildly thick by teaspoon due to history of dysphagia with silent aspiration of thin liquids.  Swallows of nectar by teaspoon were delayed at posterior epiglottis and pyriforms. Trace nonclearing penetration occurred during the swallow and falls deeper after the swallow. Min diffuse pharyngeal residue post swallow. Swallows of nectar by cup were triggered at the vallecula. Penetration occurred during the swallow and SILENT aspiration occurred after the swallow. Min-mild diffuse pharyngeal residue post swallow. Mild oral residue post swallow, which resulted in additional nonclearing penetration on subsequent swallow (to clear oral res). Cued cough appeared to clear tracheal aspiration.  Swallows of honey by teaspoon were timely with no aspiration/penetration across 2 trials. Moderate oral and vallecular residue post swallow, which was then penetrated during subsequent swallow and does not clear. When presented with honey by cup (swallow timely), patient then silently aspirated previously penetrated material and has additional nonclearing penetration during the swallow with honey by cup. Penetrated material then silently aspirated after the swallow. Cued cough appears to effectively clear.  Swallow of thin by teaspoon and single cups sips triggered at pyriforms with trace nonclearing penetration occurring during the swallow. With thin by cup, penetrated material fell to cords after swallow without cough response. Min-mild vallecular residue post swallow following thin. When given thin by single cup sip as liquid rinse following solid consistency cracker, liquid spills to pyriforms and is penetrated to cords pre-swallow and aspirated during the swallow with immediate cough response. Cough did not completely clear aspiration.  Swallows of pudding were timely with no aspiration/penetration. Min vallecular residue post swallow.  Swallows of mixed fruit were triggered at the vallecula with no aspiration/penetration. Mild oral residue post swallow.  Swallows of cracker were timely. Trace nonclearing penetration occurred during the swallow. Mild oral and vallecular residue post swallow. Pharyngeal characteristics:   delayed pharyngeal swallow initiation   decreased retraction of base of tongue   delayed and decreased hyolaryngeal elevation/excursion   delayed and decreased epiglottic inversion   decreased constriction of posterior pharyngeal wall   delayed and decreased laryngeal closure  Attempted strategies:    chin tuck  Effective strategies:    none  Aspiration/Penetration Scale: 8 (Silent aspiration. Contrast passes below the folds/glottis with no effort to eject.)    Cervical esophageal phase of swallow:   signs of reduced bolus clearance through cervical esophagus  - minimal amount of residuals noted in cervical esophagus. Appears to eventually clear. **Distal esophagus not assessed due to limitations of MBS study. Assessment/Reassessment only, no treatment provided today.       Tool Used: Dysphagia Outcome and Severity Scale (ANITA)    Comments   Normal Diet With no strategies or extra time needed   Functional Swallow May have mild oral or pharyngeal delay   Mild Dysphagia Which may require one diet consistency restricted    Mild-Moderate Dysphagia With 1-2 diet consistencies restricted   Moderate Dysphagia With 2 or more diet consistencies restricted   Moderately Severe Dysphagia With partial PO strategies (trials with ST only)   Severe Dysphagia With inability to tolerate any PO safely      Score:  Initial: 2 Most Recent: x (Date:9/28/2021)   Interpretation of Tool: The Dysphagia Outcome and Severity Scale (ANITA) is a simple, easy-to-use, 7-point scale developed to systematically rate the functional severity of dysphagia based on objective assessment and make recommendations for diet level, independence level, and type of nutrition. Payor: Sanaz Pascal / Plan: [address] [place] [address] HMO / Product Type: Managed Care Medicare /     Education:  · Recommendations discussed with patient, patient's wife, and hospitalist.    Safety:   After treatment position/precautions:  · Patient waiting in radiology holding bay for transport back to room.     Total Treatment Duration:  Time In: 1315   Time Out: [address] [facility] [address], [address] [facility]

## 2021-09-28 NOTE — PROGRESS NOTES
Problem: Mobility Impaired (Adult and Pediatric)  Goal: *Acute Goals and Plan of Care (Insert Text)  Outcome: Progressing Towards Goal  Note: STG:  (1.)Mr. Lidya Parra will move from supine to sit and sit to supine  with MODERATE ASSIST within 4-7 treatment day(s). (2.)Mr. Lidya Parra will transfer from bed to chair and chair to bed with CONTACT GUARD ASSIST using the least restrictive device within 4-7 treatment day(s). (3.)Mr. Jimenez will ambulate with CONTACT GUARD ASSIST for 100 feet with the least restrictive device within 4-7 treatment day(s). ________________________________________________________________________________________________    PHYSICAL THERAPY: Daily Note and PM 9/28/2021  INPATIENT: PT Visit Days : 5  Payor: Crystal Vila / Plan: [address] [place] [address] HMO / Product Type: Managed Care Medicare /       NAME/AGE/GENDER: Lady Valero is a 78 y.o. male   PRIMARY DIAGNOSIS: Inability to walk [R26.2]  Hoarseness [R49.0] Inability to walk Inability to walk       ICD-10: Treatment Diagnosis:    · Generalized Muscle Weakness (M62.81)  · Difficulty in walking, Not elsewhere classified (R26.2)   Precaution/Allergies:  Augmentin [amoxicillin-pot clavulanate]      ASSESSMENT:     Mr. Lidya Parra presents with generalized weakness and decreased independence with functional mobility. He will benefit from skilled PT interventions to maximize independence with functional mobility. He lives at home with his wife and was getting home health therapy at the house. Pt states he fell in august and has been receiving home health since then. On his last hospital admission the family did not want SNF but therapy feels that this time he might be a little further below his baseline and recommend SNF, discussed this with SW.     Patient in supine, wife present. Pt. Kimberly Calderon to get up and wants to walk. Mod assist for mobility. He sat on side of bed with some posterior lean. All mobility and movement takes extra time.   He stood mod assist and took some steps to chair with RW. He leaned posteriorly the entire time was not able to get his balance and stability on his feet. He got fatigued and wanted to sit down in the chair. Wife present. Plans are for snf. This section established at most recent assessment   PROBLEM LIST (Impairments causing functional limitations):  1. Decreased Strength  2. Decreased ADL/Functional Activities  3. Decreased Transfer Abilities  4. Decreased Ambulation Ability/Technique  5. Decreased Balance  6. Decreased Activity Tolerance   INTERVENTIONS PLANNED: (Benefits and precautions of physical therapy have been discussed with the patient.)  1. Balance Exercise  2. Bed Mobility  3. Gait Training  4. Therapeutic Activites  5. Therapeutic Exercise/Strengthening  6. Transfer Training     TREATMENT PLAN: Frequency/Duration: daily for duration of hospital stay  Rehabilitation Potential For Stated Goals: Good     REHAB RECOMMENDATIONS (at time of discharge pending progress):    Placement: It is my opinion, based on this patient's performance to date, that Mr. June Regalado may benefit from intensive therapy at a [address] [place] [address] after discharge due to the functional deficits listed above that are likely to improve with skilled rehabilitation and concerns that he/she may be unsafe to be unsupervised at home. Equipment:    To be determined              HISTORY:   History of Present Injury/Illness (Reason for Referral):  Copied from MD note: Patient is a 78 y.o. male who presented to the ED for cc persistent right arm weakness, inability to walk, and difficulty with speaking. Neurology has seen and is concerned of motor neuron disease. Patient has medical history of BPH, CKD stage III. Most recent discharge from our facility on 8/14/21. ECHO was normal. Neurology was consulted. MRI Cervical showed mild central canal stenosis, but nothing serious. He was orthostatic positive.  It was recommended to stop Flomax but the patient and wife wanted to talk to Urology first. EMG has been scheduled at [address] for this Monday at [address]. Transportation will need to be arranged so he arrives no later than 730 am.    Past Medical History/Comorbidities:   Mr. Mickael Schwab  has a past medical history of Chronic kidney disease, Elevated PSA, Hearing difficulty, Hepatitis A (2020), Hypercholesterolemia, and Kidney stone. Mr. Mickael Schwab  has a past surgical history that includes hx colonoscopy (05/18/2016). Social History/Living Environment:   Home Environment: Private residence  Living Alone: No  Support Systems: Spouse/Significant Other  Current DME Used/Available at Home: Walker, rolling  Prior Level of Function/Work/Activity:  Pt living at home with spouse, receiving home health therapy     Number of Personal Factors/Comorbidities that affect the Plan of Care: 1-2: MODERATE COMPLEXITY   EXAMINATION:   Most Recent Physical Functioning:   Gross Assessment:                  Posture:     Balance:  Sitting - Static: Fair (occasional)  Sitting - Dynamic: Fair (occasional)  Standing: Impaired;Pull to stand; With support  Standing - Static: Constant support  Standing - Dynamic : Constant support Bed Mobility:  Supine to Sit: Moderate assistance  Scooting: Maximum assistance  Wheelchair Mobility:     Transfers:  Sit to Stand: Moderate assistance  Stand to Sit: Moderate assistance  Bed to Chair: Moderate assistance  Duration: 25 Minutes  Gait:     Speed/Amanda: Delayed; Shuffled  Step Length: Right shortened;Left shortened  Gait Abnormalities: Decreased step clearance;Shuffling gait  Distance (ft): 4 Feet (ft)  Assistive Device: Walker, rolling; Other (comment) (platform right)  Ambulation - Level of Assistance: Moderate assistance  Interventions: Manual cues; Safety awareness training; Tactile cues; Verbal cues      Body Structures Involved:  1. Muscles Body Functions Affected:  1. Movement Related Activities and Participation Affected:  1. Mobility  2.  Self Care Number of elements that affect the Plan of Care: 4+: HIGH COMPLEXITY   CLINICAL PRESENTATION:   Presentation: Evolving clinical presentation with changing clinical characteristics: MODERATE COMPLEXITY   CLINICAL DECISION MAKIN [facility] Mobility Inpatient Short Form  How much difficulty does the patient currently have. .. Unable A Lot A Little None   1. Turning over in bed (including adjusting bedclothes, sheets and blankets)? [] 1   [x] 2   [] 3   [] 4   2. Sitting down on and standing up from a chair with arms ( e.g., wheelchair, bedside commode, etc.)   [] 1   [] 2   [x] 3   [] 4   3. Moving from lying on back to sitting on the side of the bed? [] 1   [x] 2   [] 3   [] 4   How much help from another person does the patient currently need. .. Total A Lot A Little None   4. Moving to and from a bed to a chair (including a wheelchair)? [] 1   [] 2   [x] 3   [] 4   5. Need to walk in hospital room? [] 1   [] 2   [x] 3   [] 4   6. Climbing 3-5 steps with a railing? [] 1   [x] 2   [] 3   [] 4   © , Trustees of [facility] [employer], under license to [employer]. All rights reserved      Score:  Initial: 15 Most Recent: X (Date: -- )    Interpretation of Tool:  Represents activities that are increasingly more difficult (i.e. Bed mobility, Transfers, Gait). Medical Necessity:     · Patient is expected to demonstrate progress in   · strength and functional technique  ·  to   · decrease assistance required with functional mobility  · . Reason for Services/Other Comments:  · Patient continues to require skilled intervention due to   · Inability to complete functional mobility independently  · .    Use of outcome tool(s) and clinical judgement create a POC that gives a: Clear prediction of patient's progress: LOW COMPLEXITY            TREATMENT:   (In addition to Assessment/Re-Assessment sessions the following treatments were rendered)   Pre-treatment Symptoms/Complaints:  Patient with no complaints  Pain: Initial:      Post Session:  0/10       Therapeutic Activity: (  25 Minutes): Therapeutic activities including sit to stand transfers, steps to chair, standing balance and sitting balance to improve mobility, strength, balance and coordination. Required minimal to moderate assist for Manual cues; Safety awareness training; Tactile cues; Verbal cues to promote static and dynamic balance in standing. Date:  9/27/21 Date:   Date:     Activity/Exercise Parameters Parameters Parameters   Ankle pumps 12     LAQ 12     Seated marching 12     Standing marching at walker 12                         Braces/Orthotics/Lines/Etc:   · none  Treatment/Session Assessment:    · Response to Treatment:   Debility, weakness  · Interdisciplinary Collaboration:   o Physical Therapist  o Registered Nurse  · After treatment position/precautions:   o Up in chair  o Bed/Chair-wheels locked  o Bed in low position  o Caregiver at bedside  o Call light within reach  o Family at bedside   · Compliance with Program/Exercises: Compliant most of the time, Will assess as treatment progresses  · Recommendations/Intent for next treatment session: \"Next visit will focus on advancements to more challenging activities and reduction in assistance provided\".   Total Treatment Duration:  PT Patient Time In/Time Out  Time In: 1435  Time Out: [address], PT

## 2021-09-28 NOTE — PROGRESS NOTES
EMG and nerve conduction study have been reviewed    I would concur with the interpretation that there appears to be a high likelihood of amyotrophic lateral sclerosis. The evidence is strong enough to suggest that Rilutek should be begun    There has been considerable \"buzz\" with regards to new medications such as [id number].     The current situation is sufficiently fluid with reference to investigational agents that I am personally reluctant to manage ALS patients in the absence of ongoing support from a major national ALS center    I would personally recommend Dr. Becki Rodriguez in [place] who has been a nationally recognized ALS specialist for many many years

## 2021-09-28 NOTE — PROGRESS NOTES
Care Management Interventions  PCP Verified by CM: Yes  Mode of Transport at Discharge: BLS  Transition of Care Consult (CM Consult): SNF (current with Interim)  Partner SNF: Yes  Physical Therapy Consult: Yes  Occupational Therapy Consult: Yes  Support Systems: Spouse/Significant Other  Confirm Follow Up Transport: Other (see comment) (stretcher)  The Plan for Transition of Care is Related to the Following Treatment Goals : improve mobility  The Patient and/or Patient Representative was Provided with a Choice of Provider and Agrees with the Discharge Plan?: Yes  Freedom of Choice List was Provided with Basic Dialogue that Supports the Patient's Individualized Plan of Care/Goals, Treatment Preferences and Shares the Quality Data Associated with the Providers?: Yes  Discharge Location  Discharge Placement: Skilled nursing facility    Patient discharged today to [address] [place] [address] (formerly [facility]. Wife was present and asked to ride with him in [employer] ambulance cab. Orders faxed to [facility] prior to d/c and room and report given. RN given number to call report.

## 2021-09-28 NOTE — DISCHARGE SUMMARY
Hospitalist Discharge Summary   Admit Date:  2021  7:29 PM   DC Note date: 2021  Name:  Duncan Beach   Age:  78 y.o. Sex:  male  :  1940   MRN:  [id number]   Room:  [facility]  PCP:  Juvenal Riddle MD    Presenting Complaint: No chief complaint on file. Initial Admission Diagnosis: Inability to walk [R26.2]  Hoarseness [R49.0]     Problem List for this Hospitalization:  Hospital Problems as of 2021 Date Reviewed: 2021        Codes Class Noted - Resolved POA    Aspiration into airway ICD-10-CM: T17.908A  ICD-9-CM: 934.9  2021 - Present Yes        Suspected pulmonary aspiration of food ICD-10-CM: T17.820A  ICD-9-CM: 934.8  2021 - Present Yes        Hoarseness ICD-10-CM: R49.0  ICD-9-CM: 784.42  2021 - Present Yes        * (Principal) Inability to walk ICD-10-CM: R26.2  ICD-9-CM: 719.7  2021 - Present Yes            Did Patient have Sepsis (YES OR NO): No    Hospital Course:  Mr. Sylvie Black is a nice 77 y/o WM who presented to the ER at [facility] on  with ongoing R sided weakness, hoarse voice and trouble ambulating. He was hospitalized last month and had an unremarkable CVA work up. He was discharged home. Went to a [place] ER recently and was ultimately discharged from there. He saw his PCP on  due to ongoing weakness, drooling and difficulty with speech. His PCP called and spoke to Dr. Jeovany Jameson who advised that he go to the ER for further evaluation. He was seen by Neurology with concerns for motor neuron disease. C-spine MRI showed mild central canal narrowing w/o cord signal changes. Since no beds were available at [facility] he was transferred to [facility] for further management. Thoracolumbar MRIs notable only for degenerative changes with some foraminal narrowing at L3-4. Therapies were consulted and recommending rehab placement. 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 No myopathic process. \" Concern for ALS and needs outpatient referral to an ALS specialist. Needs outpatient Neurology follow up to arrange this. He underwent MBS due to concern for aspiration. This was confirmed on MBS that showed aspiration of thin, nectar and honey thickened liquids. I and speech therapy have spoke to patient and his wife and patient does not wish to pursue a PEG tube. They are both agreeable to moving forward with the least restrictive diet and allow him to eat with known aspiration risk. His hospital course was otherwise unremarkable and he is medically stable for discharge to rehab today. Diet recommendations per speech therapy:  Soft/bite sized food; thin liquids by teaspoon or single cup sip. Disposition: Rehab Facility  Diet: ADULT DIET Easy to Chew; Mildly Thick (Nectar); Liquids by Spoon only  Code Status: DNR    Follow Up Orders: Follow-up Appointments   Procedures    FOLLOW UP VISIT Appointment in: One Month Follow up with Dr. Huyen Shirley  in [facility] neurology clinic [facility], [address]     Follow up with Dr. Huyen Shirley  in [facility] neurology clinic  [facility], [address]     Standing Status:   Standing     Number of Occurrences:   1     Order Specific Question:   Appointment in     Answer:   One Month       Follow-up Information     Follow up With Specialties Details [address] [facility] [place], [place] [address]    Aly Moura MD Neurology, Neurology Schedule an appointment as soon as possible for a visit Hospital follow up. Motor neuron disease. [facility]  [address] [place] [address]  [facility]  Dinesh Hernandez MD Family Medicine   [address] [place] [address] [address]  [address] [place] [address]  [phone]          Time spent in patient discharge and coordination 35 minutes.     Plan was discussed with patient and wife, RN, CM. All questions answered. Patient was stable at time of discharge. Given instructions to call a physician or return if any concerns. Discharge Info:   Current Discharge Medication List      START taking these medications    Details   cetirizine (ZYRTEC) 10 mg tablet Take 1 Tablet by mouth daily. Qty: 1 Tablet, Refills: 0  Start date: 9/29/2021         CONTINUE these medications which have NOT CHANGED    Details   finasteride (PROSCAR) 5 mg tablet Take 1 Tablet by mouth daily. TAKE ONE TABLET BY MOUTH EVERY DAY  Qty: 90 Tablet, Refills: 1  Start date: 9/23/2021    Associated Diagnoses: Nocturia associated with benign prostatic hyperplasia      tamsulosin (FLOMAX) 0.4 mg capsule Take 1 Capsule by mouth daily. Qty: 90 Capsule, Refills: 1  Start date: 9/23/2021    Associated Diagnoses: Stage 3 chronic kidney disease, unspecified whether stage 3a or 3b CKD (HCC)      predniSONE (DELTASONE) 20 mg tablet Take 20 mg by mouth two (2) times a day for 5 days. Qty: 10 Tablet, Refills: 0  Start date: 9/23/2021, End date: 9/28/2021    Associated Diagnoses: Spastic hemiplegia affecting right dominant side, unspecified etiology ([facility] [address]      aspirin 81 mg chewable tablet Take 1 Tablet by mouth daily. Qty: 30 Tablet, Refills: 2             Procedures done this admission:  * No surgery found *    Consults this admission:  None    Echocardiogram/EKG results:  Results from Hospital Encounter encounter on 08/11/21    ECHO ADULT COMPLETE    Interpretation Summary  · LV: Estimated LVEF is 55 - 60%. Normal cavity size, wall thickness and systolic function (ejection fraction normal). Age-appropriate left ventricular diastolic function. · Right Ventricle: Normal cavity size and global systolic function. · AV: Moderate aortic valve sclerosis. Aortic valve leaflet calcification present. Mild aortic valve regurgitation is present. · PV: Mild pulmonic valve regurgitation is present.   · TV: Mild tricuspid valve regurgitation is present. · MV: Mild mitral valve regurgitation is present. EKG Results     None          Diagnostic Imaging/Tests:   XR HAND RT MIN 3 V    Result Date: 9/26/2021  EXAM: 3 views of the right hand. INDICATION: Fall with right hand pain. COMPARISON: None. FINDINGS: Ulnar negative variance. Chronic appearing round ossicle adjacent to the ulnar styloid. No evidence of acute fracture or dislocation. Diffuse osteopenia. No radiopaque foreign body. 1. No evidence of acute fracture or dislocation. If there is persistent pain in the region of the anatomic snuffbox consider repeat wrist radiographs in 7-10 days to exclude an occult scaphoid fracture. XR SWALLOW FUNC VIDEO    Result Date: 9/28/2021  MODIFIED BARIUM SWALLOW CLINICAL HISTORY: Dysphagia with feeding difficulty in an 78year-old with rapid neurological decline possibly associated with PLS. TECHNIQUE:  The examination was performed in conjunction with the speech pathologist.  Barium of various consistencies was administered under lateral fluoroscopic control. Fluoroscopy time was 7 minutes with 14 spot images. COMPARISON:  August 12, 2021. FINDINGS:  There was poor oral bolus formation and inconsistent anteroposterior transport but prompt initiation of the swallowing reflex. Small vallecular residuals were noted with nectar and honey consistencies. Laryngeal penetration was seen with thin and nectar by spoon, deep laryngeal penetration from vallecular residuals and with thin barium by cup, and tracheal aspiration with nectar by cup. No laryngeal penetration or tracheal aspiration was observed with other consistencies of barium during the examination. ABNORMAL ORAL AND PHARYNGEAL PHASES OF SWALLOWING WITH INTERVAL WORSENING FROM AUGUST 12, AS DETAILED ABOVE. Preliminary results were reviewed with the speech pathologist at the time of the examination.      MRI [facility] SPINE WO CONT    Result Date: 9/24/2021  Exam: MRI thoracic and lumbar spine without contrast. Indication: Difficulty with ambulation, concern for ALS. Comparison: None. Technique: Multiplanar multisequence imaging of the thoracic and lumbar spine without contrast. FINDINGS: Thoracic spine: Slightly exaggerated thoracic kyphosis without spondylolisthesis. Chronic minimal superior endplate height loss of the T2 and T3 vertebral bodies. Bone marrow signal intensity is within normal limits. No suspicious osseous lesion. No evidence of acute thoracic spine fracture. Thoracic spinal cord appears normal in size and signal intensity. Prevertebral soft tissues are unremarkable. No disc bulge of the thoracic spine. No significant spinal canal or neural foraminal stenosis. Lumbar spine: Lumbar spinal alignment is normal. Vertebral body heights are preserved. Mild degenerative disc changes at L3-L4 with rightward and posterior endplate degenerative changes with associated edema. No suspicious osseous lesion. No evidence of acute lumbosacral spine fracture. Anterior and posterior longitudinal ligament and ligamentum flavum are intact. Lumbar spinal cord is normal in size and signal intensity with the conus medullaris terminating appropriately at L1. Prevertebral soft tissues are unremarkable. T12-L1 through L2-L3: No disc bulge. No spinal canal stenosis. No neural foraminal stenosis. L3-L4: Minimal diffuse disc bulge with minimal spinal canal stenosis. Mild bilateral facet arthropathy with ligamentum flavum thickening. Mild to moderate right and mild left neural foraminal stenosis. L4-L5: Minimal right paracentral and foraminal disc bulge. No significant spinal canal stenosis. Mild facet arthropathy. Mild right neural foraminal stenosis. L5-S1: No disc bulge or spinal canal stenosis. No neural foraminal stenosis.      1. Thoracic and lumbar spinal cord appear normal in size and signal intensity on noncontrast exam. 2. Degenerative changes as above with mild to moderate right neural foraminal stenosis at L3-L4.     MRI LUMB SPINE WO CONT    Result Date: 9/24/2021  Exam: MRI thoracic and lumbar spine without contrast. Indication: Difficulty with ambulation, concern for ALS. Comparison: None. Technique: Multiplanar multisequence imaging of the thoracic and lumbar spine without contrast. FINDINGS: Thoracic spine: Slightly exaggerated thoracic kyphosis without spondylolisthesis. Chronic minimal superior endplate height loss of the T2 and T3 vertebral bodies. Bone marrow signal intensity is within normal limits. No suspicious osseous lesion. No evidence of acute thoracic spine fracture. Thoracic spinal cord appears normal in size and signal intensity. Prevertebral soft tissues are unremarkable. No disc bulge of the thoracic spine. No significant spinal canal or neural foraminal stenosis. Lumbar spine: Lumbar spinal alignment is normal. Vertebral body heights are preserved. Mild degenerative disc changes at L3-L4 with rightward and posterior endplate degenerative changes with associated edema. No suspicious osseous lesion. No evidence of acute lumbosacral spine fracture. Anterior and posterior longitudinal ligament and ligamentum flavum are intact. Lumbar spinal cord is normal in size and signal intensity with the conus medullaris terminating appropriately at L1. Prevertebral soft tissues are unremarkable. T12-L1 through L2-L3: No disc bulge. No spinal canal stenosis. No neural foraminal stenosis. L3-L4: Minimal diffuse disc bulge with minimal spinal canal stenosis. Mild bilateral facet arthropathy with ligamentum flavum thickening. Mild to moderate right and mild left neural foraminal stenosis. L4-L5: Minimal right paracentral and foraminal disc bulge. No significant spinal canal stenosis. Mild facet arthropathy. Mild right neural foraminal stenosis. L5-S1: No disc bulge or spinal canal stenosis. No neural foraminal stenosis.      1. Thoracic and lumbar spinal cord appear normal in size and signal intensity on noncontrast exam. 2. Degenerative changes as above with mild to moderate right neural foraminal stenosis at L3-L4. All Micro Results     Procedure Component Value Units Date/Time    SARS-COV-2, PCR [[id number]] Collected: 09/28/21 0944    Order Status: Completed Updated: 09/28/21 1008    COVID-19 RAPID TEST [[id number]] Collected: 09/28/21 0944    Order Status: Completed Specimen: Nasopharyngeal Updated: 09/28/21 1006     Specimen source NASAL SWAB        COVID-19 rapid test Not detected        Comment:      The specimen is NEGATIVE for SARS-CoV-2, the novel coronavirus associated with COVID-19. A negative result does not rule out COVID-19. This test has been authorized by the FDA under an Emergency Use Authorization (EUA) for use by authorized laboratories. Fact sheet for Healthcare Providers: ConventionUpdate.co.nz  Fact sheet for Patients: The News Funneldate.co.nz       Methodology: Isothermal Nucleic Acid Amplification               Labs: Results:       BMP, Mg, Phos No results for input(s): NA, K, CL, CO2, AGAP, BUN, CREA, CA, GLU, MG, PHOS in the last 72 hours. CBC No results for input(s): WBC, RBC, HGB, HCT, PLT, GRANS, LYMPH, EOS, MONOS, BASOS, IG, ANEU, ABL, DRE, ABM, ABB, AIG, HGBEXT, HCTEXT, PLTEXT, HGBEXT, HCTEXT, PLTEXT in the last 72 hours. LFT No results for input(s): ALT, TBIL, AP, TP, ALB, GLOB, AGRAT in the last 72 hours.     No lab exists for component: SGOT, GPT   Cardiac Testing No results found for: BNPP, BNP, CPK, RCK1, RCK2, RCK3, RCK4, CKMB, CKNDX, CKND1, TROPT, TROIQ   Coagulation Tests Lab Results   Component Value Date/Time    Prothrombin time 13.5 08/11/2021 02:39 PM    Prothrombin time 11.0 09/08/2020 10:18 AM    Prothrombin time 13.0 (H) 05/13/2020 01:48 PM    INR 1.0 08/11/2021 02:39 PM    INR 1.0 09/08/2020 10:18 AM    INR 1.2 05/13/2020 01:48 PM      A1c Lab Results   Component Value Date/Time    Hemoglobin A1c 5.6 08/12/2021 05:31 AM Lipid Panel Lab Results   Component Value Date/Time    Cholesterol, total 131 08/12/2021 05:31 AM    HDL Cholesterol 32 (L) 08/12/2021 05:31 AM    LDL, calculated 81.4 08/12/2021 05:31 AM    VLDL, calculated 17.6 08/12/2021 05:31 AM    Triglyceride 88 08/12/2021 05:31 AM    CHOL/HDL Ratio 4.1 08/12/2021 05:31 AM      Thyroid Panel Lab Results   Component Value Date/Time    TSH 1.800 09/23/2021 02:19 PM    TSH 3.210 03/11/2021 10:18 AM        Most Recent UA Lab Results   Component Value Date/Time    Color YELLOW 08/11/2021 05:52 PM    Appearance CLEAR 08/11/2021 05:52 PM    pH (UA) 5.5 08/11/2021 05:52 PM    Protein Negative 08/11/2021 05:52 PM    Glucose Negative 08/11/2021 05:52 PM    Ketone Negative 08/11/2021 05:52 PM    Bilirubin Negative 08/11/2021 05:52 PM    Blood Negative 08/11/2021 05:52 PM    Urobilinogen 1.0 08/11/2021 05:52 PM    Nitrites Negative 08/11/2021 05:52 PM    Leukocyte Esterase Negative 08/11/2021 05:52 PM    WBC 0-5 09/16/2020 11:12 AM    RBC 0-2 09/16/2020 11:12 AM    Epithelial cells None seen 09/16/2020 11:12 AM    Bacteria None seen 09/16/2020 11:12 AM    Mucus Present 09/08/2020 10:18 AM          All Labs from Last 24 Hrs:  Recent Results (from the past 24 hour(s))   SARS-COV-2    Collection Time: 09/28/21  9:44 AM   Result Value Ref Range    SARS-CoV-2 Please find results under separate order     COVID-19 RAPID TEST    Collection Time: 09/28/21  9:44 AM   Result Value Ref Range    Specimen source NASAL SWAB      COVID-19 rapid test Not detected NOTD         Recent Vital Data:  Patient Vitals for the past 24 hrs:   Temp Pulse Resp BP SpO2   09/28/21 1134 98 °F (36.7 °C) 70 20 122/72 93 %   09/28/21 0700 98.4 °F (36.9 °C) 78 16 (!) 156/80 93 %   09/28/21 0320 98 °F (36.7 °C) 80 18 (!) 164/83 98 %   09/27/21 2315 97.9 °F (36.6 °C) 79 17 (!) 161/83 96 %   09/27/21 2001 97.8 °F (36.6 °C) 66 17 121/73 95 %   09/27/21 1631 97.7 °F (36.5 °C) 99 18 128/64 97 %     Oxygen Therapy  O2 Sat (%): 93 % (09/28/21 1134)  O2 Device: None (Room air) (09/24/21 1901)    Estimated body mass index is 26.85 kg/m² as calculated from the following:    Height as of 8/12/21: 6' (1.829 m). Weight as of 8/12/21: 89.8 kg (198 lb). Intake/Output Summary (Last 24 hours) at 9/28/2021 1543  Last data filed at 9/28/2021 0830  Gross per 24 hour   Intake 240 ml   Output --   Net 240 ml         Physical Exam:  General:    Well nourished. Appears stated age; frail, weak. Head:  Normocephalic, atraumatic  Eyes:  Sclerae appear normal.  Pupils equally round. HENT:  Nares appear normal, no drainage. Moist mucous membranes  Neck:  No restricted ROM. Trachea midline  CV:   RRR. No m/r/g. Lungs:   CTAB. Even, unlabored  Abdomen:   Soft, nontender, nondistended  Extremities: Warm and dry. No cyanosis or clubbing. No edema. Skin:     No rashes. Normal coloration  Neuro:  Cranial nerves II-XII grossly intact. Weak, garbled voice/slurred speech. R hand weakness. Psych:  Normal mood and affect.     Current Med List in Hospital:   Current Facility-Administered Medications   Medication Dose Route Frequency    cetirizine (ZYRTEC) tablet 10 mg  10 mg Oral DAILY    acetaminophen (TYLENOL) tablet 650 mg  650 mg Oral Q6H PRN    Or    acetaminophen (TYLENOL) suppository 650 mg  650 mg Rectal Q6H PRN    ondansetron (ZOFRAN ODT) tablet 4 mg  4 mg Oral Q8H PRN    Or    ondansetron (ZOFRAN) injection 4 mg  4 mg IntraVENous Q6H PRN    polyethylene glycol (MIRALAX) packet 17 g  17 g Oral DAILY PRN    aspirin chewable tablet 81 mg  81 mg Oral DAILY       Allergies   Allergen Reactions    Augmentin [Amoxicillin-Pot Clavulanate] Nausea and Vomiting     Immunization History   Administered Date(s) Administered    COVID-19, PFIZER, MRNA, LNP-S, PF, 30MCG/0.3ML DOSE 01/23/2021, 02/23/2021    Hep A Vaccine (Adult) 11/03/2020    Influenza High Dose Vaccine PF 01/22/2018, 10/09/2019    Influenza Vaccine (Tri) Adjuvanted (>65 Yrs FLUAD TRI [id number]) 10/09/2019    Influenza, Quadrivalent, Adjuvanted (>65 Yrs FLUAD QUAD [id number]) 09/16/2020, 09/23/2021    Pneumococcal Conjugate (PCV-13) 01/22/2018    TB Skin Test (PPD) Intradermal 08/11/2021    Tdap 08/01/2021       Signed:  Gianluca Tolentino MD    Part of this note may have been written by using a voice dictation software. The note has been proof read but may still contain some grammatical/other typographical errors.

## 2021-09-28 NOTE — PROGRESS NOTES
Hospitalist Progress Note   Admit Date:  2021  7:29 PM   Name:  Demetri Christine   Age:  78 y.o. Sex:  male  :  1940   MRN:  [id number]   Room:  [facility]/    Presenting Complaint: No chief complaint on file. Initial Admission Diagnosis: Inability to walk [R26.2]  Hoarseness [R49.0]     Assessment and Plan:   # Progressive weakness              - Seen by Neurology at . [facility] 139 , suspicious for motor neuron disease. MRI T/L spine largely unremarkable; C-spine with some very mild cord narrowing. Neurology recs appreciated. 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 3) carpal tunnel syndrome mild to moderate in degree. 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 No myopathic process. \"     # Probable aspiration              - MBS today, ST following.     # R hand pain              - S/p fall several weeks ago. X-rays  were unremarkable. May be related to EMG findings as above.     Discharge Planning: STR. Medically stable pending MBS. Diet:  ADULT DIET Easy to Chew; Mildly Thick (Nectar); Liquids by Spoon only  DVT PPx: SCDs  Code status: DNR    Hospital Problems as of 2021 Date Reviewed: 2021        Codes Class Noted - Resolved POA    Suspected pulmonary aspiration of food ICD-10-CM: T17.820A  ICD-9-CM: 934.8  2021 - Present Unknown        Hoarseness ICD-10-CM: R49.0  ICD-9-CM: 784.42  2021 - Present Yes        * (Principal) Inability to walk ICD-10-CM: R26.2  ICD-9-CM: 719.7  2021 - Present Yes              Hospital Course:   Mr. Weber Dakins is a nice 79 y/o WM who presented to the ER at [facility] on  with ongoing R sided weakness, hoarse voice and trouble ambulating. He was hospitalized last month and had an unremarkable CVA work up. He was discharged home. Went to a [facility] ER recently and ultimately discharged from there. He saw his PCP on 9/23 due to ongoing weakness, drooling and difficulty with speech. His PCP called and spoke to Dr. Chanel Meyer who advised that he go to the ER for further evaluation. He was seen by Neurology with concerns for motor neuron disease. C-spine MRI showed mild central canal narrowing w/o cord signal changes. Since no beds were available at [facility] he was transferred to [facility] for further management. Thoracolumbar MRIs are pending. 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 No myopathic process. \"    24hr Events/Subjective (09/28/21):   9/28: In bed, no complaints, still some R hand/finger pain. No chest pain or SOB otherwise. Speech still difficult to understand. Wife not present currently. He has a rehab bed. D/w ST and his MBS will be done at 1pm.     Objective:     Patient Vitals for the past 24 hrs:   Temp Pulse Resp BP SpO2   09/28/21 0700 98.4 °F (36.9 °C) 78 16 (!) 156/80 93 %   09/28/21 0320 98 °F (36.7 °C) 80 18 (!) 164/83 98 %   09/27/21 2315 97.9 °F (36.6 °C) 79 17 (!) 161/83 96 %   09/27/21 2001 97.8 °F (36.6 °C) 66 17 121/73 95 %   09/27/21 1631 97.7 °F (36.5 °C) 99 18 128/64 97 %   09/27/21 1031 97.5 °F (36.4 °C) 71 17 (!) 142/78 94 %     Oxygen Therapy  O2 Sat (%): 93 % (09/28/21 0700)  O2 Device: None (Room air) (09/24/21 1901)    Estimated body mass index is 26.85 kg/m² as calculated from the following:    Height as of 8/12/21: 6' (1.829 m). Weight as of 8/12/21: 89.8 kg (198 lb). No intake or output data in the 24 hours ending 09/28/21 0918      General:    Well nourished. No overt distress. Appears stated age. Head:  Normocephalic, atraumatic. Eyes:  Sclerae appear normal.  Pupils equally round. HENT:  Nares appear normal, no drainage.   Moist mucous membranes. Neck:  No restricted ROM. Trachea midline  CV:   RRR. No m/r/g. No JVD  Lungs:   CTAB. No wheezing, rhonchi, or rales. Appears even, unlabored. Abdomen: Bowel sounds present. Soft, nontender, nondistended. Extremities: Warm and dry. No cyanosis or clubbing. No edema. Skin:     No rashes. Normal turgor. Normal coloration  Neuro:  Cranial nerves II-XII grossly intact. Sensation intact. Speech difficult to understand. Psych:  Normal mood and affect. Alert and oriented x3. Data Ordered and Personally Reviewed:    Last 24hr Labs:  No results found for this or any previous visit (from the past 24 hour(s)). All Micro Results     None          Current Meds:  Current Facility-Administered Medications   Medication Dose Route Frequency    cetirizine (ZYRTEC) tablet 10 mg  10 mg Oral DAILY    acetaminophen (TYLENOL) tablet 650 mg  650 mg Oral Q6H PRN    Or    acetaminophen (TYLENOL) suppository 650 mg  650 mg Rectal Q6H PRN    ondansetron (ZOFRAN ODT) tablet 4 mg  4 mg Oral Q8H PRN    Or    ondansetron (ZOFRAN) injection 4 mg  4 mg IntraVENous Q6H PRN    polyethylene glycol (MIRALAX) packet 17 g  17 g Oral DAILY PRN    aspirin chewable tablet 81 mg  81 mg Oral DAILY       Other Studies:    No results found. Signed:  Francois Medina MD    Part of this note may have been written by using a voice dictation software. The note has been proof read but may still contain some grammatical/other typographical errors.